# Patient Record
Sex: FEMALE | Race: BLACK OR AFRICAN AMERICAN | Employment: UNEMPLOYED | URBAN - METROPOLITAN AREA
[De-identification: names, ages, dates, MRNs, and addresses within clinical notes are randomized per-mention and may not be internally consistent; named-entity substitution may affect disease eponyms.]

---

## 2018-04-10 ENCOUNTER — HOSPITAL ENCOUNTER (OUTPATIENT)
Age: 58
Discharge: HOME | End: 2018-04-10
Payer: COMMERCIAL

## 2018-04-10 LAB
ALBUMIN SERPL-MCNC: 4.6 G/DL (ref 3.5–5.2)
ALP BLD-CCNC: 75 U/L (ref 35–104)
ALT SERPL-CCNC: 41 U/L (ref 0–32)
ANION GAP SERPL CALCULATED.3IONS-SCNC: 16 MMOL/L (ref 7–16)
AST SERPL-CCNC: 51 U/L (ref 0–31)
BILIRUB SERPL-MCNC: 0.4 MG/DL (ref 0–1.2)
BILIRUBIN URINE: NEGATIVE
BLOOD, URINE: NEGATIVE
BUN BLDV-MCNC: 8 MG/DL (ref 6–20)
CALCIUM SERPL-MCNC: 10.1 MG/DL (ref 8.6–10.2)
CHLORIDE BLD-SCNC: 98 MMOL/L (ref 98–107)
CHOLESTEROL, FASTING: 193 MG/DL (ref 0–199)
CLARITY: CLEAR
CO2: 27 MMOL/L (ref 22–29)
COLOR: YELLOW
CREAT SERPL-MCNC: 0.7 MG/DL (ref 0.5–1)
GFR AFRICAN AMERICAN: >60
GFR NON-AFRICAN AMERICAN: >60 ML/MIN/1.73
GLUCOSE FASTING: 81 MG/DL (ref 74–109)
GLUCOSE URINE: NEGATIVE MG/DL
HBA1C MFR BLD: 6.7 % (ref 4.8–5.9)
HCT VFR BLD CALC: 41.7 % (ref 34–48)
HDLC SERPL-MCNC: 41 MG/DL
HEMOGLOBIN: 13 G/DL (ref 11.5–15.5)
KETONES, URINE: NEGATIVE MG/DL
LDL CHOLESTEROL CALCULATED: 118 MG/DL (ref 0–99)
LEUKOCYTE ESTERASE, URINE: NEGATIVE
MCH RBC QN AUTO: 23.3 PG (ref 26–35)
MCHC RBC AUTO-ENTMCNC: 31.2 % (ref 32–34.5)
MCV RBC AUTO: 74.7 FL (ref 80–99.9)
NITRITE, URINE: NEGATIVE
PDW BLD-RTO: 14.9 FL (ref 11.5–15)
PH UA: 6 (ref 5–9)
PLATELET # BLD: 346 E9/L (ref 130–450)
PMV BLD AUTO: 9.7 FL (ref 7–12)
POTASSIUM SERPL-SCNC: 4.9 MMOL/L (ref 3.5–5)
PROTEIN UA: NEGATIVE MG/DL
RBC # BLD: 5.58 E12/L (ref 3.5–5.5)
SODIUM BLD-SCNC: 141 MMOL/L (ref 132–146)
SPECIFIC GRAVITY UA: 1.02 (ref 1–1.03)
TOTAL PROTEIN: 7.9 G/DL (ref 6.4–8.3)
TRIGLYCERIDE, FASTING: 171 MG/DL (ref 0–149)
UROBILINOGEN, URINE: 0.2 E.U./DL
VLDLC SERPL CALC-MCNC: 34 MG/DL
WBC # BLD: 6.1 E9/L (ref 4.5–11.5)

## 2018-04-10 PROCEDURE — 80053 COMPREHEN METABOLIC PANEL: CPT

## 2018-04-10 PROCEDURE — 85027 COMPLETE CBC AUTOMATED: CPT

## 2018-04-10 PROCEDURE — 80061 LIPID PANEL: CPT

## 2018-04-10 PROCEDURE — 83036 HEMOGLOBIN GLYCOSYLATED A1C: CPT

## 2018-04-10 PROCEDURE — 36415 COLL VENOUS BLD VENIPUNCTURE: CPT

## 2018-04-10 PROCEDURE — 81003 URINALYSIS AUTO W/O SCOPE: CPT

## 2018-10-25 ENCOUNTER — HOSPITAL ENCOUNTER (OUTPATIENT)
Dept: GENERAL RADIOLOGY | Age: 58
Discharge: HOME | End: 2018-10-27
Payer: COMMERCIAL

## 2018-10-25 DIAGNOSIS — Z12.31 VISIT FOR SCREENING MAMMOGRAM: ICD-10-CM

## 2018-10-25 PROCEDURE — 77067 SCR MAMMO BI INCL CAD: CPT

## 2018-11-02 ENCOUNTER — TELEPHONE (OUTPATIENT)
Dept: BARIATRICS/WEIGHT MGMT | Age: 58
End: 2018-11-02

## 2018-11-02 NOTE — TELEPHONE ENCOUNTER
Patient called in asking about a referral from Dr Sree Orellana for bariatric surgery, I checked her BMI and it is not at the level that is required for surgical intervention and she does not have any commodities that support it. Patient became upset and I did my best try to explain the insurance requirements.

## 2018-12-13 ENCOUNTER — HOSPITAL ENCOUNTER (EMERGENCY)
Age: 58
Discharge: HOME | End: 2018-12-14
Attending: EMERGENCY MEDICINE
Payer: COMMERCIAL

## 2018-12-13 ENCOUNTER — APPOINTMENT (OUTPATIENT)
Dept: CT IMAGING | Age: 58
End: 2018-12-13
Payer: COMMERCIAL

## 2018-12-13 VITALS
BODY MASS INDEX: 37.76 KG/M2 | SYSTOLIC BLOOD PRESSURE: 124 MMHG | DIASTOLIC BLOOD PRESSURE: 90 MMHG | WEIGHT: 200 LBS | RESPIRATION RATE: 14 BRPM | TEMPERATURE: 97.6 F | HEART RATE: 99 BPM | HEIGHT: 61 IN | OXYGEN SATURATION: 98 %

## 2018-12-13 DIAGNOSIS — R10.30 LOWER ABDOMINAL PAIN: Primary | ICD-10-CM

## 2018-12-13 LAB
ANION GAP SERPL CALCULATED.3IONS-SCNC: 11 MMOL/L (ref 7–16)
BACTERIA: ABNORMAL /HPF
BASOPHILS ABSOLUTE: 0.01 E9/L (ref 0–0.2)
BASOPHILS RELATIVE PERCENT: 0.1 % (ref 0–2)
BILIRUBIN URINE: NEGATIVE
BLOOD, URINE: NEGATIVE
BUN BLDV-MCNC: 11 MG/DL (ref 6–20)
CALCIUM SERPL-MCNC: 9.8 MG/DL (ref 8.6–10.2)
CASTS: ABNORMAL /LPF
CHLORIDE BLD-SCNC: 102 MMOL/L (ref 98–107)
CHP ED QC CHECK: YES
CLARITY: CLEAR
CO2: 25 MMOL/L (ref 22–29)
COLOR: YELLOW
CREAT SERPL-MCNC: 0.9 MG/DL (ref 0.5–1)
EOSINOPHILS ABSOLUTE: 0.11 E9/L (ref 0.05–0.5)
EOSINOPHILS RELATIVE PERCENT: 1.3 % (ref 0–6)
EPITHELIAL CELLS, UA: ABNORMAL /HPF
GFR AFRICAN AMERICAN: >60
GFR NON-AFRICAN AMERICAN: >60 ML/MIN/1.73
GLUCOSE BLD-MCNC: 119 MG/DL
GLUCOSE BLD-MCNC: 122 MG/DL (ref 74–99)
GLUCOSE URINE: NEGATIVE MG/DL
HCT VFR BLD CALC: 41.7 % (ref 34–48)
HEMOGLOBIN: 12.7 G/DL (ref 11.5–15.5)
IMMATURE GRANULOCYTES #: 0.02 E9/L
IMMATURE GRANULOCYTES %: 0.2 % (ref 0–5)
KETONES, URINE: ABNORMAL MG/DL
LEUKOCYTE ESTERASE, URINE: NEGATIVE
LIPASE: 31 U/L (ref 13–60)
LYMPHOCYTES ABSOLUTE: 0.95 E9/L (ref 1.5–4)
LYMPHOCYTES RELATIVE PERCENT: 11.4 % (ref 20–42)
MCH RBC QN AUTO: 23 PG (ref 26–35)
MCHC RBC AUTO-ENTMCNC: 30.5 % (ref 32–34.5)
MCV RBC AUTO: 75.5 FL (ref 80–99.9)
METER GLUCOSE: 119 MG/DL (ref 74–99)
MONOCYTES ABSOLUTE: 0.44 E9/L (ref 0.1–0.95)
MONOCYTES RELATIVE PERCENT: 5.3 % (ref 2–12)
NEUTROPHILS ABSOLUTE: 6.81 E9/L (ref 1.8–7.3)
NEUTROPHILS RELATIVE PERCENT: 81.7 % (ref 43–80)
NITRITE, URINE: NEGATIVE
PDW BLD-RTO: 14.9 FL (ref 11.5–15)
PH UA: 5.5 (ref 5–9)
PLATELET # BLD: 364 E9/L (ref 130–450)
PMV BLD AUTO: 9.4 FL (ref 7–12)
POTASSIUM REFLEX MAGNESIUM: 4.4 MMOL/L (ref 3.5–5)
PROTEIN UA: ABNORMAL MG/DL
RBC # BLD: 5.52 E12/L (ref 3.5–5.5)
RBC UA: ABNORMAL /HPF (ref 0–2)
SODIUM BLD-SCNC: 138 MMOL/L (ref 132–146)
SPECIFIC GRAVITY UA: >=1.03 (ref 1–1.03)
UROBILINOGEN, URINE: 0.2 E.U./DL
WBC # BLD: 8.3 E9/L (ref 4.5–11.5)
WBC UA: ABNORMAL /HPF (ref 0–5)

## 2018-12-13 PROCEDURE — 36415 COLL VENOUS BLD VENIPUNCTURE: CPT

## 2018-12-13 PROCEDURE — 81001 URINALYSIS AUTO W/SCOPE: CPT

## 2018-12-13 PROCEDURE — 99284 EMERGENCY DEPT VISIT MOD MDM: CPT

## 2018-12-13 PROCEDURE — 6360000004 HC RX CONTRAST MEDICATION: Performed by: RADIOLOGY

## 2018-12-13 PROCEDURE — 82962 GLUCOSE BLOOD TEST: CPT

## 2018-12-13 PROCEDURE — 85025 COMPLETE CBC W/AUTO DIFF WBC: CPT

## 2018-12-13 PROCEDURE — 74177 CT ABD & PELVIS W/CONTRAST: CPT

## 2018-12-13 PROCEDURE — 80048 BASIC METABOLIC PNL TOTAL CA: CPT

## 2018-12-13 PROCEDURE — 83690 ASSAY OF LIPASE: CPT

## 2018-12-13 RX ADMIN — IOPAMIDOL 110 ML: 755 INJECTION, SOLUTION INTRAVENOUS at 23:40

## 2018-12-13 ASSESSMENT — PAIN DESCRIPTION - PAIN TYPE: TYPE: ACUTE PAIN

## 2018-12-13 ASSESSMENT — PAIN DESCRIPTION - ORIENTATION: ORIENTATION: RIGHT;LEFT

## 2018-12-13 ASSESSMENT — PAIN SCALES - GENERAL: PAINLEVEL_OUTOF10: 6

## 2018-12-13 ASSESSMENT — PAIN DESCRIPTION - LOCATION: LOCATION: ABDOMEN

## 2018-12-13 ASSESSMENT — PAIN DESCRIPTION - DESCRIPTORS: DESCRIPTORS: STABBING

## 2018-12-14 RX ORDER — IBUPROFEN 600 MG/1
600 TABLET ORAL EVERY 6 HOURS PRN
Qty: 120 TABLET | Refills: 0 | Status: SHIPPED | OUTPATIENT
Start: 2018-12-14 | End: 2019-09-19

## 2018-12-14 NOTE — ED NOTES
FIRST PROVIDER CONTACT ASSESSMENT NOTE      Department of Emergency Medicine   12/13/18  8:05 PM    Chief Complaint: Abdominal Pain (for 2-3 days, diarrhea alternating with constipation, dizziness earlier with syncope, states felt over heated, recently dx with diabetes)      History of Present Illness:    Opal Perez is a 66 y.o. female who presents to the ED by private car for weakness with abd cramping gas, loose stools, was put on metformin 500 bid 2 weeks ago with a taper up for new onset NIDDM. NO fever chills N/V/ or Hx of gastric conditions. Has felt a little lightheaded off and on as well      Focused Screening Exam:  Constitutional:  Alert, appears stated age and is in no distress. *ALLERGIES*     Patient has no known allergies.      Vitals:    12/13/18 2006   BP: (!) 124/90   Pulse: 99   Resp: 14   Temp: 97.6 °F (36.4 °C)   SpO2: 98%   Weight: 200 lb (90.7 kg)   Height: 5' 1\" (1.549 m)         Initial Plan of Care:  Initiate Treatment-Testing, Proceed toTreatment Area When Bed Available for ED Attending/MLP to Continue Care    -----------------END OF FIRST PROVIDER CONTACT ASSESSMENT NOTE--------------  Electronically signed by MAXIMUS Ventura CNP   DD: 12/13/18       MAXIMUS Ventura CNP  12/13/18 2010

## 2018-12-14 NOTE — ED PROVIDER NOTES
Department of Emergency Medicine   ED  Provider Note  Admit Date/RoomTime: 12/13/2018  9:30 PM  ED Room: [facility]      History of Present Illness:  12/13/18, Time: 10:12 PM         Bianca Terrell is a 66 y.o. female presenting to the ED for abdominal pain, beginning one week ago. The complaint has been constant, moderate in severity, and worsened by nothing. Pt states that she has lower abdominal pain. Pt reports that she was just diagnosed with DM and that she thinks it could be the Metformin bothering her stomach. Pt denies cough, congestion, fever, chills, N/V/D, HA, CP, SOB, back pain, neck pain, LOC, syncope, urinary symptoms, constipation, or any other symptoms. Review of Systems:   Pertinent positives and negatives are stated within HPI, all other systems reviewed and are negative.    --------------------------------------------- PAST HISTORY ---------------------------------------------  Past Medical History:  has a past medical history of Hyperlipidemia and Hypertension. Past Surgical History:  has a past surgical history that includes Upper gastrointestinal endoscopy (01/09/2018) and Colonoscopy (01/09/2018). Social History:  reports that she has never smoked. She has never used smokeless tobacco. She reports that she does not drink alcohol or use drugs. Family History: family history includes Heart Attack in her maternal grandmother and paternal grandmother; Prostate Cancer in her paternal grandfather; Stroke in her maternal grandmother and paternal grandmother. The patients home medications have been reviewed. Allergies: Patient has no known allergies. ---------------------------------------------------PHYSICAL EXAM--------------------------------------    Constitutional/General: Alert and oriented x3, well appearing, non toxic in NAD  Head: Normocephalic and atraumatic  Eyes: PERRL, EOMI. Mouth: Oropharynx clear, mucous membranes moist.   Neck: Supple. Full ROM. Respiratory: Lungs clear to auscultation bilaterally, no wheezes, rales, or rhonchi. Not in respiratory distress   Cardiovascular:  Regular rate. Regular rhythm. No murmurs, gallops, or rubs. 2+ distal pulses  GI:  Abdomen Soft, Diffuse lower abdominal pain, slight guarding, Non distended. No rebound or rigidity. Musculoskeletal: Moves all extremities x 4. Warm and well perfused. Integument: skin warm and dry. No rashes. Neurologic: GCS 15, 5/5 strength.   -------------------------------------------------- RESULTS -------------------------------------------------  I have personally reviewed all laboratory and imaging results for this patient. Results are listed below.      LABS:  Results for orders placed or performed during the hospital encounter of 12/13/18   CBC Auto Differential   Result Value Ref Range    WBC 8.3 4.5 - 11.5 E9/L    RBC 5.52 (H) 3.50 - 5.50 E12/L    Hemoglobin 12.7 11.5 - 15.5 g/dL    Hematocrit 41.7 34.0 - 48.0 %    MCV 75.5 (L) 80.0 - 99.9 fL    MCH 23.0 (L) 26.0 - 35.0 pg    MCHC 30.5 (L) 32.0 - 34.5 %    RDW 14.9 11.5 - 15.0 fL    Platelets 136 589 - 729 E9/L    MPV 9.4 7.0 - 12.0 fL    Neutrophils % 81.7 (H) 43.0 - 80.0 %    Immature Granulocytes % 0.2 0.0 - 5.0 %    Lymphocytes % 11.4 (L) 20.0 - 42.0 %    Monocytes % 5.3 2.0 - 12.0 %    Eosinophils % 1.3 0.0 - 6.0 %    Basophils % 0.1 0.0 - 2.0 %    Neutrophils # 6.81 1.80 - 7.30 E9/L    Immature Granulocytes # 0.02 E9/L    Lymphocytes # 0.95 (L) 1.50 - 4.00 E9/L    Monocytes # 0.44 0.10 - 0.95 E9/L    Eosinophils # 0.11 0.05 - 0.50 E9/L    Basophils # 0.01 0.00 - 0.20 Y8/C   Basic Metabolic Panel w/ Reflex to MG   Result Value Ref Range    Sodium 138 132 - 146 mmol/L    Potassium reflex Magnesium 4.4 3.5 - 5.0 mmol/L    Chloride 102 98 - 107 mmol/L    CO2 25 22 - 29 mmol/L    Anion Gap 11 7 - 16 mmol/L    Glucose 122 (H) 74 - 99 mg/dL    BUN 11 6 - 20 mg/dL    CREATININE 0.9 0.5 - 1.0 mg/dL    GFR Non-African American >60 >=60

## 2019-08-05 ENCOUNTER — TELEPHONE (OUTPATIENT)
Dept: ADMINISTRATIVE | Age: 59
End: 2019-08-05

## 2019-09-19 ENCOUNTER — APPOINTMENT (OUTPATIENT)
Dept: CT IMAGING | Age: 59
End: 2019-09-19
Payer: COMMERCIAL

## 2019-09-19 ENCOUNTER — HOSPITAL ENCOUNTER (OUTPATIENT)
Age: 59
Setting detail: OBSERVATION
Discharge: LEFT BEFORE DISCHARGE | End: 2019-09-20
Attending: EMERGENCY MEDICINE | Admitting: HOSPITALIST
Payer: COMMERCIAL

## 2019-09-19 ENCOUNTER — APPOINTMENT (OUTPATIENT)
Dept: GENERAL RADIOLOGY | Age: 59
End: 2019-09-19
Payer: COMMERCIAL

## 2019-09-19 ENCOUNTER — OFFICE VISIT (OUTPATIENT)
Dept: CARDIOLOGY CLINIC | Age: 59
End: 2019-09-19
Payer: COMMERCIAL

## 2019-09-19 VITALS
OXYGEN SATURATION: 96 % | RESPIRATION RATE: 18 BRPM | WEIGHT: 224.4 LBS | HEIGHT: 61 IN | BODY MASS INDEX: 42.37 KG/M2 | SYSTOLIC BLOOD PRESSURE: 90 MMHG | DIASTOLIC BLOOD PRESSURE: 50 MMHG | HEART RATE: 95 BPM

## 2019-09-19 DIAGNOSIS — R53.83 FATIGUE, UNSPECIFIED TYPE: ICD-10-CM

## 2019-09-19 DIAGNOSIS — E78.5 HYPERLIPIDEMIA, UNSPECIFIED HYPERLIPIDEMIA TYPE: ICD-10-CM

## 2019-09-19 DIAGNOSIS — E66.01 CLASS 3 SEVERE OBESITY DUE TO EXCESS CALORIES WITH BODY MASS INDEX (BMI) OF 40.0 TO 44.9 IN ADULT, UNSPECIFIED WHETHER SERIOUS COMORBIDITY PRESENT (HCC): ICD-10-CM

## 2019-09-19 DIAGNOSIS — R07.9 CHEST PAIN, UNSPECIFIED TYPE: Primary | ICD-10-CM

## 2019-09-19 DIAGNOSIS — E11.9 TYPE 2 DIABETES MELLITUS WITHOUT COMPLICATION, WITHOUT LONG-TERM CURRENT USE OF INSULIN (HCC): ICD-10-CM

## 2019-09-19 DIAGNOSIS — I10 ESSENTIAL HYPERTENSION: ICD-10-CM

## 2019-09-19 LAB
ALBUMIN SERPL-MCNC: 4.5 G/DL (ref 3.5–5.2)
ALP BLD-CCNC: 78 U/L (ref 35–104)
ALT SERPL-CCNC: 37 U/L (ref 0–32)
ANION GAP SERPL CALCULATED.3IONS-SCNC: 15 MMOL/L (ref 7–16)
AST SERPL-CCNC: 44 U/L (ref 0–31)
BACTERIA: ABNORMAL /HPF
BILIRUB SERPL-MCNC: 0.3 MG/DL (ref 0–1.2)
BILIRUBIN URINE: ABNORMAL
BLOOD, URINE: NEGATIVE
BUN BLDV-MCNC: 18 MG/DL (ref 6–20)
C-REACTIVE PROTEIN: 0.5 MG/DL (ref 0–0.4)
CALCIUM SERPL-MCNC: 10.1 MG/DL (ref 8.6–10.2)
CHLORIDE BLD-SCNC: 100 MMOL/L (ref 98–107)
CLARITY: CLEAR
CO2: 26 MMOL/L (ref 22–29)
COLOR: YELLOW
CREAT SERPL-MCNC: 0.9 MG/DL (ref 0.5–1)
EPITHELIAL CELLS, UA: ABNORMAL /HPF
GFR AFRICAN AMERICAN: >60
GFR NON-AFRICAN AMERICAN: >60 ML/MIN/1.73
GLUCOSE BLD-MCNC: 98 MG/DL (ref 74–99)
GLUCOSE URINE: NEGATIVE MG/DL
HBA1C MFR BLD: 6.2 % (ref 4–5.6)
HCT VFR BLD CALC: 43.2 % (ref 34–48)
HEMOGLOBIN: 13.7 G/DL (ref 11.5–15.5)
KETONES, URINE: 15 MG/DL
LEUKOCYTE ESTERASE, URINE: ABNORMAL
MCH RBC QN AUTO: 24 PG (ref 26–35)
MCHC RBC AUTO-ENTMCNC: 31.7 % (ref 32–34.5)
MCV RBC AUTO: 75.5 FL (ref 80–99.9)
METER GLUCOSE: 96 MG/DL (ref 74–99)
MUCUS: PRESENT
NITRITE, URINE: NEGATIVE
PDW BLD-RTO: 14.8 FL (ref 11.5–15)
PH UA: 5.5 (ref 5–9)
PLATELET # BLD: 424 E9/L (ref 130–450)
PMV BLD AUTO: 9.5 FL (ref 7–12)
POTASSIUM SERPL-SCNC: 4.3 MMOL/L (ref 3.5–5)
PRO-BNP: 12 PG/ML (ref 0–125)
PROTEIN UA: ABNORMAL MG/DL
RBC # BLD: 5.72 E12/L (ref 3.5–5.5)
RBC UA: ABNORMAL /HPF (ref 0–2)
SEDIMENTATION RATE, ERYTHROCYTE: 34 MM/HR (ref 0–20)
SODIUM BLD-SCNC: 141 MMOL/L (ref 132–146)
SPECIFIC GRAVITY UA: >=1.03 (ref 1–1.03)
TOTAL PROTEIN: 8.8 G/DL (ref 6.4–8.3)
TROPONIN: <0.01 NG/ML (ref 0–0.03)
TROPONIN: <0.01 NG/ML (ref 0–0.03)
TSH SERPL DL<=0.05 MIU/L-ACNC: 1.46 UIU/ML (ref 0.27–4.2)
UROBILINOGEN, URINE: 0.2 E.U./DL
WBC # BLD: 6.8 E9/L (ref 4.5–11.5)
WBC UA: ABNORMAL /HPF (ref 0–5)

## 2019-09-19 PROCEDURE — G0378 HOSPITAL OBSERVATION PER HR: HCPCS

## 2019-09-19 PROCEDURE — 86140 C-REACTIVE PROTEIN: CPT

## 2019-09-19 PROCEDURE — 6370000000 HC RX 637 (ALT 250 FOR IP): Performed by: EMERGENCY MEDICINE

## 2019-09-19 PROCEDURE — 85651 RBC SED RATE NONAUTOMATED: CPT

## 2019-09-19 PROCEDURE — 84484 ASSAY OF TROPONIN QUANT: CPT

## 2019-09-19 PROCEDURE — 80053 COMPREHEN METABOLIC PANEL: CPT

## 2019-09-19 PROCEDURE — 99285 EMERGENCY DEPT VISIT HI MDM: CPT

## 2019-09-19 PROCEDURE — 86703 HIV-1/HIV-2 1 RESULT ANTBDY: CPT

## 2019-09-19 PROCEDURE — 83036 HEMOGLOBIN GLYCOSYLATED A1C: CPT

## 2019-09-19 PROCEDURE — 99204 OFFICE O/P NEW MOD 45 MIN: CPT | Performed by: INTERNAL MEDICINE

## 2019-09-19 PROCEDURE — 87633 RESP VIRUS 12-25 TARGETS: CPT

## 2019-09-19 PROCEDURE — 71250 CT THORAX DX C-: CPT

## 2019-09-19 PROCEDURE — 94761 N-INVAS EAR/PLS OXIMETRY MLT: CPT

## 2019-09-19 PROCEDURE — 36415 COLL VENOUS BLD VENIPUNCTURE: CPT

## 2019-09-19 PROCEDURE — 84443 ASSAY THYROID STIM HORMONE: CPT

## 2019-09-19 PROCEDURE — 82962 GLUCOSE BLOOD TEST: CPT

## 2019-09-19 PROCEDURE — 87486 CHLMYD PNEUM DNA AMP PROBE: CPT

## 2019-09-19 PROCEDURE — 93000 ELECTROCARDIOGRAM COMPLETE: CPT | Performed by: INTERNAL MEDICINE

## 2019-09-19 PROCEDURE — 85027 COMPLETE CBC AUTOMATED: CPT

## 2019-09-19 PROCEDURE — 87581 M.PNEUMON DNA AMP PROBE: CPT

## 2019-09-19 PROCEDURE — 93005 ELECTROCARDIOGRAM TRACING: CPT | Performed by: NURSE PRACTITIONER

## 2019-09-19 PROCEDURE — 83880 ASSAY OF NATRIURETIC PEPTIDE: CPT

## 2019-09-19 PROCEDURE — 2580000003 HC RX 258: Performed by: EMERGENCY MEDICINE

## 2019-09-19 PROCEDURE — 81001 URINALYSIS AUTO W/SCOPE: CPT

## 2019-09-19 PROCEDURE — 80074 ACUTE HEPATITIS PANEL: CPT

## 2019-09-19 PROCEDURE — 87798 DETECT AGENT NOS DNA AMP: CPT

## 2019-09-19 PROCEDURE — 2580000003 HC RX 258: Performed by: HOSPITALIST

## 2019-09-19 PROCEDURE — 71045 X-RAY EXAM CHEST 1 VIEW: CPT

## 2019-09-19 RX ORDER — 0.9 % SODIUM CHLORIDE 0.9 %
1000 INTRAVENOUS SOLUTION INTRAVENOUS ONCE
Status: COMPLETED | OUTPATIENT
Start: 2019-09-19 | End: 2019-09-19

## 2019-09-19 RX ORDER — SODIUM CHLORIDE 0.9 % (FLUSH) 0.9 %
10 SYRINGE (ML) INJECTION EVERY 12 HOURS SCHEDULED
Status: DISCONTINUED | OUTPATIENT
Start: 2019-09-19 | End: 2019-09-20 | Stop reason: HOSPADM

## 2019-09-19 RX ORDER — GLIPIZIDE 5 MG/1
5 TABLET ORAL DAILY
COMMUNITY
End: 2019-09-19

## 2019-09-19 RX ORDER — SODIUM CHLORIDE 0.9 % (FLUSH) 0.9 %
10 SYRINGE (ML) INJECTION PRN
Status: DISCONTINUED | OUTPATIENT
Start: 2019-09-19 | End: 2019-09-20 | Stop reason: HOSPADM

## 2019-09-19 RX ORDER — ASPIRIN 81 MG/1
324 TABLET, CHEWABLE ORAL ONCE
Status: COMPLETED | OUTPATIENT
Start: 2019-09-19 | End: 2019-09-19

## 2019-09-19 RX ORDER — ONDANSETRON 2 MG/ML
4 INJECTION INTRAMUSCULAR; INTRAVENOUS EVERY 6 HOURS PRN
Status: DISCONTINUED | OUTPATIENT
Start: 2019-09-19 | End: 2019-09-20 | Stop reason: HOSPADM

## 2019-09-19 RX ORDER — PRAMIPEXOLE DIHYDROCHLORIDE 0.12 MG/1
0.12 TABLET ORAL NIGHTLY PRN
COMMUNITY
End: 2020-08-23 | Stop reason: ALTCHOICE

## 2019-09-19 RX ORDER — PANTOPRAZOLE SODIUM 40 MG/1
40 TABLET, DELAYED RELEASE ORAL
Status: DISCONTINUED | OUTPATIENT
Start: 2019-09-19 | End: 2019-09-20 | Stop reason: HOSPADM

## 2019-09-19 RX ADMIN — SODIUM CHLORIDE 1000 ML: 9 INJECTION, SOLUTION INTRAVENOUS at 16:40

## 2019-09-19 RX ADMIN — Medication 10 ML: at 21:08

## 2019-09-19 RX ADMIN — ASPIRIN 81 MG 324 MG: 81 TABLET ORAL at 15:30

## 2019-09-19 ASSESSMENT — ENCOUNTER SYMPTOMS
DIARRHEA: 0
BLOOD IN STOOL: 0
WHEEZING: 0
COUGH: 0
CONSTIPATION: 1
BACK PAIN: 0
ABDOMINAL PAIN: 1
NAUSEA: 0
VOMITING: 0
SHORTNESS OF BREATH: 0

## 2019-09-19 ASSESSMENT — PAIN SCALES - GENERAL
PAINLEVEL_OUTOF10: 0
PAINLEVEL_OUTOF10: 3

## 2019-09-19 ASSESSMENT — PAIN DESCRIPTION - DIRECTION: RADIATING_TOWARDS: LEFT SHOULDER

## 2019-09-19 ASSESSMENT — PAIN DESCRIPTION - DESCRIPTORS: DESCRIPTORS: HEAVINESS

## 2019-09-19 ASSESSMENT — PAIN DESCRIPTION - PAIN TYPE: TYPE: ACUTE PAIN

## 2019-09-19 ASSESSMENT — PAIN DESCRIPTION - LOCATION: LOCATION: CHEST

## 2019-09-19 ASSESSMENT — PAIN DESCRIPTION - ORIENTATION: ORIENTATION: MID

## 2019-09-19 NOTE — PROGRESS NOTES
Emotionally abused: Not on file     Physically abused: Not on file     Forced sexual activity: Not on file   Other Topics Concern    Not on file   Social History Narrative    Cut out pop since bariatric sx on 9/10/19; no coffee/tea       Allergies:  No Known Allergies    Current Medications:  Current Outpatient Medications   Medication Sig Dispense Refill    glipiZIDE (GLUCOTROL) 5 MG tablet Take 5 mg by mouth daily      ibuprofen (IBU) 600 MG tablet Take 1 tablet by mouth every 6 hours as needed for Pain (Patient not taking: Reported on 9/19/2019) 120 tablet 0    omeprazole (PRILOSEC) 20 MG delayed release capsule Take 1 capsule by mouth 2 times daily (Patient not taking: Reported on 9/19/2019) 60 capsule 3    ALPRAZolam (XANAX) 0.25 MG tablet TK 1 T PO PRN  1    acetaminophen (TYLENOL) 325 MG tablet Take 650 mg by mouth every 6 hours as needed for Pain      pravastatin (PRAVACHOL) 10 MG tablet Take 20 mg by mouth nightly       amLODIPine (NORVASC) 5 MG tablet Take 5 mg by mouth daily       No current facility-administered medications for this visit. Physical Exam:  BP (!) 90/50   Pulse 95   Resp 18   Ht 5' 1\" (1.549 m)   Wt 224 lb 6.4 oz (101.8 kg)   LMP 01/10/2013   SpO2 96%   BMI 42.40 kg/m²   Wt Readings from Last 3 Encounters:   09/19/19 224 lb 6.4 oz (101.8 kg)   12/13/18 200 lb (90.7 kg)   12/14/17 200 lb (90.7 kg)     Physical Exam:  BP (!) 90/50   Pulse 95   Resp 18   Ht 5' 1\" (1.549 m)   Wt 224 lb 6.4 oz (101.8 kg)   LMP 01/10/2013   SpO2 96%   BMI 42.40 kg/m²   Wt Readings from Last 3 Encounters:   09/19/19 224 lb 6.4 oz (101.8 kg)   12/13/18 200 lb (90.7 kg)   12/14/17 200 lb (90.7 kg)     Physical Exam   Constitutional: She is oriented to person, place, and time. She appears well-developed. No distress. HENT:   Head: Normocephalic and atraumatic. Neck: Neck supple. No JVD present.    Cardiovascular: Normal rate, regular rhythm, normal heart sounds and intact distal LABVLDL 24 04/27/2017       Cardiac Tests:    ECG: Sinus rhythm at 95 bpm, left atrial enlargement and low voltage in the frontal leads. Edith Night done on 9/30/2017:  Normal examination without evidence of treadmill stress induced left   ventricular myocardial ischemia. EF 55%.           ASSESSMENT / PLANt:    Chest pain: of unclear etiology. It could be due to underlying CAD since she has multiple risk factors. She had a nuclear tress test at [facility] on 3/7/2019 that was reported as normal.  However, her pain could be due to GERD or musculoskeletal in origin. .     Type 2 diabetes mellitus without complication, without long-term current use of insulin ([facility] [address]    Essential hypertension: Patient has hypotension at the office on no meds. She is probably dehydrated since not eating well after her gastric bypass surgery. Hyperlipidemia, unspecified hyperlipidemia type    Fatigue after her gastric bypass surgery: The fatigue could be due to hypotension. Class 3 severe obesity due to excess calories with body mass index (BMI) of 40.0 to 44.9 in adult, unspecified whether serious comorbidity present [facility])    Will refer to the emergency department for hydration due to low blood pressure and for blood tests to rule out dehydration, bleeding, or PE or myocardial infarction. Thank you for allowing me to participate in your patient's care. Please feel free to contact me if you have any questions or concerns.     Mihai Gandara MD, [facility], [address] [place] [address] Cardiology

## 2019-09-20 ENCOUNTER — APPOINTMENT (OUTPATIENT)
Dept: NON INVASIVE DIAGNOSTICS | Age: 59
End: 2019-09-20
Payer: COMMERCIAL

## 2019-09-20 VITALS
SYSTOLIC BLOOD PRESSURE: 125 MMHG | OXYGEN SATURATION: 95 % | BODY MASS INDEX: 42.48 KG/M2 | TEMPERATURE: 98.1 F | DIASTOLIC BLOOD PRESSURE: 73 MMHG | RESPIRATION RATE: 16 BRPM | HEIGHT: 61 IN | WEIGHT: 225 LBS | HEART RATE: 86 BPM

## 2019-09-20 LAB
ANION GAP SERPL CALCULATED.3IONS-SCNC: 18 MMOL/L (ref 7–16)
BUN BLDV-MCNC: 14 MG/DL (ref 6–20)
CALCIUM SERPL-MCNC: 8.8 MG/DL (ref 8.6–10.2)
CHLORIDE BLD-SCNC: 104 MMOL/L (ref 98–107)
CO2: 19 MMOL/L (ref 22–29)
CREAT SERPL-MCNC: 0.7 MG/DL (ref 0.5–1)
EKG ATRIAL RATE: 76 BPM
EKG P AXIS: 68 DEGREES
EKG P-R INTERVAL: 144 MS
EKG Q-T INTERVAL: 386 MS
EKG QRS DURATION: 64 MS
EKG QTC CALCULATION (BAZETT): 434 MS
EKG R AXIS: 35 DEGREES
EKG T AXIS: 36 DEGREES
EKG VENTRICULAR RATE: 76 BPM
FILM ARRAY ADENOVIRUS: NORMAL
FILM ARRAY BORDETELLA PERTUSSIS: NORMAL
FILM ARRAY CHLAMYDOPHILIA PNEUMONIAE: NORMAL
FILM ARRAY CORONAVIRUS 229E: NORMAL
FILM ARRAY CORONAVIRUS HKU1: NORMAL
FILM ARRAY CORONAVIRUS NL63: NORMAL
FILM ARRAY CORONAVIRUS OC43: NORMAL
FILM ARRAY INFLUENZA A VIRUS 09H1: NORMAL
FILM ARRAY INFLUENZA A VIRUS H1: NORMAL
FILM ARRAY INFLUENZA A VIRUS H3: NORMAL
FILM ARRAY INFLUENZA A VIRUS: NORMAL
FILM ARRAY INFLUENZA B: NORMAL
FILM ARRAY METAPNEUMOVIRUS: NORMAL
FILM ARRAY MYCOPLASMA PNEUMONIAE: NORMAL
FILM ARRAY PARAINFLUENZA VIRUS 1: NORMAL
FILM ARRAY PARAINFLUENZA VIRUS 2: NORMAL
FILM ARRAY PARAINFLUENZA VIRUS 3: NORMAL
FILM ARRAY PARAINFLUENZA VIRUS 4: NORMAL
FILM ARRAY RESPIRATORY SYNCITIAL VIRUS: NORMAL
FILM ARRAY RHINOVIRUS/ENTEROVIRUS: NORMAL
GFR AFRICAN AMERICAN: >60
GFR NON-AFRICAN AMERICAN: >60 ML/MIN/1.73
GLUCOSE BLD-MCNC: 86 MG/DL (ref 74–99)
HAV IGM SER IA-ACNC: NORMAL
HCT VFR BLD CALC: 38.2 % (ref 34–48)
HEMOGLOBIN: 12 G/DL (ref 11.5–15.5)
HEPATITIS B CORE IGM ANTIBODY: NORMAL
HEPATITIS B SURFACE ANTIGEN INTERPRETATION: NORMAL
HEPATITIS C ANTIBODY INTERPRETATION: NORMAL
HIV-1 AND HIV-2 ANTIBODIES: NORMAL
MAGNESIUM: 2 MG/DL (ref 1.6–2.6)
MCH RBC QN AUTO: 23.7 PG (ref 26–35)
MCHC RBC AUTO-ENTMCNC: 31.4 % (ref 32–34.5)
MCV RBC AUTO: 75.3 FL (ref 80–99.9)
METER GLUCOSE: 88 MG/DL (ref 74–99)
PDW BLD-RTO: 14.6 FL (ref 11.5–15)
PHOSPHORUS: 3.2 MG/DL (ref 2.5–4.5)
PLATELET # BLD: 367 E9/L (ref 130–450)
PMV BLD AUTO: 9.7 FL (ref 7–12)
POTASSIUM SERPL-SCNC: 3.8 MMOL/L (ref 3.5–5)
RBC # BLD: 5.07 E12/L (ref 3.5–5.5)
SODIUM BLD-SCNC: 141 MMOL/L (ref 132–146)
TROPONIN: <0.01 NG/ML (ref 0–0.03)
WBC # BLD: 5.3 E9/L (ref 4.5–11.5)

## 2019-09-20 PROCEDURE — 84484 ASSAY OF TROPONIN QUANT: CPT

## 2019-09-20 PROCEDURE — 99215 OFFICE O/P EST HI 40 MIN: CPT | Performed by: INTERNAL MEDICINE

## 2019-09-20 PROCEDURE — 93010 ELECTROCARDIOGRAM REPORT: CPT | Performed by: INTERNAL MEDICINE

## 2019-09-20 PROCEDURE — 80048 BASIC METABOLIC PNL TOTAL CA: CPT

## 2019-09-20 PROCEDURE — 36415 COLL VENOUS BLD VENIPUNCTURE: CPT

## 2019-09-20 PROCEDURE — 84100 ASSAY OF PHOSPHORUS: CPT

## 2019-09-20 PROCEDURE — G0378 HOSPITAL OBSERVATION PER HR: HCPCS

## 2019-09-20 PROCEDURE — 6370000000 HC RX 637 (ALT 250 FOR IP): Performed by: HOSPITALIST

## 2019-09-20 PROCEDURE — 82962 GLUCOSE BLOOD TEST: CPT

## 2019-09-20 PROCEDURE — 83735 ASSAY OF MAGNESIUM: CPT

## 2019-09-20 PROCEDURE — 85027 COMPLETE CBC AUTOMATED: CPT

## 2019-09-20 RX ADMIN — PANTOPRAZOLE SODIUM 40 MG: 40 TABLET, DELAYED RELEASE ORAL at 06:06

## 2019-09-20 ASSESSMENT — PAIN SCALES - GENERAL: PAINLEVEL_OUTOF10: 0

## 2019-09-20 NOTE — PROGRESS NOTES
Blood pressure has improved.  Continue to monitor blood pressure correctly at home and goal is less than 120/80.  Will increase amlodipine to 5 mg if indicated.  Discussed watching dietary sodium as well.   Page sent out to Dr. Galilea Liriano regarding new cardiology consult. Awaiting call back. Dr. Galilea Liriano made phone call to unit. See new orders.   [address]

## 2019-09-20 NOTE — PROGRESS NOTES
Called Echo regarding patient needing test completed for discharge at 1500. RN was informed that time will be adjusted if possible.     Kenya Godinez RN

## 2019-09-20 NOTE — CONSULTS
Patient is known to [address] [place] [address] cardiology, Will deferConsult to their service

## 2019-11-01 ENCOUNTER — OFFICE VISIT (OUTPATIENT)
Dept: CARDIOLOGY CLINIC | Age: 59
End: 2019-11-01
Payer: COMMERCIAL

## 2019-11-01 VITALS
WEIGHT: 210 LBS | RESPIRATION RATE: 16 BRPM | DIASTOLIC BLOOD PRESSURE: 82 MMHG | BODY MASS INDEX: 39.65 KG/M2 | SYSTOLIC BLOOD PRESSURE: 128 MMHG | HEIGHT: 61 IN | HEART RATE: 73 BPM

## 2019-11-01 DIAGNOSIS — R07.9 CHEST PAIN, UNSPECIFIED TYPE: Primary | ICD-10-CM

## 2019-11-01 PROCEDURE — 99213 OFFICE O/P EST LOW 20 MIN: CPT | Performed by: INTERNAL MEDICINE

## 2019-11-01 PROCEDURE — 93000 ELECTROCARDIOGRAM COMPLETE: CPT | Performed by: INTERNAL MEDICINE

## 2019-11-01 ASSESSMENT — ENCOUNTER SYMPTOMS
DIARRHEA: 0
WHEEZING: 0
SHORTNESS OF BREATH: 0
ABDOMINAL PAIN: 0
NAUSEA: 0
BLOOD IN STOOL: 0
COUGH: 0
CONSTIPATION: 1
BACK PAIN: 0
VOMITING: 0

## 2019-12-19 ENCOUNTER — HOSPITAL ENCOUNTER (OUTPATIENT)
Dept: GENERAL RADIOLOGY | Age: 59
Discharge: HOME | End: 2019-12-21
Payer: COMMERCIAL

## 2019-12-19 DIAGNOSIS — Z12.31 VISIT FOR SCREENING MAMMOGRAM: ICD-10-CM

## 2019-12-19 PROCEDURE — 77063 BREAST TOMOSYNTHESIS BI: CPT

## 2020-03-13 ENCOUNTER — HOSPITAL ENCOUNTER (OUTPATIENT)
Age: 60
Discharge: HOME | End: 2020-03-13
Payer: COMMERCIAL

## 2020-03-13 LAB
ALBUMIN SERPL-MCNC: 4.3 G/DL (ref 3.5–5.2)
ALP BLD-CCNC: 92 U/L (ref 35–104)
ALT SERPL-CCNC: 18 U/L (ref 0–32)
ANION GAP SERPL CALCULATED.3IONS-SCNC: 11 MMOL/L (ref 7–16)
AST SERPL-CCNC: 26 U/L (ref 0–31)
BASOPHILS ABSOLUTE: 0.03 E9/L (ref 0–0.2)
BASOPHILS RELATIVE PERCENT: 0.7 % (ref 0–2)
BILIRUB SERPL-MCNC: 0.3 MG/DL (ref 0–1.2)
BUN BLDV-MCNC: 11 MG/DL (ref 6–20)
CALCIUM SERPL-MCNC: 9.5 MG/DL (ref 8.6–10.2)
CHLORIDE BLD-SCNC: 103 MMOL/L (ref 98–107)
CO2: 26 MMOL/L (ref 22–29)
CREAT SERPL-MCNC: 0.8 MG/DL (ref 0.5–1)
EOSINOPHILS ABSOLUTE: 0.12 E9/L (ref 0.05–0.5)
EOSINOPHILS RELATIVE PERCENT: 2.9 % (ref 0–6)
GFR AFRICAN AMERICAN: >60
GFR NON-AFRICAN AMERICAN: >60 ML/MIN/1.73
GLUCOSE BLD-MCNC: 98 MG/DL (ref 74–99)
HCT VFR BLD CALC: 41.4 % (ref 34–48)
HEMOGLOBIN: 12.9 G/DL (ref 11.5–15.5)
IMMATURE GRANULOCYTES #: 0 E9/L
IMMATURE GRANULOCYTES %: 0 % (ref 0–5)
LYMPHOCYTES ABSOLUTE: 2.2 E9/L (ref 1.5–4)
LYMPHOCYTES RELATIVE PERCENT: 52.5 % (ref 20–42)
MCH RBC QN AUTO: 23.8 PG (ref 26–35)
MCHC RBC AUTO-ENTMCNC: 31.2 % (ref 32–34.5)
MCV RBC AUTO: 76.2 FL (ref 80–99.9)
MONOCYTES ABSOLUTE: 0.31 E9/L (ref 0.1–0.95)
MONOCYTES RELATIVE PERCENT: 7.4 % (ref 2–12)
NEUTROPHILS ABSOLUTE: 1.53 E9/L (ref 1.8–7.3)
NEUTROPHILS RELATIVE PERCENT: 36.5 % (ref 43–80)
PDW BLD-RTO: 14.9 FL (ref 11.5–15)
PLATELET # BLD: 321 E9/L (ref 130–450)
PMV BLD AUTO: 9.3 FL (ref 7–12)
POTASSIUM SERPL-SCNC: 4.1 MMOL/L (ref 3.5–5)
RBC # BLD: 5.43 E12/L (ref 3.5–5.5)
SODIUM BLD-SCNC: 140 MMOL/L (ref 132–146)
TOTAL PROTEIN: 7.6 G/DL (ref 6.4–8.3)
WBC # BLD: 4.2 E9/L (ref 4.5–11.5)

## 2020-03-13 PROCEDURE — 36415 COLL VENOUS BLD VENIPUNCTURE: CPT

## 2020-03-13 PROCEDURE — 85025 COMPLETE CBC W/AUTO DIFF WBC: CPT

## 2020-03-13 PROCEDURE — 80053 COMPREHEN METABOLIC PANEL: CPT

## 2020-05-10 ENCOUNTER — APPOINTMENT (OUTPATIENT)
Dept: GENERAL RADIOLOGY | Age: 60
End: 2020-05-10
Payer: COMMERCIAL

## 2020-05-10 ENCOUNTER — HOSPITAL ENCOUNTER (EMERGENCY)
Age: 60
Discharge: HOME | End: 2020-05-10
Attending: EMERGENCY MEDICINE
Payer: COMMERCIAL

## 2020-05-10 ENCOUNTER — APPOINTMENT (OUTPATIENT)
Dept: CT IMAGING | Age: 60
End: 2020-05-10
Payer: COMMERCIAL

## 2020-05-10 VITALS
HEIGHT: 62 IN | BODY MASS INDEX: 32.2 KG/M2 | TEMPERATURE: 98.8 F | HEART RATE: 85 BPM | WEIGHT: 175 LBS | RESPIRATION RATE: 16 BRPM | OXYGEN SATURATION: 99 % | DIASTOLIC BLOOD PRESSURE: 70 MMHG | SYSTOLIC BLOOD PRESSURE: 130 MMHG

## 2020-05-10 LAB
ANION GAP SERPL CALCULATED.3IONS-SCNC: 10 MMOL/L (ref 7–16)
BASOPHILS ABSOLUTE: 0.02 E9/L (ref 0–0.2)
BASOPHILS RELATIVE PERCENT: 0.5 % (ref 0–2)
BUN BLDV-MCNC: 8 MG/DL (ref 6–20)
CALCIUM SERPL-MCNC: 9.1 MG/DL (ref 8.6–10.2)
CHLORIDE BLD-SCNC: 102 MMOL/L (ref 98–107)
CO2: 26 MMOL/L (ref 22–29)
CREAT SERPL-MCNC: 0.9 MG/DL (ref 0.5–1)
D DIMER: 438 NG/ML DDU
EOSINOPHILS ABSOLUTE: 0.01 E9/L (ref 0.05–0.5)
EOSINOPHILS RELATIVE PERCENT: 0.2 % (ref 0–6)
GFR AFRICAN AMERICAN: >60
GFR NON-AFRICAN AMERICAN: >60 ML/MIN/1.73
GLUCOSE BLD-MCNC: 73 MG/DL (ref 74–99)
HCT VFR BLD CALC: 38.7 % (ref 34–48)
HEMOGLOBIN: 12 G/DL (ref 11.5–15.5)
IMMATURE GRANULOCYTES #: 0.01 E9/L
IMMATURE GRANULOCYTES %: 0.2 % (ref 0–5)
LYMPHOCYTES ABSOLUTE: 0.9 E9/L (ref 1.5–4)
LYMPHOCYTES RELATIVE PERCENT: 21 % (ref 20–42)
MCH RBC QN AUTO: 23.6 PG (ref 26–35)
MCHC RBC AUTO-ENTMCNC: 31 % (ref 32–34.5)
MCV RBC AUTO: 76.2 FL (ref 80–99.9)
MONOCYTES ABSOLUTE: 0.63 E9/L (ref 0.1–0.95)
MONOCYTES RELATIVE PERCENT: 14.7 % (ref 2–12)
NEUTROPHILS ABSOLUTE: 2.71 E9/L (ref 1.8–7.3)
NEUTROPHILS RELATIVE PERCENT: 63.4 % (ref 43–80)
PDW BLD-RTO: 14.6 FL (ref 11.5–15)
PLATELET # BLD: 243 E9/L (ref 130–450)
PMV BLD AUTO: 9.8 FL (ref 7–12)
POTASSIUM REFLEX MAGNESIUM: 4 MMOL/L (ref 3.5–5)
PRO-BNP: 92 PG/ML (ref 0–125)
RBC # BLD: 5.08 E12/L (ref 3.5–5.5)
SODIUM BLD-SCNC: 138 MMOL/L (ref 132–146)
TROPONIN: <0.01 NG/ML (ref 0–0.03)
WBC # BLD: 4.3 E9/L (ref 4.5–11.5)

## 2020-05-10 PROCEDURE — 2580000003 HC RX 258: Performed by: EMERGENCY MEDICINE

## 2020-05-10 PROCEDURE — 71275 CT ANGIOGRAPHY CHEST: CPT

## 2020-05-10 PROCEDURE — 71045 X-RAY EXAM CHEST 1 VIEW: CPT

## 2020-05-10 PROCEDURE — 99284 EMERGENCY DEPT VISIT MOD MDM: CPT

## 2020-05-10 PROCEDURE — 84484 ASSAY OF TROPONIN QUANT: CPT

## 2020-05-10 PROCEDURE — 85378 FIBRIN DEGRADE SEMIQUANT: CPT

## 2020-05-10 PROCEDURE — 80048 BASIC METABOLIC PNL TOTAL CA: CPT

## 2020-05-10 PROCEDURE — 36415 COLL VENOUS BLD VENIPUNCTURE: CPT

## 2020-05-10 PROCEDURE — 83880 ASSAY OF NATRIURETIC PEPTIDE: CPT

## 2020-05-10 PROCEDURE — 85025 COMPLETE CBC W/AUTO DIFF WBC: CPT

## 2020-05-10 PROCEDURE — 93005 ELECTROCARDIOGRAM TRACING: CPT | Performed by: EMERGENCY MEDICINE

## 2020-05-10 PROCEDURE — 6360000004 HC RX CONTRAST MEDICATION: Performed by: RADIOLOGY

## 2020-05-10 RX ORDER — ALBUTEROL SULFATE 90 UG/1
2 AEROSOL, METERED RESPIRATORY (INHALATION) EVERY 6 HOURS PRN
Status: DISCONTINUED | OUTPATIENT
Start: 2020-05-10 | End: 2020-05-10 | Stop reason: CLARIF

## 2020-05-10 RX ORDER — ALBUTEROL SULFATE 2.5 MG/3ML
2.5 SOLUTION RESPIRATORY (INHALATION) ONCE
Status: DISCONTINUED | OUTPATIENT
Start: 2020-05-10 | End: 2020-05-10

## 2020-05-10 RX ORDER — 0.9 % SODIUM CHLORIDE 0.9 %
500 INTRAVENOUS SOLUTION INTRAVENOUS ONCE
Status: COMPLETED | OUTPATIENT
Start: 2020-05-10 | End: 2020-05-10

## 2020-05-10 RX ORDER — ALBUTEROL SULFATE 90 UG/1
2 AEROSOL, METERED RESPIRATORY (INHALATION) EVERY 4 HOURS PRN
Qty: 1 INHALER | Refills: 1 | Status: SHIPPED | OUTPATIENT
Start: 2020-05-10 | End: 2020-08-23 | Stop reason: ALTCHOICE

## 2020-05-10 RX ORDER — ALBUTEROL SULFATE 90 UG/1
2 AEROSOL, METERED RESPIRATORY (INHALATION) EVERY 6 HOURS PRN
Status: DISCONTINUED | OUTPATIENT
Start: 2020-05-10 | End: 2020-05-10 | Stop reason: HOSPADM

## 2020-05-10 RX ORDER — ALBUTEROL SULFATE 2.5 MG/3ML
2.5 SOLUTION RESPIRATORY (INHALATION) EVERY 6 HOURS PRN
Status: DISCONTINUED | OUTPATIENT
Start: 2020-05-10 | End: 2020-05-10 | Stop reason: CLARIF

## 2020-05-10 RX ADMIN — IOPAMIDOL 80 ML: 755 INJECTION, SOLUTION INTRAVENOUS at 18:44

## 2020-05-10 RX ADMIN — SODIUM CHLORIDE 500 ML: 9 INJECTION, SOLUTION INTRAVENOUS at 17:50

## 2020-05-10 ASSESSMENT — ENCOUNTER SYMPTOMS
SORE THROAT: 0
NAUSEA: 0
CHEST TIGHTNESS: 0
SINUS PAIN: 0
BACK PAIN: 0
ABDOMINAL PAIN: 0
DIARRHEA: 0
SHORTNESS OF BREATH: 1
COUGH: 1
VOMITING: 0

## 2020-05-10 NOTE — ED PROVIDER NOTES
This is a 42-year-old female has a she has a past medical history of hypertension and diabetes but after she had weight loss surgery she no longer has these problems. States that for the past 3 days she has had a dry cough, fatigue and a temperature up to 101 degrees. She states that her  is hospitalized [place] for coronavirus. Unsure where that might of got it from because she has been staying at home and has not been around anybody else sick. Patient denies any recent travel, immobilizations or surgery. She does not smoke. The history is provided by the patient. No  was used. Review of Systems   Constitutional: Positive for fatigue and fever. Negative for activity change and chills. HENT: Negative for congestion, sinus pain and sore throat. Eyes: Negative for visual disturbance. Respiratory: Positive for cough and shortness of breath. Negative for chest tightness. Cardiovascular: Negative for chest pain, palpitations and leg swelling. Gastrointestinal: Negative for abdominal pain, diarrhea, nausea and vomiting. Genitourinary: Negative for dysuria and urgency. Musculoskeletal: Negative for back pain, neck pain and neck stiffness. Skin: Negative for rash. Neurological: Negative for dizziness, syncope and headaches. Psychiatric/Behavioral: Negative for confusion. Physical Exam  Vitals signs and nursing note reviewed. Constitutional:       General: She is not in acute distress. Appearance: She is well-developed. She is not diaphoretic. HENT:      Head: Normocephalic and atraumatic. Nose: Nose normal.      Mouth/Throat:      Mouth: Mucous membranes are moist.      Pharynx: No oropharyngeal exudate. Eyes:      Conjunctiva/sclera: Conjunctivae normal.      Pupils: Pupils are equal, round, and reactive to light. Neck:      Musculoskeletal: Normal range of motion and neck supple.    Cardiovascular:      Rate and Rhythm: Regular

## 2020-05-11 ENCOUNTER — CARE COORDINATION (OUTPATIENT)
Dept: CARE COORDINATION | Age: 60
End: 2020-05-11

## 2020-05-11 LAB
EKG ATRIAL RATE: 94 BPM
EKG P AXIS: 39 DEGREES
EKG P-R INTERVAL: 146 MS
EKG Q-T INTERVAL: 358 MS
EKG QRS DURATION: 60 MS
EKG QTC CALCULATION (BAZETT): 447 MS
EKG R AXIS: 13 DEGREES
EKG T AXIS: 29 DEGREES
EKG VENTRICULAR RATE: 94 BPM

## 2020-05-11 PROCEDURE — 93010 ELECTROCARDIOGRAM REPORT: CPT | Performed by: INTERNAL MEDICINE

## 2020-05-11 NOTE — CARE COORDINATION
Patient contacted regarding recent visit for viral symptoms. This Shania Solano contacted the patient by telephone to perform post discharge call. Verified name and  with patient as identifiers. Provided introduction to self, and reason for call due to viral symptoms of infection and/or exposure to COVID-19. Patient presented to emergency department/flu clinic with complaints of viral symptoms/exposure to COVID. Patient reports symptoms are worsening. Due to worsening symptoms the RN CTN/ACM was not notified for escalation. Discussed exposure protocols and quarantine with CDC Guidelines What To Do If You Are Sick    Patient was given an opportunity for questions and concerns. Stay home except to get medical care    Separate yourself from other people and animals in your home    Call ahead before visiting your doctor    Wear a facemask    Cover your coughs and sneezes    Clean your hands often    Avoid sharing personal household items    Clean all high-touch surfaces everyday    Monitor your symptoms  Seek prompt medical attention if your illness is worsening (e.g., difficulty breathing). Before seeking care, call your healthcare provider and tell them that you have, or are being evaluated for, COVID-19. Put on a facemask before you enter the facility. These steps will help the healthcare provider's office to keep other people in the office or waiting room from getting infected or exposed. Ask your healthcare provider to call the local or [facility] health department. Persons who are placed under If you have a medical emergency and need to call 911, notify the dispatch personnel that you have, or are being evaluated for COVID-19. If possible, put on a facemask before emergency medical services arrive. The patient agrees to contact the [facility] exposure line [phone], local health department [place] [facility]: ([phone]) and PCP office for questions related to their healthcare.  Author provided contact information for future reference. Patient/family/caregiver given information for [employer] and agrees to enroll yes  Patient's preferred e-mail: [email or web address]   Patient's preferred phone number: [phone]  Based on Loop alert triggers, patient will be contacted by nurse care manager for worsening symptoms. Fiona Geronimo reports feeling worse today with increased body pain and coughing. She declined having an RN reach out to her. She states she is agreeable to Loop. Her  is currently in the hospital for COVID-19. FEVER/nausea

## 2020-08-21 ENCOUNTER — HOSPITAL ENCOUNTER (OUTPATIENT)
Age: 60
Discharge: HOME | End: 2020-08-23

## 2020-08-21 PROCEDURE — 87081 CULTURE SCREEN ONLY: CPT

## 2020-08-22 LAB — CLOTEST: NORMAL

## 2020-08-23 ENCOUNTER — APPOINTMENT (OUTPATIENT)
Dept: CT IMAGING | Age: 60
End: 2020-08-23
Payer: COMMERCIAL

## 2020-08-23 ENCOUNTER — HOSPITAL ENCOUNTER (EMERGENCY)
Age: 60
Discharge: HOME | End: 2020-08-23
Attending: EMERGENCY MEDICINE
Payer: COMMERCIAL

## 2020-08-23 VITALS
RESPIRATION RATE: 16 BRPM | HEIGHT: 63 IN | DIASTOLIC BLOOD PRESSURE: 83 MMHG | OXYGEN SATURATION: 96 % | SYSTOLIC BLOOD PRESSURE: 144 MMHG | WEIGHT: 180 LBS | HEART RATE: 80 BPM | TEMPERATURE: 98 F | BODY MASS INDEX: 31.89 KG/M2

## 2020-08-23 LAB
ALBUMIN SERPL-MCNC: 4.1 G/DL (ref 3.5–5.2)
ALP BLD-CCNC: 85 U/L (ref 35–104)
ALT SERPL-CCNC: 14 U/L (ref 0–32)
ANION GAP SERPL CALCULATED.3IONS-SCNC: 10 MMOL/L (ref 7–16)
AST SERPL-CCNC: 27 U/L (ref 0–31)
BASOPHILS ABSOLUTE: 0.03 E9/L (ref 0–0.2)
BASOPHILS RELATIVE PERCENT: 0.7 % (ref 0–2)
BILIRUB SERPL-MCNC: 0.4 MG/DL (ref 0–1.2)
BILIRUBIN URINE: NEGATIVE
BLOOD, URINE: NEGATIVE
BUN BLDV-MCNC: 7 MG/DL (ref 6–20)
CALCIUM SERPL-MCNC: 9.3 MG/DL (ref 8.6–10.2)
CHLORIDE BLD-SCNC: 107 MMOL/L (ref 98–107)
CHP ED QC CHECK: NORMAL
CLARITY: CLEAR
CO2: 23 MMOL/L (ref 22–29)
COLOR: YELLOW
CREAT SERPL-MCNC: 0.7 MG/DL (ref 0.5–1)
EKG ATRIAL RATE: 52 BPM
EKG P AXIS: 36 DEGREES
EKG P-R INTERVAL: 160 MS
EKG Q-T INTERVAL: 462 MS
EKG QRS DURATION: 64 MS
EKG QTC CALCULATION (BAZETT): 429 MS
EKG R AXIS: -3 DEGREES
EKG T AXIS: 0 DEGREES
EKG VENTRICULAR RATE: 52 BPM
EOSINOPHILS ABSOLUTE: 0.08 E9/L (ref 0.05–0.5)
EOSINOPHILS RELATIVE PERCENT: 1.8 % (ref 0–6)
GFR AFRICAN AMERICAN: >60
GFR NON-AFRICAN AMERICAN: >60 ML/MIN/1.73
GLUCOSE BLD-MCNC: 63 MG/DL
GLUCOSE BLD-MCNC: 81 MG/DL (ref 74–99)
GLUCOSE URINE: NEGATIVE MG/DL
HCT VFR BLD CALC: 38.7 % (ref 34–48)
HEMOGLOBIN: 12.2 G/DL (ref 11.5–15.5)
IMMATURE GRANULOCYTES #: 0.01 E9/L
IMMATURE GRANULOCYTES %: 0.2 % (ref 0–5)
KETONES, URINE: NEGATIVE MG/DL
LEUKOCYTE ESTERASE, URINE: NEGATIVE
LYMPHOCYTES ABSOLUTE: 2.12 E9/L (ref 1.5–4)
LYMPHOCYTES RELATIVE PERCENT: 48 % (ref 20–42)
MCH RBC QN AUTO: 23.8 PG (ref 26–35)
MCHC RBC AUTO-ENTMCNC: 31.5 % (ref 32–34.5)
MCV RBC AUTO: 75.6 FL (ref 80–99.9)
METER GLUCOSE: 63 MG/DL (ref 74–99)
MONOCYTES ABSOLUTE: 0.31 E9/L (ref 0.1–0.95)
MONOCYTES RELATIVE PERCENT: 7 % (ref 2–12)
NEUTROPHILS ABSOLUTE: 1.87 E9/L (ref 1.8–7.3)
NEUTROPHILS RELATIVE PERCENT: 42.3 % (ref 43–80)
NITRITE, URINE: NEGATIVE
PDW BLD-RTO: 14.5 FL (ref 11.5–15)
PH UA: 6 (ref 5–9)
PLATELET # BLD: 293 E9/L (ref 130–450)
PMV BLD AUTO: 9 FL (ref 7–12)
POTASSIUM REFLEX MAGNESIUM: 4.1 MMOL/L (ref 3.5–5)
PRO-BNP: 72 PG/ML (ref 0–125)
PROTEIN UA: NEGATIVE MG/DL
RBC # BLD: 5.12 E12/L (ref 3.5–5.5)
SODIUM BLD-SCNC: 140 MMOL/L (ref 132–146)
SPECIFIC GRAVITY UA: >=1.03 (ref 1–1.03)
TOTAL PROTEIN: 7.1 G/DL (ref 6.4–8.3)
TROPONIN: <0.01 NG/ML (ref 0–0.03)
UROBILINOGEN, URINE: 0.2 E.U./DL
WBC # BLD: 4.4 E9/L (ref 4.5–11.5)

## 2020-08-23 PROCEDURE — 81003 URINALYSIS AUTO W/O SCOPE: CPT

## 2020-08-23 PROCEDURE — 96360 HYDRATION IV INFUSION INIT: CPT

## 2020-08-23 PROCEDURE — 84484 ASSAY OF TROPONIN QUANT: CPT

## 2020-08-23 PROCEDURE — 96361 HYDRATE IV INFUSION ADD-ON: CPT

## 2020-08-23 PROCEDURE — 2580000003 HC RX 258: Performed by: EMERGENCY MEDICINE

## 2020-08-23 PROCEDURE — 99284 EMERGENCY DEPT VISIT MOD MDM: CPT

## 2020-08-23 PROCEDURE — 82962 GLUCOSE BLOOD TEST: CPT

## 2020-08-23 PROCEDURE — 93010 ELECTROCARDIOGRAM REPORT: CPT | Performed by: INTERNAL MEDICINE

## 2020-08-23 PROCEDURE — 80053 COMPREHEN METABOLIC PANEL: CPT

## 2020-08-23 PROCEDURE — 83880 ASSAY OF NATRIURETIC PEPTIDE: CPT

## 2020-08-23 PROCEDURE — 93005 ELECTROCARDIOGRAM TRACING: CPT | Performed by: EMERGENCY MEDICINE

## 2020-08-23 PROCEDURE — 6370000000 HC RX 637 (ALT 250 FOR IP): Performed by: EMERGENCY MEDICINE

## 2020-08-23 PROCEDURE — 85025 COMPLETE CBC W/AUTO DIFF WBC: CPT

## 2020-08-23 PROCEDURE — 70450 CT HEAD/BRAIN W/O DYE: CPT

## 2020-08-23 PROCEDURE — 99283 EMERGENCY DEPT VISIT LOW MDM: CPT

## 2020-08-23 RX ORDER — MECLIZINE HCL 12.5 MG/1
12.5 TABLET ORAL 3 TIMES DAILY PRN
Qty: 15 TABLET | Refills: 0 | Status: SHIPPED | OUTPATIENT
Start: 2020-08-23 | End: 2020-08-28

## 2020-08-23 RX ORDER — MECLIZINE HCL 12.5 MG/1
12.5 TABLET ORAL ONCE
Status: COMPLETED | OUTPATIENT
Start: 2020-08-23 | End: 2020-08-23

## 2020-08-23 RX ORDER — 0.9 % SODIUM CHLORIDE 0.9 %
1000 INTRAVENOUS SOLUTION INTRAVENOUS ONCE
Status: COMPLETED | OUTPATIENT
Start: 2020-08-23 | End: 2020-08-23

## 2020-08-23 RX ADMIN — MECLIZINE 12.5 MG: 12.5 TABLET ORAL at 19:34

## 2020-08-23 RX ADMIN — SODIUM CHLORIDE 1000 ML: 9 INJECTION, SOLUTION INTRAVENOUS at 18:12

## 2020-08-24 NOTE — ED NOTES
Pt ambulated in the hallway and states that she is no dizzy while walking.      Merlene Mast RN  08/23/20 2005

## 2020-08-24 NOTE — ED NOTES
Discharge instructions given, medications and follow up instructions reviewed. Patient verbalized understanding, no other noted or stated problems at this time. Patient will follow up with physicians as directed.       Cruz Beckwith RN  08/23/20 2009

## 2020-08-24 NOTE — ED PROVIDER NOTES
HPI  Olimpia Poole is a 64 y.o. female with a PMHx significant for hypertension, hyperlipidemia and dysphasia (patient is status post gastric bypass in 2019) who presents with new onset dizziness. The patient reports that she was recently placed on an antibiotic for sinus infection. She states that she took the antibiotic for a week and that her sinus infection symptoms are better. However, she states that she feels as though she is now getting intermittently dizzy more frequently. She describes a sensation of the room spinning around her. She states that it can happen when she is standing, sitting or laying. She states that quick movements seem to exacerbate the dizziness. She states that nothing she can think of makes the dizziness better. She states that she has not taken any medications for symptoms. She denies any nausea and vomiting associated with the dizziness. The patient denies recent trauma, fever, chills, fatigue, HA, lightheadedness, vision changes, eye pain/discharge, congestion, rhinorrhea, ear pain, neck pain, chest pain, palpitations, hx of MI, hx of blood clots, LE edema, SOB, cough, wheezing, abdominal pain, N/V/D/C, hematochezia, melena, dysuria, hematuria, generalized weakness and paresthesias. The patient is currently taking no blood thinners. Tobacco Hx:   reports that she quit smoking about 30 years ago. Her smoking use included cigarettes. She started smoking about 42 years ago. She has a 5.00 pack-year smoking history. She has never used smokeless tobacco.    Alcohol Hx:   reports no history of alcohol use. Illicit Drug Hx:  Reports no history of illicit drug use. The history is provided by the patient. Last Tetanus (if applicable): N/A    Review of Systems   Constitutional: Negative for chills, diaphoresis, fatigue and fever. HENT: Negative for congestion, ear pain, postnasal drip, rhinorrhea, sinus pressure and sore throat.     Eyes: Negative for pain, discharge, redness and visual disturbance. Respiratory: Negative for cough, shortness of breath and wheezing. Cardiovascular: Negative for chest pain, palpitations and leg swelling. Gastrointestinal: Negative for abdominal distention, abdominal pain, blood in stool, constipation, diarrhea, nausea and vomiting. Genitourinary: Negative for difficulty urinating, dysuria, flank pain, frequency and hematuria. Musculoskeletal: Negative for arthralgias, back pain, myalgias and neck pain. Skin: Negative for rash and wound. Neurological: Positive for dizziness. Negative for syncope, facial asymmetry, speech difficulty, weakness, light-headedness, numbness and headaches. Hematological: Negative for adenopathy. All other systems reviewed and are negative. Physical Exam  Vitals signs and nursing note reviewed. Constitutional:       General: She is awake. She is not in acute distress. Appearance: She is not diaphoretic. HENT:      Head: Normocephalic and atraumatic. Right Ear: External ear normal.      Left Ear: External ear normal.      Nose: Nose normal. No congestion or rhinorrhea. Mouth/Throat:      Mouth: Mucous membranes are moist.      Pharynx: Oropharynx is clear. No oropharyngeal exudate or posterior oropharyngeal erythema. Eyes:      General: No visual field deficit or scleral icterus. Right eye: No discharge. Left eye: No discharge. Extraocular Movements: Extraocular movements intact. Conjunctiva/sclera: Conjunctivae normal.      Pupils: Pupils are equal, round, and reactive to light. Neck:      Musculoskeletal: Normal range of motion and neck supple. No neck rigidity or muscular tenderness. Vascular: No carotid bruit. Cardiovascular:      Rate and Rhythm: Normal rate and regular rhythm. Heart sounds: Normal heart sounds. No murmur. No friction rub. No gallop.        Comments: Upper extremity and lower extremity distal pulses intact Comprehensive Metabolic Panel w/ Reflex to MG   Result Value Ref Range    Sodium 140 132 - 146 mmol/L    Potassium reflex Magnesium 4.1 3.5 - 5.0 mmol/L    Chloride 107 98 - 107 mmol/L    CO2 23 22 - 29 mmol/L    Anion Gap 10 7 - 16 mmol/L    Glucose 81 74 - 99 mg/dL    BUN 7 6 - 20 mg/dL    CREATININE 0.7 0.5 - 1.0 mg/dL    GFR Non-African American >60 >=60 mL/min/1.73    GFR African American >60     Calcium 9.3 8.6 - 10.2 mg/dL    Total Protein 7.1 6.4 - 8.3 g/dL    Alb 4.1 3.5 - 5.2 g/dL    Total Bilirubin 0.4 0.0 - 1.2 mg/dL    Alkaline Phosphatase 85 35 - 104 U/L    ALT 14 0 - 32 U/L    AST 27 0 - 31 U/L   Troponin   Result Value Ref Range    Troponin <0.01 0.00 - 0.03 ng/mL   Brain Natriuretic Peptide   Result Value Ref Range    Pro-BNP 72 0 - 125 pg/mL   Urinalysis, reflex to microscopic   Result Value Ref Range    Color, UA Yellow Straw/Yellow    Clarity, UA Clear Clear    Glucose, Ur Negative Negative mg/dL    Bilirubin Urine Negative Negative    Ketones, Urine Negative Negative mg/dL    Specific Gravity, UA >=1.030 1.005 - 1.030    Blood, Urine Negative Negative    pH, UA 6.0 5.0 - 9.0    Protein, UA Negative Negative mg/dL    Urobilinogen, Urine 0.2 <2.0 E.U./dL    Nitrite, Urine Negative Negative    Leukocyte Esterase, Urine Negative Negative   POCT Glucose   Result Value Ref Range    Glucose 63 mg/dL    QC OK? ok    POCT Glucose   Result Value Ref Range    Meter Glucose 63 (L) 74 - 99 mg/dL   EKG 12 Lead   Result Value Ref Range    Ventricular Rate 52 BPM    Atrial Rate 52 BPM    P-R Interval 160 ms    QRS Duration 64 ms    Q-T Interval 462 ms    QTc Calculation (Bazett) 429 ms    P Axis 36 degrees    R Axis -3 degrees    T Axis 0 degrees       RADIOLOGY: Interpreted by Radiologist unless otherwise noted. CT Head WO Contrast   Final Result   There is no evidence of intracranial hemorrhage or mass effect, and no   calvarial traumatic findings are noted.        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 There is petrous temporal apical sclerosis bilaterally. EKG: As interpreted by this ER physician. Rate: 52 bpm  Rhythm: Sinus  Axis: normal  ST Segments: no acute change  T-Waves: no acute change  Interpretation: Sinus bradycardia, otherwise normal EKG  Comparison: changes compared to previous EKG on 5/10/2020    Oxygen Saturation Interpretation: Normal    Meds Given:  Medications   0.9 % sodium chloride bolus (0 mLs Intravenous Stopped 8/23/20 2004)   meclizine (ANTIVERT) tablet 12.5 mg (12.5 mg Oral Given 8/23/20 1934)       Procedures:  No procedures performed. --------------------------------- PROGRESS NOTES / ADDITIONAL PROVIDER NOTES ---------------------------------  Consultations:  As outlined below. ED Course:    ED Course as of Aug 23 2017   Sun Aug 23, 2020   5595 Point-of-care blood glucose was 63. Nursing was informed to give the patient something to eat. [ML]   2007 Nursing reports that the patient's fluids are finished running and that she was able to ambulate without recurrence of symptoms. Given this, we will discharge her with recommended follow-up in 2 to 3 days with her primary care physician. I will give her a prescription for Antivert. At this time she is stable and safe for discharge. [ML]      ED Course User Index  [ML] Hamlet Lizama DO       2013: All results were discussed with the patient and I have provided specific details regarding the plan of care, diagnosis and associated prognosis. The patient tolerated the visit well and, at the time of discharge, she was without objective evidence of hemodynamic instability or an acute process (biological or psychological) requiring hospitalization and inpatient management. She was seen by myself and the assigned attending physician, Dr. Carlos Ibrahim, who agreed with my assessment and plan as laid out herein.  The importance of follow-up was discussed at the

## 2020-08-25 ASSESSMENT — ENCOUNTER SYMPTOMS
ABDOMINAL PAIN: 0
DIARRHEA: 0
SINUS PRESSURE: 0
CONSTIPATION: 0
ABDOMINAL DISTENTION: 0
BLOOD IN STOOL: 0
BACK PAIN: 0
SHORTNESS OF BREATH: 0
RHINORRHEA: 0
WHEEZING: 0
EYE REDNESS: 0
VOMITING: 0
COUGH: 0
EYE PAIN: 0
EYE DISCHARGE: 0
NAUSEA: 0
SORE THROAT: 0

## 2020-12-21 ENCOUNTER — HOSPITAL ENCOUNTER (OUTPATIENT)
Dept: GENERAL RADIOLOGY | Age: 60
Discharge: HOME | End: 2020-12-23
Payer: COMMERCIAL

## 2020-12-21 PROCEDURE — 77063 BREAST TOMOSYNTHESIS BI: CPT

## 2021-07-29 ENCOUNTER — APPOINTMENT (OUTPATIENT)
Dept: GENERAL RADIOLOGY | Age: 61
End: 2021-07-29
Payer: COMMERCIAL

## 2021-07-29 ENCOUNTER — HOSPITAL ENCOUNTER (EMERGENCY)
Age: 61
Discharge: LEFT BEFORE DISCHARGE | End: 2021-07-29
Attending: EMERGENCY MEDICINE
Payer: COMMERCIAL

## 2021-07-29 VITALS
TEMPERATURE: 97 F | DIASTOLIC BLOOD PRESSURE: 82 MMHG | SYSTOLIC BLOOD PRESSURE: 161 MMHG | BODY MASS INDEX: 40.22 KG/M2 | OXYGEN SATURATION: 98 % | HEIGHT: 61 IN | RESPIRATION RATE: 14 BRPM | HEART RATE: 65 BPM | WEIGHT: 213 LBS

## 2021-07-29 DIAGNOSIS — Z53.21 PATIENT LEFT WITHOUT BEING SEEN: Primary | ICD-10-CM

## 2021-07-29 LAB
EKG ATRIAL RATE: 62 BPM
EKG P AXIS: 44 DEGREES
EKG P-R INTERVAL: 158 MS
EKG Q-T INTERVAL: 416 MS
EKG QRS DURATION: 66 MS
EKG QTC CALCULATION (BAZETT): 422 MS
EKG R AXIS: 6 DEGREES
EKG T AXIS: 12 DEGREES
EKG VENTRICULAR RATE: 62 BPM

## 2021-07-29 PROCEDURE — 4500000002 HC ER NO CHARGE

## 2021-07-29 PROCEDURE — 93005 ELECTROCARDIOGRAM TRACING: CPT | Performed by: PHYSICIAN ASSISTANT

## 2021-07-29 PROCEDURE — 99281 EMR DPT VST MAYX REQ PHY/QHP: CPT

## 2021-07-29 NOTE — ED NOTES
FIRST PROVIDER CONTACT ASSESSMENT NOTE       Department of Emergency Medicine   7/29/21  12:20 PM EDT    Chief Complaint: Chest Pain (started tuesday, went to dr. valencia and was sent to ed for further eval)    History of Present Illness:   Angelia Sandhoff is a 63 y.o. female who presents to the ED for chest pain that began on Tuesday     Focused Physical Exam:  VS:  BP (!) 161/82   Pulse 65   Temp 97 °F (36.1 °C)   Resp 14   Ht 5' 1\" (1.549 m)   Wt 213 lb (96.6 kg)   LMP 01/10/2013   SpO2 98%   BMI 40.25 kg/m²      General: Alert and in no apparent distress     Medical History:  has a past medical history of Hyperlipidemia, Hypertension, and Prediabetes. Surgical History:  has a past surgical history that includes Upper gastrointestinal endoscopy (01/09/2018); Colonoscopy (01/09/2018); and Gastric bypass surgery (09/2019). Social History:  reports that she quit smoking about 31 years ago. Her smoking use included cigarettes. She started smoking about 43 years ago. She has a 5.00 pack-year smoking history. She has never used smokeless tobacco. She reports that she does not drink alcohol and does not use drugs. Family History: family history includes Heart Attack in her maternal grandmother and paternal grandmother; Prostate Cancer in her paternal grandfather; Stroke in her maternal grandmother and paternal grandmother. *ALLERGIES*     Patient has no known allergies.      Initial Plan of Care:  Initiate Treatment-Testing, Proceed toTreatment Area When Bed Available for ED Attending/MLP to Continue Care    -----------------END OF FIRST PROVIDER CONTACT ASSESSMENT NOTE--------------  Electronically signed by Jenny Pizano PA-C   DD: 7/29/21         Jenny Pizano PA-C  07/29/21 [id number]

## 2021-10-14 NOTE — ED PROVIDER NOTES
Department of Emergency Medicine   ED  Provider Note  Admit Date/RoomTime: 9/19/2019  3:02 PM  ED Room: 07/07 9/19/19  3:27 PM    History of Present Illness:     Keyla Machado is a 64 y.o. female presenting to the ED for intermittent retrosternal CP, beginning 3 days ago. The complaint has been intermittent, moderate in severity, and worsened by nothing. Chest pain radiates to left shoulder and left neck. Last less than a minute non-positional.  He recently had a gastric bypass a month ago at that time per surgeon's recommendation her statin and antihypertensive meds were stopped for unknown reasons. She is still on liquid diet and feels very weak and dehydrated. She denies URI symptoms abdominal pain, nausea vomiting diarrhea or constipation, urinary symptoms, no history of thyroid issues. He was supposed to have an appointment today with her surgeon but because of chest pain went to see OP cardiology for the first time in [facility], she was referred to ED because of chest pain and hypotension. Review of Systems:   Pertinent positives and negatives are stated within HPI, all other systems reviewed and are negative.    --------------------------------------------- PAST HISTORY ---------------------------------------------  Past Medical History:  has a past medical history of Hyperlipidemia and Hypertension. Past Surgical History:  has a past surgical history that includes Upper gastrointestinal endoscopy (01/09/2018) and Colonoscopy (01/09/2018). Social History:  reports that she quit smoking about 29 years ago. Her smoking use included cigarettes. She started smoking about 41 years ago. She has a 5.00 pack-year smoking history. She has never used smokeless tobacco. She reports that she does not drink alcohol or use drugs.     Family History: family history includes Heart Attack in her maternal grandmother and paternal grandmother; Prostate Cancer in her paternal grandfather; Stroke in her October 14, 2021      [address]     [place] [address]      Your healthcare team cares about your health. To provide you with the best care,   we have reviewed your chart and based on our findings, we see that you are due to:     - DIABETES FOLLOW UP: Schedule a DIABETIC EYE EXAM.  This exam is done with an optometrist. You can schedule this appointment with your eye doctor.  If you need a referral, please let us know.    If you have already completed these items, please contact the clinic via phone or   [employer] so your care team can review and update your records. Thank you for   choosing [facility] for your healthcare needs. For any questions,   concerns, or to schedule an appointment please contact the clinic.       Healthy Regards,      Your [facility] Care Team          Enclosure: N/A

## 2021-12-18 ENCOUNTER — HOSPITAL ENCOUNTER (EMERGENCY)
Age: 61
Discharge: HOME | End: 2021-12-18
Attending: STUDENT IN AN ORGANIZED HEALTH CARE EDUCATION/TRAINING PROGRAM
Payer: COMMERCIAL

## 2021-12-18 ENCOUNTER — APPOINTMENT (OUTPATIENT)
Dept: GENERAL RADIOLOGY | Age: 61
End: 2021-12-18
Payer: COMMERCIAL

## 2021-12-18 VITALS
BODY MASS INDEX: 33.13 KG/M2 | DIASTOLIC BLOOD PRESSURE: 92 MMHG | HEART RATE: 72 BPM | WEIGHT: 180 LBS | RESPIRATION RATE: 20 BRPM | OXYGEN SATURATION: 98 % | HEIGHT: 62 IN | TEMPERATURE: 98.5 F | SYSTOLIC BLOOD PRESSURE: 164 MMHG

## 2021-12-18 DIAGNOSIS — R07.89 CHEST WALL PAIN: Primary | ICD-10-CM

## 2021-12-18 DIAGNOSIS — M94.0 COSTOCHONDRITIS, ACUTE: ICD-10-CM

## 2021-12-18 LAB
ALBUMIN SERPL-MCNC: 4.2 G/DL (ref 3.5–5.2)
ALP BLD-CCNC: 136 U/L (ref 35–104)
ALT SERPL-CCNC: 17 U/L (ref 0–32)
ANION GAP SERPL CALCULATED.3IONS-SCNC: 12 MMOL/L (ref 7–16)
AST SERPL-CCNC: 26 U/L (ref 0–31)
BASOPHILS ABSOLUTE: 0.04 E9/L (ref 0–0.2)
BASOPHILS RELATIVE PERCENT: 0.8 % (ref 0–2)
BILIRUB SERPL-MCNC: 0.4 MG/DL (ref 0–1.2)
BUN BLDV-MCNC: 8 MG/DL (ref 6–20)
CALCIUM SERPL-MCNC: 9.5 MG/DL (ref 8.6–10.2)
CHLORIDE BLD-SCNC: 103 MMOL/L (ref 98–107)
CO2: 25 MMOL/L (ref 22–29)
CREAT SERPL-MCNC: 0.7 MG/DL (ref 0.5–1)
EOSINOPHILS ABSOLUTE: 0.08 E9/L (ref 0.05–0.5)
EOSINOPHILS RELATIVE PERCENT: 1.6 % (ref 0–6)
GFR AFRICAN AMERICAN: >60
GFR NON-AFRICAN AMERICAN: >60 ML/MIN/1.73
GLUCOSE BLD-MCNC: 90 MG/DL (ref 74–99)
HCT VFR BLD CALC: 42 % (ref 34–48)
HEMOGLOBIN: 13 G/DL (ref 11.5–15.5)
IMMATURE GRANULOCYTES #: 0.01 E9/L
IMMATURE GRANULOCYTES %: 0.2 % (ref 0–5)
LYMPHOCYTES ABSOLUTE: 2.05 E9/L (ref 1.5–4)
LYMPHOCYTES RELATIVE PERCENT: 41.4 % (ref 20–42)
MCH RBC QN AUTO: 23.2 PG (ref 26–35)
MCHC RBC AUTO-ENTMCNC: 31 % (ref 32–34.5)
MCV RBC AUTO: 75 FL (ref 80–99.9)
MONOCYTES ABSOLUTE: 0.37 E9/L (ref 0.1–0.95)
MONOCYTES RELATIVE PERCENT: 7.5 % (ref 2–12)
NEUTROPHILS ABSOLUTE: 2.4 E9/L (ref 1.8–7.3)
NEUTROPHILS RELATIVE PERCENT: 48.5 % (ref 43–80)
PDW BLD-RTO: 14.7 FL (ref 11.5–15)
PLATELET # BLD: 289 E9/L (ref 130–450)
PMV BLD AUTO: 9.8 FL (ref 7–12)
POTASSIUM SERPL-SCNC: 4 MMOL/L (ref 3.5–5)
RBC # BLD: 5.6 E12/L (ref 3.5–5.5)
SARS-COV-2, NAAT: NOT DETECTED
SODIUM BLD-SCNC: 140 MMOL/L (ref 132–146)
TOTAL PROTEIN: 7.6 G/DL (ref 6.4–8.3)
TROPONIN, HIGH SENSITIVITY: 7 NG/L (ref 0–9)
WBC # BLD: 5 E9/L (ref 4.5–11.5)

## 2021-12-18 PROCEDURE — 93005 ELECTROCARDIOGRAM TRACING: CPT | Performed by: NURSE PRACTITIONER

## 2021-12-18 PROCEDURE — 85025 COMPLETE CBC W/AUTO DIFF WBC: CPT

## 2021-12-18 PROCEDURE — 6360000002 HC RX W HCPCS: Performed by: STUDENT IN AN ORGANIZED HEALTH CARE EDUCATION/TRAINING PROGRAM

## 2021-12-18 PROCEDURE — 71046 X-RAY EXAM CHEST 2 VIEWS: CPT

## 2021-12-18 PROCEDURE — 99283 EMERGENCY DEPT VISIT LOW MDM: CPT

## 2021-12-18 PROCEDURE — 80053 COMPREHEN METABOLIC PANEL: CPT

## 2021-12-18 PROCEDURE — 96374 THER/PROPH/DIAG INJ IV PUSH: CPT

## 2021-12-18 PROCEDURE — 87635 SARS-COV-2 COVID-19 AMP PRB: CPT

## 2021-12-18 PROCEDURE — 84484 ASSAY OF TROPONIN QUANT: CPT

## 2021-12-18 RX ORDER — KETOROLAC TROMETHAMINE 30 MG/ML
30 INJECTION, SOLUTION INTRAMUSCULAR; INTRAVENOUS ONCE
Status: COMPLETED | OUTPATIENT
Start: 2021-12-18 | End: 2021-12-18

## 2021-12-18 RX ADMIN — KETOROLAC TROMETHAMINE 30 MG: 30 INJECTION, SOLUTION INTRAMUSCULAR at 18:58

## 2021-12-18 ASSESSMENT — PAIN SCALES - GENERAL: PAINLEVEL_OUTOF10: 5

## 2021-12-18 NOTE — ED PROVIDER NOTES
ED  Provider Note  Admit Date/RoomTime: 12/18/2021  6:03 PM  ED Room: [facility]      History of Present Illness:  12/18/21, Time: 6:20 PM EST  Chief Complaint   Patient presents with    Chest Pain     Midsternal since 1230 today denies SOB          Edna Trevizo is a 63 y.o. female presenting to the ED for CP   Onset: sudden, while driving   Timing: persistent    Duration: hours  Associated symptoms: no N/V, diaphoresis, no radiation, no arm/jaw/back pain, no exertional pain, no SOB, no abd pain, no prior episodes of same. Had just received \"bad news\" before onset. 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 No fever/chills, no c/c/r. Severity: mild to moderate   Exacerbated by: none   Relieved by: none         Review of Systems:   A complete review of systems was performed and pertinent positives and negatives are stated within HPI, all other systems reviewed and are negative.        --------------------------------------------- PATIENT HISTORY--------------------------------------------  Past Medical History:  has a past medical history of Hyperlipidemia, Hypertension, and Prediabetes. Past Surgical History:  has a past surgical history that includes Upper gastrointestinal endoscopy (01/09/2018); Colonoscopy (01/09/2018); and Gastric bypass surgery (09/2019). Family History: family history includes Heart Attack in her maternal grandmother and paternal grandmother; Prostate Cancer in her paternal grandfather; Stroke in her maternal grandmother and paternal grandmother. . Unless otherwise noted, family history is non contributory    Social History:  reports that she quit smoking about 31 years ago. Her smoking use included cigarettes. She started smoking about 43 years ago. She has a 5.00 pack-year smoking history. She has never used smokeless tobacco. She reports that she does not drink alcohol and does not use drugs.     The patients home medications have been reviewed. Allergies: Patient has no known allergies. I have reviewed the past medical history, past surgical history, social history, and family history    ---------------------------------------------------PHYSICAL EXAM--------------------------------------    Constitutional/General: Alert and oriented x3  Head: Normocephalic and atraumatic  Eyes: PERRL, EOMI, sclera non icteric  ENT: Oropharynx clear, handling secretions, no trismus  Neck: Supple, full ROM, no stridor, no meningismus  Respiratory: lctab  Cardiovascular: RRR, no R/G/M, 2+ peripheral pulses  Chest: + chest wall tenderness, equal chest rise  Gastrointestinal:  S/nt/nd  Musculoskeletal: Extremities warm and well perfused, moving all extremities  Skin: skin warm and dry. No rashes. Neurologic: No focal deficits, strength and sensation grossly intact   Psychiatric: Normal Affect, behavior normal           -------------------------------------------------- RESULTS -------------------------------------------------  I have personally reviewed all laboratory and imaging results for this patient. Results are listed below.      LABS: (Lab results interpreted by me)  Results for orders placed or performed during the hospital encounter of 12/18/21   COVID-19, Rapid    Specimen: Nasopharyngeal Swab   Result Value Ref Range    SARS-CoV-2, NAAT Not Detected Not Detected   CBC auto differential   Result Value Ref Range    WBC 5.0 4.5 - 11.5 E9/L    RBC 5.60 (H) 3.50 - 5.50 E12/L    Hemoglobin 13.0 11.5 - 15.5 g/dL    Hematocrit 42.0 34.0 - 48.0 %    MCV 75.0 (L) 80.0 - 99.9 fL    MCH 23.2 (L) 26.0 - 35.0 pg    MCHC 31.0 (L) 32.0 - 34.5 %    RDW 14.7 11.5 - 15.0 fL    Platelets 361 116 - 302 E9/L    MPV 9.8 7.0 - 12.0 fL    Neutrophils % 48.5 43.0 - 80.0 %    Immature Granulocytes % 0.2 0.0 - 5.0 %    Lymphocytes % 41.4 20.0 - 42.0 %    Monocytes % 7.5 2.0 - 12.0 %    Eosinophils % 1.6 0.0 - 6.0 %    Basophils % 0.8 0.0 - 2.0 % Neutrophils Absolute 2.40 1.80 - 7.30 E9/L    Immature Granulocytes # 0.01 E9/L    Lymphocytes Absolute 2.05 1.50 - 4.00 E9/L    Monocytes Absolute 0.37 0.10 - 0.95 E9/L    Eosinophils Absolute 0.08 0.05 - 0.50 E9/L    Basophils Absolute 0.04 0.00 - 0.20 E9/L   Comprehensive Metabolic Panel   Result Value Ref Range    Sodium 140 132 - 146 mmol/L    Potassium 4.0 3.5 - 5.0 mmol/L    Chloride 103 98 - 107 mmol/L    CO2 25 22 - 29 mmol/L    Anion Gap 12 7 - 16 mmol/L    Glucose 90 74 - 99 mg/dL    BUN 8 6 - 20 mg/dL    CREATININE 0.7 0.5 - 1.0 mg/dL    GFR Non-African American >60 >=60 mL/min/1.73    GFR African American >60     Calcium 9.5 8.6 - 10.2 mg/dL    Total Protein 7.6 6.4 - 8.3 g/dL    Albumin 4.2 3.5 - 5.2 g/dL    Total Bilirubin 0.4 0.0 - 1.2 mg/dL    Alkaline Phosphatase 136 (H) 35 - 104 U/L    ALT 17 0 - 32 U/L    AST 26 0 - 31 U/L   Troponin   Result Value Ref Range    Troponin, High Sensitivity 7 0 - 9 ng/L   EKG 12 Lead   Result Value Ref Range    Ventricular Rate 63 BPM    Atrial Rate 63 BPM    P-R Interval 138 ms    QRS Duration 62 ms    Q-T Interval 404 ms    QTc Calculation (Bazett) 413 ms    P Axis 69 degrees    R Axis 36 degrees    T Axis 46 degrees   ,       RADIOLOGY:  Imaging interpreted by Radiologist unless otherwise specified  XR CHEST (2 VW)   Final Result   No acute process. ------------------------- NURSING NOTES AND VITALS REVIEWED ---------------------------  The nursing notes within the ED encounter and vital signs as below have been reviewed by myself  BP (!) 164/92   Pulse 72   Temp 98.5 °F (36.9 °C) (Oral)   Resp 20   Ht 5' 2\" (1.575 m)   Wt 180 lb (81.6 kg)   LMP 01/10/2013   SpO2 98%   BMI 32.92 kg/m²      The patients available past medical records and past encounters were reviewed.         ------------------------------ ED COURSE/MEDICAL DECISION MAKING----------------------  Medications   ketorolac (TORADOL) injection 30 mg (30 mg IntraVENous Given 12/18/21 1858)       I, Dr. Trenton Bell, am the primary provider of record    Medical Decision Making:   Teresa Vital is a 63 y.o. female presenting to the ED for chest pain. Suspect costochondritis due to history and exam. Doubt ACS/MI. Differential includes but not limited to ACS/MI, Aortic dissection, GERD/gastritis/PUD, PE, PTX, PNA, pleural effusion, MSK pain. Workup: CBC, BMP, Troponin, EKG, CXR   Disposition: pending workup     Patient will speak to PCP regarding possible cards f/u if symptoms persist, and strict return precautions given     ED Counseling: This emergency provider has spoken with the patient and any family present to discuss clinical status, results, plan of care, diagnosis and prognosis as able to be determined at this time. Any questions were answered and patient and/or family/POA are agreeable with the plan.       --------------------------------- IMPRESSION AND DISPOSITION ---------------------------------    IMPRESSION  1. Chest wall pain    2. Costochondritis, acute        DISPOSITION  Disposition: Discharge to home  Patient condition is good      This report was transcribed using voice recognition software. Every effort was made to ensure accuracy; however, transcription errors may be present.          Thai Short MD  12/19/21 [id number]

## 2021-12-18 NOTE — ED TRIAGE NOTES
Department of Emergency Medicine  FIRST PROVIDER TRIAGE NOTE             Independent MLP           12/18/21  5:14 PM EST    Date of Encounter: 12/18/21   MRN: [id number]      HPI: Jan Jackson is a 63 y.o. female who presents to the ED for Chest Pain (Midsternal since 1230 today denies SOB )      ROS: Negative for abd pain or fever. PE: Gen Appearance/Constitutional: alert  Pulm: respirations non-labored     Initial Plan of Care: All treatment areas with department are currently occupied. Plan to order/Initiate the following while awaiting opening in ED: labs, EKG and imaging studies.     Initial Plan of Care: Initiate Treatment-Testing, Proceed toTreatment Area When Bed Available for ED Attending/MLP to Continue Care    Electronically signed by MAXIMUS Cooper CNP   DD: 12/18/21

## 2021-12-20 LAB
EKG ATRIAL RATE: 63 BPM
EKG P AXIS: 69 DEGREES
EKG P-R INTERVAL: 138 MS
EKG Q-T INTERVAL: 404 MS
EKG QRS DURATION: 62 MS
EKG QTC CALCULATION (BAZETT): 413 MS
EKG R AXIS: 36 DEGREES
EKG T AXIS: 46 DEGREES
EKG VENTRICULAR RATE: 63 BPM

## 2022-10-18 ENCOUNTER — APPOINTMENT (OUTPATIENT)
Dept: CT IMAGING | Age: 62
End: 2022-10-18
Payer: COMMERCIAL

## 2022-10-18 ENCOUNTER — HOSPITAL ENCOUNTER (EMERGENCY)
Age: 62
Discharge: HOME | End: 2022-10-18
Attending: EMERGENCY MEDICINE
Payer: COMMERCIAL

## 2022-10-18 VITALS
TEMPERATURE: 99.4 F | HEART RATE: 112 BPM | SYSTOLIC BLOOD PRESSURE: 117 MMHG | OXYGEN SATURATION: 98 % | DIASTOLIC BLOOD PRESSURE: 70 MMHG | RESPIRATION RATE: 18 BRPM

## 2022-10-18 DIAGNOSIS — R10.84 GENERALIZED ABDOMINAL PAIN: Primary | ICD-10-CM

## 2022-10-18 DIAGNOSIS — R91.8 LUNG MASS: ICD-10-CM

## 2022-10-18 DIAGNOSIS — C41.9: ICD-10-CM

## 2022-10-18 DIAGNOSIS — C79.51 MALIGNANT NEOPLASM METASTATIC TO BONE (HCC): ICD-10-CM

## 2022-10-18 LAB
ALBUMIN SERPL-MCNC: 3.5 G/DL (ref 3.5–5.2)
ALP BLD-CCNC: 317 U/L (ref 35–104)
ALT SERPL-CCNC: 31 U/L (ref 0–32)
ANION GAP SERPL CALCULATED.3IONS-SCNC: 13 MMOL/L (ref 7–16)
AST SERPL-CCNC: 66 U/L (ref 0–31)
BACTERIA: ABNORMAL /HPF
BASOPHILS ABSOLUTE: 0.03 E9/L (ref 0–0.2)
BASOPHILS RELATIVE PERCENT: 0.5 % (ref 0–2)
BILIRUB SERPL-MCNC: 0.4 MG/DL (ref 0–1.2)
BILIRUBIN URINE: ABNORMAL
BLOOD, URINE: ABNORMAL
BUN BLDV-MCNC: 8 MG/DL (ref 6–23)
CALCIUM SERPL-MCNC: 9.1 MG/DL (ref 8.6–10.2)
CHLORIDE BLD-SCNC: 103 MMOL/L (ref 98–107)
CLARITY: CLEAR
CO2: 20 MMOL/L (ref 22–29)
COARSE CASTS, UA: ABNORMAL /LPF (ref 0–2)
COLOR: ABNORMAL
CREAT SERPL-MCNC: 0.7 MG/DL (ref 0.5–1)
EKG ATRIAL RATE: 90 BPM
EKG P AXIS: 56 DEGREES
EKG P-R INTERVAL: 152 MS
EKG Q-T INTERVAL: 364 MS
EKG QRS DURATION: 66 MS
EKG QTC CALCULATION (BAZETT): 445 MS
EKG R AXIS: 31 DEGREES
EKG T AXIS: 25 DEGREES
EKG VENTRICULAR RATE: 90 BPM
EOSINOPHILS ABSOLUTE: 0.03 E9/L (ref 0.05–0.5)
EOSINOPHILS RELATIVE PERCENT: 0.5 % (ref 0–6)
GFR SERPL CREATININE-BSD FRML MDRD: >60 ML/MIN/1.73
GLUCOSE BLD-MCNC: 92 MG/DL (ref 74–99)
GLUCOSE URINE: NEGATIVE MG/DL
HCT VFR BLD CALC: 36 % (ref 34–48)
HEMOGLOBIN: 11.6 G/DL (ref 11.5–15.5)
HYALINE CASTS: ABNORMAL /LPF (ref 0–2)
IMMATURE GRANULOCYTES #: 0.01 E9/L
IMMATURE GRANULOCYTES %: 0.2 % (ref 0–5)
KETONES, URINE: NEGATIVE MG/DL
LACTIC ACID: 1.3 MMOL/L (ref 0.5–2.2)
LEUKOCYTE ESTERASE, URINE: NEGATIVE
LIPASE: 46 U/L (ref 13–60)
LYMPHOCYTES ABSOLUTE: 1.39 E9/L (ref 1.5–4)
LYMPHOCYTES RELATIVE PERCENT: 22 % (ref 20–42)
MCH RBC QN AUTO: 23.3 PG (ref 26–35)
MCHC RBC AUTO-ENTMCNC: 32.2 % (ref 32–34.5)
MCV RBC AUTO: 72.3 FL (ref 80–99.9)
MONOCYTES ABSOLUTE: 0.59 E9/L (ref 0.1–0.95)
MONOCYTES RELATIVE PERCENT: 9.4 % (ref 2–12)
MUCUS: PRESENT /LPF
NEUTROPHILS ABSOLUTE: 4.26 E9/L (ref 1.8–7.3)
NEUTROPHILS RELATIVE PERCENT: 67.4 % (ref 43–80)
NITRITE, URINE: NEGATIVE
PDW BLD-RTO: 15 FL (ref 11.5–15)
PH UA: 6 (ref 5–9)
PLATELET # BLD: 459 E9/L (ref 130–450)
PMV BLD AUTO: 8.9 FL (ref 7–12)
POTASSIUM SERPL-SCNC: 3.6 MMOL/L (ref 3.5–5)
PROTEIN UA: 30 MG/DL
RBC # BLD: 4.98 E12/L (ref 3.5–5.5)
RBC UA: ABNORMAL /HPF (ref 0–2)
SODIUM BLD-SCNC: 136 MMOL/L (ref 132–146)
SPECIFIC GRAVITY UA: >=1.03 (ref 1–1.03)
TOTAL PROTEIN: 7.1 G/DL (ref 6.4–8.3)
TROPONIN, HIGH SENSITIVITY: 9 NG/L (ref 0–9)
UROBILINOGEN, URINE: 0.2 E.U./DL
WBC # BLD: 6.3 E9/L (ref 4.5–11.5)
WBC UA: ABNORMAL /HPF (ref 0–5)

## 2022-10-18 PROCEDURE — 84484 ASSAY OF TROPONIN QUANT: CPT

## 2022-10-18 PROCEDURE — 81001 URINALYSIS AUTO W/SCOPE: CPT

## 2022-10-18 PROCEDURE — 93005 ELECTROCARDIOGRAM TRACING: CPT | Performed by: EMERGENCY MEDICINE

## 2022-10-18 PROCEDURE — 74177 CT ABD & PELVIS W/CONTRAST: CPT

## 2022-10-18 PROCEDURE — 83605 ASSAY OF LACTIC ACID: CPT

## 2022-10-18 PROCEDURE — 83690 ASSAY OF LIPASE: CPT

## 2022-10-18 PROCEDURE — 71260 CT THORAX DX C+: CPT

## 2022-10-18 PROCEDURE — 80053 COMPREHEN METABOLIC PANEL: CPT

## 2022-10-18 PROCEDURE — 6360000004 HC RX CONTRAST MEDICATION: Performed by: RADIOLOGY

## 2022-10-18 PROCEDURE — 96360 HYDRATION IV INFUSION INIT: CPT

## 2022-10-18 PROCEDURE — 99285 EMERGENCY DEPT VISIT HI MDM: CPT

## 2022-10-18 PROCEDURE — 2580000003 HC RX 258

## 2022-10-18 PROCEDURE — 96361 HYDRATE IV INFUSION ADD-ON: CPT

## 2022-10-18 PROCEDURE — 85025 COMPLETE CBC W/AUTO DIFF WBC: CPT

## 2022-10-18 PROCEDURE — 87088 URINE BACTERIA CULTURE: CPT

## 2022-10-18 RX ORDER — FAMOTIDINE 20 MG/1
20 TABLET, FILM COATED ORAL 2 TIMES DAILY
Qty: 60 TABLET | Refills: 0 | Status: SHIPPED | OUTPATIENT
Start: 2022-10-18 | End: 2022-11-17

## 2022-10-18 RX ORDER — PANTOPRAZOLE SODIUM 40 MG/1
40 TABLET, DELAYED RELEASE ORAL
Qty: 30 TABLET | Refills: 3 | Status: SHIPPED | OUTPATIENT
Start: 2022-10-18 | End: 2022-11-17

## 2022-10-18 RX ORDER — 0.9 % SODIUM CHLORIDE 0.9 %
1000 INTRAVENOUS SOLUTION INTRAVENOUS ONCE
Status: COMPLETED | OUTPATIENT
Start: 2022-10-18 | End: 2022-10-18

## 2022-10-18 RX ORDER — SUCRALFATE 1 G/1
1 TABLET ORAL 4 TIMES DAILY
Qty: 120 TABLET | Refills: 0 | Status: SHIPPED | OUTPATIENT
Start: 2022-10-18 | End: 2022-11-17

## 2022-10-18 RX ADMIN — SODIUM CHLORIDE 1000 ML: 9 INJECTION, SOLUTION INTRAVENOUS at 16:13

## 2022-10-18 RX ADMIN — SODIUM CHLORIDE 1000 ML: 9 INJECTION, SOLUTION INTRAVENOUS at 19:32

## 2022-10-18 RX ADMIN — IOPAMIDOL 65 ML: 755 INJECTION, SOLUTION INTRAVENOUS at 18:29

## 2022-10-18 RX ADMIN — IOPAMIDOL 75 ML: 755 INJECTION, SOLUTION INTRAVENOUS at 17:26

## 2022-10-18 ASSESSMENT — ENCOUNTER SYMPTOMS
ABDOMINAL DISTENTION: 0
BACK PAIN: 0
EYE DISCHARGE: 0
NAUSEA: 0
APNEA: 0
EYE ITCHING: 0
CONSTIPATION: 0
BLOOD IN STOOL: 0
DIARRHEA: 0
CHEST TIGHTNESS: 0
COLOR CHANGE: 0
ABDOMINAL PAIN: 1

## 2022-10-18 ASSESSMENT — PAIN DESCRIPTION - LOCATION: LOCATION: ABDOMEN

## 2022-10-18 ASSESSMENT — PAIN - FUNCTIONAL ASSESSMENT: PAIN_FUNCTIONAL_ASSESSMENT: 0-10

## 2022-10-18 ASSESSMENT — PAIN SCALES - GENERAL: PAINLEVEL_OUTOF10: 5

## 2022-10-18 NOTE — ED PROVIDER NOTES
Tamara Christine 63 y.o. female PHMx of  presents to the ED c/o abdominal pain for the past 2 weeks. Reports she had a gastric bypass surgery 2 years ago, does not take any medications besides a at home laxative, was concerned she might be have worsening GERD. Abdominal pain gets worse anytime she eats. . Onset: 2 Weeks ago. Location/Radiation: Lower abdomen. Duration: Intermittent, comes in waves. Characterization: Burning sensation. Aggravating Factors: Eating. Relieving Factors: sitting up, not lying flat, not eating. Severity: Moderate. Assx Sxs: History of acid reflux, obesity, history of gastric bypass surgery. She Denies: Fever/chills, nausea/vomiting, numbness/tingling, dysuria/hematuria. Review of Systems   Constitutional:  Negative for activity change, appetite change and fever. HENT:  Negative for congestion and ear pain. Eyes:  Negative for discharge and itching. Respiratory:  Negative for apnea and chest tightness. Cardiovascular:  Negative for chest pain, palpitations and leg swelling. Gastrointestinal:  Positive for abdominal pain. Negative for abdominal distention, blood in stool, constipation, diarrhea and nausea. Endocrine: Negative for cold intolerance and heat intolerance. Genitourinary:  Negative for difficulty urinating and dysuria. Musculoskeletal:  Negative for arthralgias and back pain. Skin:  Negative for color change and pallor. Allergic/Immunologic: Negative for environmental allergies and food allergies. Neurological:  Negative for dizziness and facial asymmetry. Hematological:  Negative for adenopathy. Does not bruise/bleed easily. Psychiatric/Behavioral:  Negative for agitation and behavioral problems. Physical Exam  Constitutional:       General: She is not in acute distress. Appearance: She is well-developed. She is obese. She is not ill-appearing, toxic-appearing or diaphoretic.    HENT:      Head: Normocephalic and atraumatic. Mouth/Throat:      Pharynx: Oropharynx is clear. Eyes:      Extraocular Movements: Extraocular movements intact. Pupils: Pupils are equal, round, and reactive to light. Cardiovascular:      Rate and Rhythm: Normal rate and regular rhythm. Heart sounds: Normal heart sounds. No murmur heard. No gallop. Pulmonary:      Effort: Pulmonary effort is normal.      Breath sounds: Normal breath sounds. Abdominal:      General: Bowel sounds are normal.      Palpations: Abdomen is soft. There is no shifting dullness, fluid wave, hepatomegaly, splenomegaly, mass or pulsatile mass. Tenderness: There is no abdominal tenderness. Hernia: No hernia is present. Skin:     General: Skin is warm and dry. Neurological:      General: No focal deficit present. Mental Status: She is alert and oriented to person, place, and time. Cranial Nerves: No cranial nerve deficit. Motor: No weakness. Psychiatric:         Mood and Affect: Mood normal.         Behavior: Behavior normal.        Procedures     MDM  Number of Diagnoses or Management Options  Generalized abdominal pain  Lung mass  Malignant neoplasm metastatic to bone (HCC)  Osteoblastic sarcoma (HCC)  Diagnosis management comments: This is a very pleasant 61-year-old female that presents to the ER complaining of abdominal pain for the past 2 weeks. Upon evaluation she was AOx4, afebrile, nontender to palpation abdomen and soft, normotensive, neurovascularly intact, nonhypoxic on room air. Patient's laboratory evaluation including CBC and CMP were within normal limits and unremarkable. Normal troponin and lactic acid. CT abdomen pelvis with IV contrast and CT chest with contrast showed great concern of osteoblastic metastatic disease and the nodule located in the left lower lobe measuring 14 mm. Also concern of diffuse osseous metastasis. Patient reports she has no family history of cancer.   Patient was counseled on these imaging findings and the need for oncology follow-up. She was given oncologist at the [facility] for follow-up and her primary care physician. At time of counseling with the patient she reported she was ready to go did not want to finish her IV fluids and that she would follow-up with the oncologist for further imaging and evaluation. Patient was again counseled on the importance of this follow-up due to the CT scanning findings. Time of discharge patient was seen medically stable, normotensive. Return precautions to the ER given. She verbally consented agreed to the plan. Patient stable at time of discharge.                   --------------------------------------------- PAST HISTORY ---------------------------------------------  Past Medical History:  has a past medical history of Hyperlipidemia, Hypertension, and Prediabetes. Past Surgical History:  has a past surgical history that includes Upper gastrointestinal endoscopy (01/09/2018); Colonoscopy (01/09/2018); and Gastric bypass surgery (09/2019). Social History:  reports that she quit smoking about 32 years ago. Her smoking use included cigarettes. She started smoking about 44 years ago. She has a 5.00 pack-year smoking history. She has never used smokeless tobacco. She reports that she does not drink alcohol and does not use drugs. Family History: family history includes Heart Attack in her maternal grandmother and paternal grandmother; Prostate Cancer in her paternal grandfather; Stroke in her maternal grandmother and paternal grandmother. The patients home medications have been reviewed. Allergies: Patient has no known allergies.     -------------------------------------------------- RESULTS -------------------------------------------------  Labs:  Results for orders placed or performed during the hospital encounter of 10/18/22   CBC with Auto Differential   Result Value Ref Range    WBC 6.3 4.5 - 11.5 E9/L    RBC 4.98 3.50 - 5.50 E12/L    Hemoglobin 11.6 11.5 - 15.5 g/dL    Hematocrit 36.0 34.0 - 48.0 %    MCV 72.3 (L) 80.0 - 99.9 fL    MCH 23.3 (L) 26.0 - 35.0 pg    MCHC 32.2 32.0 - 34.5 %    RDW 15.0 11.5 - 15.0 fL    Platelets 159 (H) 189 - 450 E9/L    MPV 8.9 7.0 - 12.0 fL    Neutrophils % 67.4 43.0 - 80.0 %    Immature Granulocytes % 0.2 0.0 - 5.0 %    Lymphocytes % 22.0 20.0 - 42.0 %    Monocytes % 9.4 2.0 - 12.0 %    Eosinophils % 0.5 0.0 - 6.0 %    Basophils % 0.5 0.0 - 2.0 %    Neutrophils Absolute 4.26 1.80 - 7.30 E9/L    Immature Granulocytes # 0.01 E9/L    Lymphocytes Absolute 1.39 (L) 1.50 - 4.00 E9/L    Monocytes Absolute 0.59 0.10 - 0.95 E9/L    Eosinophils Absolute 0.03 (L) 0.05 - 0.50 E9/L    Basophils Absolute 0.03 0.00 - 0.20 E9/L   Comprehensive Metabolic Panel   Result Value Ref Range    Sodium 136 132 - 146 mmol/L    Potassium 3.6 3.5 - 5.0 mmol/L    Chloride 103 98 - 107 mmol/L    CO2 20 (L) 22 - 29 mmol/L    Anion Gap 13 7 - 16 mmol/L    Glucose 92 74 - 99 mg/dL    BUN 8 6 - 23 mg/dL    Creatinine 0.7 0.5 - 1.0 mg/dL    Est, Glom Filt Rate >60 >=60 mL/min/1.73    Calcium 9.1 8.6 - 10.2 mg/dL    Total Protein 7.1 6.4 - 8.3 g/dL    Albumin 3.5 3.5 - 5.2 g/dL    Total Bilirubin 0.4 0.0 - 1.2 mg/dL    Alkaline Phosphatase 317 (H) 35 - 104 U/L    ALT 31 0 - 32 U/L    AST 66 (H) 0 - 31 U/L   Lactic Acid   Result Value Ref Range    Lactic Acid 1.3 0.5 - 2.2 mmol/L   Lipase   Result Value Ref Range    Lipase 46 13 - 60 U/L   Urinalysis with Microscopic   Result Value Ref Range    Color, UA DARK YELLOW (A) Straw/Yellow    Clarity, UA Clear Clear    Glucose, Ur Negative Negative mg/dL    Bilirubin Urine SMALL (A) Negative    Ketones, Urine Negative Negative mg/dL    Specific Gravity, UA >=1.030 1.005 - 1.030    Blood, Urine TRACE-INTACT Negative    pH, UA 6.0 5.0 - 9.0    Protein, UA 30 (A) Negative mg/dL    Urobilinogen, Urine 0.2 <2.0 E.U./dL    Nitrite, Urine Negative Negative    Leukocyte Esterase, Urine Negative Negative    Hyaline Casts, UA 0-2 0 - 2 /LPF    Coarse Casts, UA 0-2 0 - 2 /LPF    Mucus, UA Present (A) None Seen /LPF    WBC, UA 0-1 0 - 5 /HPF    RBC, UA 2-5 0 - 2 /HPF    Bacteria, UA RARE (A) None Seen /HPF   Troponin   Result Value Ref Range    Troponin, High Sensitivity 9 0 - 9 ng/L   EKG 12 Lead   Result Value Ref Range    Ventricular Rate 90 BPM    Atrial Rate 90 BPM    P-R Interval 152 ms    QRS Duration 66 ms    Q-T Interval 364 ms    QTc Calculation (Bazett) 445 ms    P Axis 56 degrees    R Axis 31 degrees    T Axis 25 degrees       Radiology:  CT CHEST W CONTRAST   Final Result   1. New diffuse osseous metastases. 2. Stable lobular nodule located in left lower lobe measures up to 14 mm. 3. No new lung nodule or mass. 4. No pneumonia or pleural effusion. 5. Stable generalized enlarged appearance of the thyroid gland. CT ABDOMEN PELVIS W IV CONTRAST Additional Contrast? None   Final Result   1. No acute abnormality is seen in the abdomen or the pelvis. 2. Extensive OSTEOBLASTIC METASTATIC DISEASE. No fracture. 3. Subtle hypodense lesions in the liver are nonspecific. Given the   appearance of the bones, METASTATIC DISEASE is favored. Further evaluation   with pre and post contrast enhanced MRI recommended. 4. No lymphadenopathy. 5. Bilateral nephrolithiasis. No hydronephrosis.             ------------------------- NURSING NOTES AND VITALS REVIEWED ---------------------------  Date / Time Roomed:  10/18/2022  2:49 PM  ED Bed Assignment:  12/12    The nursing notes within the ED encounter and vital signs as below have been reviewed.    /70   Pulse (!) 112   Temp 99.4 °F (37.4 °C)   Resp 18   LMP 01/10/2013   SpO2 98%   Oxygen Saturation Interpretation: Normal      ------------------------------------------ PROGRESS NOTES ------------------------------------------  9:11 PM EDT  I have spoken with the patient and discussed todays results, in addition to providing specific details for the plan of care and counseling regarding the diagnosis and prognosis. Their questions are answered at this time and they are agreeable with the plan. I discussed at length with them reasons for immediate return here for re evaluation. They will followup with their  oncologist  and primary care physician by calling their office tomorrow. --------------------------------- ADDITIONAL PROVIDER NOTES ---------------------------------  At this time the patient is without objective evidence of an acute process requiring hospitalization or inpatient management. They have remained hemodynamically stable throughout their entire ED visit and are stable for discharge with outpatient follow-up. The plan has been discussed in detail and they are aware of the specific conditions for emergent return, as well as the importance of follow-up. Discharge Medication List as of 10/18/2022  9:09 PM        START taking these medications    Details   pantoprazole (PROTONIX) 40 MG tablet Take 1 tablet by mouth daily (with breakfast), Disp-30 tablet, R-3Print      famotidine (PEPCID) 20 MG tablet Take 1 tablet by mouth 2 times daily, Disp-60 tablet, R-0Print      sucralfate (CARAFATE) 1 GM tablet Take 1 tablet by mouth 4 times daily, Disp-120 tablet, R-0Print             Diagnosis:  1. Generalized abdominal pain    2. Osteoblastic sarcoma ([address]    3. Malignant neoplasm metastatic to bone (HCC)    4. Lung mass        Disposition:  Patient's disposition: Discharge to home  Patient's condition is stable. Edith Downs DO  Resident  10/19/22 [id number]       ATTENDING PROVIDER ATTESTATION:     I,  Dr. Chandrika Rodriguez am the primary physician of record for this patient. Karina Winn presented to the emergency department for evaluation of Abdominal Pain (RUQ, LUQ, for the past three weeks, right flank pain. Sent in by PCP for CT scan)   and was initially evaluated by the Medical Resident.  See Original ED Note for H&P and ED course above. I have reviewed and discussed the case, including pertinent history (medical, surgical, family and social) and exam findings with the Medical Resident assigned to [employer]. I have personally performed and/or participated in the history, exam, medical decision making, and procedures and agree with all pertinent clinical information. EKG: This EKG is signed and interpreted by me. Normal sinus rhythm rate of 90, no ST segment elevation or depression, WA interval 152 MS, QRS 66 MS,  MS     I have reviewed my findings and recommendations with the assigned Medical Resident, [employer] and members of family present at the time of disposition. My findings/plan: The primary encounter diagnosis was Generalized abdominal pain. Diagnoses of Osteoblastic sarcoma ([facility] [address] Malignant neoplasm metastatic to bone [facility]), and Lung mass were also pertinent to this visit.   Discharge Medication List as of 10/18/2022  9:09 PM        START taking these medications    Details   pantoprazole (PROTONIX) 40 MG tablet Take 1 tablet by mouth daily (with breakfast), Disp-30 tablet, R-3Print      famotidine (PEPCID) 20 MG tablet Take 1 tablet by mouth 2 times daily, Disp-60 tablet, R-0Print      sucralfate (CARAFATE) 1 GM tablet Take 1 tablet by mouth 4 times daily, Disp-120 tablet, R-0Print           [facility], [address], DO  10/19/22 1921

## 2022-10-18 NOTE — ED NOTES
Department of Emergency Medicine  FIRST PROVIDER TRIAGE NOTE             Independent MLP           10/18/22  1:57 PM EDT    Date of Encounter: 10/18/22   MRN: [id number]      HPI: Junior Orta is a 63 y.o. female who presents to the ED for Abdominal Pain (RUQ, LUQ, for the past three weeks, right flank pain. Sent in by PCP for CT scan)     Patient is a 82-year-old presenting with epigastric rating across to her entire abdomen into her right posterior flank. Patient was seen by the family doctor and sent in for evaluation. Patient is on Linzess. Patient denies any history of kidney stones or bowel obstructions. Patient denies any falls or trauma. Patient does not have a gallbladder. Patient had gastric bypass 2 years ago    ROS: Negative for cp, sob, fever, cough, or vomiting. PE: Gen Appearance/Constitutional: alert  HEENT: NC/NT. PERRLA,  Airway patent. Neck: supple     Initial Plan of Care: All treatment areas with department are currently occupied. Plan to order/Initiate the following while awaiting opening in ED: labs, EKG, and imaging studies.   Initiate Treatment-Testing, Proceed toTreatment Area When Bed Available for ED Attending/MLP to Continue Care    Electronically signed by Hanna Calvin PA-C   DD: 10/18/22       Hanna Calvin PA-C  10/18/22 [id number]

## 2022-10-21 LAB — URINE CULTURE, ROUTINE: NORMAL

## 2022-11-04 ENCOUNTER — HOSPITAL ENCOUNTER (OUTPATIENT)
Age: 62
Discharge: HOME | End: 2022-11-06

## 2022-11-04 PROCEDURE — 87081 CULTURE SCREEN ONLY: CPT

## 2022-11-04 PROCEDURE — 88305 TISSUE EXAM BY PATHOLOGIST: CPT

## 2022-11-05 LAB — CLOTEST: NORMAL

## 2022-11-09 ENCOUNTER — HOSPITAL ENCOUNTER (OUTPATIENT)
Dept: PET IMAGING | Age: 62
Discharge: HOME | End: 2022-11-11
Payer: COMMERCIAL

## 2022-11-09 DIAGNOSIS — K63.5 POLYP OF COLON, UNSPECIFIED PART OF COLON, UNSPECIFIED TYPE: ICD-10-CM

## 2022-11-09 DIAGNOSIS — N60.29 FIBROADENOSIS OF BREAST, UNSPECIFIED LATERALITY: ICD-10-CM

## 2022-11-09 DIAGNOSIS — C79.51 SECONDARY MALIGNANT NEOPLASM OF BONE (HCC): ICD-10-CM

## 2022-11-09 DIAGNOSIS — D64.9 ANEMIA, UNSPECIFIED TYPE: ICD-10-CM

## 2022-11-09 LAB — METER GLUCOSE: 88 MG/DL (ref 74–99)

## 2022-11-09 PROCEDURE — 3430000000 HC RX DIAGNOSTIC RADIOPHARMACEUTICAL: Performed by: RADIOLOGY

## 2022-11-09 PROCEDURE — 78815 PET IMAGE W/CT SKULL-THIGH: CPT

## 2022-11-09 PROCEDURE — A9552 F18 FDG: HCPCS | Performed by: RADIOLOGY

## 2022-11-09 PROCEDURE — 82962 GLUCOSE BLOOD TEST: CPT

## 2022-11-09 RX ORDER — FLUDEOXYGLUCOSE F 18 200 MCI/ML
15 INJECTION, SOLUTION INTRAVENOUS
Status: COMPLETED | OUTPATIENT
Start: 2022-11-09 | End: 2022-11-09

## 2022-11-09 RX ADMIN — FLUDEOXYGLUCOSE F 18 15 MILLICURIE: 200 INJECTION, SOLUTION INTRAVENOUS at 12:30

## 2022-11-10 ENCOUNTER — HOSPITAL ENCOUNTER (OUTPATIENT)
Age: 62
Discharge: HOME | End: 2022-11-10
Payer: COMMERCIAL

## 2022-11-10 ENCOUNTER — HOSPITAL ENCOUNTER (OUTPATIENT)
Dept: CT IMAGING | Age: 62
Discharge: HOME | End: 2022-11-12
Payer: COMMERCIAL

## 2022-11-10 VITALS
OXYGEN SATURATION: 97 % | RESPIRATION RATE: 16 BRPM | SYSTOLIC BLOOD PRESSURE: 102 MMHG | DIASTOLIC BLOOD PRESSURE: 57 MMHG | HEART RATE: 83 BPM

## 2022-11-10 DIAGNOSIS — M89.9 BONE LESION: ICD-10-CM

## 2022-11-10 LAB
BASOPHILS ABSOLUTE: 0.03 E9/L (ref 0–0.2)
BASOPHILS RELATIVE PERCENT: 0.7 % (ref 0–2)
EOSINOPHILS ABSOLUTE: 0.11 E9/L (ref 0.05–0.5)
EOSINOPHILS RELATIVE PERCENT: 2.5 % (ref 0–6)
HCT VFR BLD CALC: 36.6 % (ref 34–48)
HEMOGLOBIN: 11.7 G/DL (ref 11.5–15.5)
IMMATURE GRANULOCYTES #: 0.01 E9/L
IMMATURE GRANULOCYTES %: 0.2 % (ref 0–5)
INR BLD: 1.4
LYMPHOCYTES ABSOLUTE: 1.16 E9/L (ref 1.5–4)
LYMPHOCYTES RELATIVE PERCENT: 26.1 % (ref 20–42)
MCH RBC QN AUTO: 22.8 PG (ref 26–35)
MCHC RBC AUTO-ENTMCNC: 32 % (ref 32–34.5)
MCV RBC AUTO: 71.2 FL (ref 80–99.9)
MONOCYTES ABSOLUTE: 0.44 E9/L (ref 0.1–0.95)
MONOCYTES RELATIVE PERCENT: 9.9 % (ref 2–12)
NEUTROPHILS ABSOLUTE: 2.7 E9/L (ref 1.8–7.3)
NEUTROPHILS RELATIVE PERCENT: 60.6 % (ref 43–80)
PDW BLD-RTO: 14.8 FL (ref 11.5–15)
PLATELET # BLD: 568 E9/L (ref 130–450)
PMV BLD AUTO: 9.1 FL (ref 7–12)
PROTHROMBIN TIME: 16.1 SEC (ref 9.3–12.4)
RBC # BLD: 5.14 E12/L (ref 3.5–5.5)
WBC # BLD: 4.5 E9/L (ref 4.5–11.5)

## 2022-11-10 PROCEDURE — 85610 PROTHROMBIN TIME: CPT

## 2022-11-10 PROCEDURE — 88342 IMHCHEM/IMCYTCHM 1ST ANTB: CPT

## 2022-11-10 PROCEDURE — 6360000002 HC RX W HCPCS: Performed by: RADIOLOGY

## 2022-11-10 PROCEDURE — 2500000003 HC RX 250 WO HCPCS: Performed by: RADIOLOGY

## 2022-11-10 PROCEDURE — 88311 DECALCIFY TISSUE: CPT

## 2022-11-10 PROCEDURE — 85025 COMPLETE CBC W/AUTO DIFF WBC: CPT

## 2022-11-10 PROCEDURE — 36415 COLL VENOUS BLD VENIPUNCTURE: CPT

## 2022-11-10 PROCEDURE — 88341 IMHCHEM/IMCYTCHM EA ADD ANTB: CPT

## 2022-11-10 PROCEDURE — 88307 TISSUE EXAM BY PATHOLOGIST: CPT

## 2022-11-10 PROCEDURE — C1713 ANCHOR/SCREW BN/BN,TIS/BN: HCPCS

## 2022-11-10 PROCEDURE — 77002 NEEDLE LOCALIZATION BY XRAY: CPT

## 2022-11-10 RX ORDER — FENTANYL CITRATE 50 UG/ML
INJECTION, SOLUTION INTRAMUSCULAR; INTRAVENOUS
Status: COMPLETED | OUTPATIENT
Start: 2022-11-10 | End: 2022-11-10

## 2022-11-10 RX ORDER — MIDAZOLAM HYDROCHLORIDE 2 MG/2ML
INJECTION, SOLUTION INTRAMUSCULAR; INTRAVENOUS
Status: COMPLETED | OUTPATIENT
Start: 2022-11-10 | End: 2022-11-10

## 2022-11-10 RX ORDER — LIDOCAINE HYDROCHLORIDE 10 MG/ML
INJECTION, SOLUTION INFILTRATION; PERINEURAL
Status: COMPLETED | OUTPATIENT
Start: 2022-11-10 | End: 2022-11-10

## 2022-11-10 RX ADMIN — LIDOCAINE HYDROCHLORIDE 10 ML: 10 INJECTION, SOLUTION INFILTRATION; PERINEURAL at 08:48

## 2022-11-10 RX ADMIN — MIDAZOLAM HYDROCHLORIDE 1 MG: 1 INJECTION, SOLUTION INTRAMUSCULAR; INTRAVENOUS at 08:31

## 2022-11-10 RX ADMIN — FENTANYL CITRATE 25 MCG: 50 INJECTION, SOLUTION INTRAMUSCULAR; INTRAVENOUS at 08:31

## 2022-11-10 RX ADMIN — FENTANYL CITRATE 25 MCG: 50 INJECTION, SOLUTION INTRAMUSCULAR; INTRAVENOUS at 08:52

## 2022-11-10 ASSESSMENT — PAIN SCALES - GENERAL
PAINLEVEL_OUTOF10: 0

## 2022-11-10 ASSESSMENT — PAIN - FUNCTIONAL ASSESSMENT
PAIN_FUNCTIONAL_ASSESSMENT: ACTIVITIES ARE NOT PREVENTED
PAIN_FUNCTIONAL_ASSESSMENT: 0-10
PAIN_FUNCTIONAL_ASSESSMENT: ACTIVITIES ARE NOT PREVENTED
PAIN_FUNCTIONAL_ASSESSMENT: ACTIVITIES ARE NOT PREVENTED

## 2022-11-10 ASSESSMENT — PAIN DESCRIPTION - DESCRIPTORS: DESCRIPTORS: ACHING

## 2022-11-10 NOTE — OR NURSING
Prior to procedure,  Dr. Geoffery Sacks in to speak with pt and her , Diego Noonan, to explain procedure, including risks. Patient positioned prone, feet first on Cat Scan table with O2,safety and monitoring devices attached. Patient scanned and images reviewed by Dr. Geoffery Sacks.  Patient prepped and draped. With the guidance of Cat Scan, needle inserted and core biopsy taken by Dr. Geoffery Sacks. Patient re-scanned and images reviewed by Dr. Geoffery Sacks.    Puncture site cleansed and dry dressing applied. Procedure completed. Report called to Rosalva Barrow RN, PACU. Patient transported to recovery. Biopsy sample taken to laboratory.

## 2022-11-10 NOTE — PRE SEDATION
Sedation Pre-Procedure Note    Patient Name: Judith Rios   YOB: 1960  Room/Bed: Room/bed info not found  Medical Record Number: [id number]  Date: 11/10/2022   Time: 11:52 AM       Indication:  Bone mets    Consent: I have discussed with the patient and/or the patient representative the indication, alternatives, and the possible risks and/or complications of the planned procedure and the anesthesia methods. The patient and/or patient representative appear to understand and agree to proceed. Vital Signs:   Vitals:    11/10/22 1100   BP: (!) 102/57   Pulse: 83   Resp: 16   SpO2: 97%       Past Medical History:   has a past medical history of Hyperlipidemia, Hypertension, and Prediabetes. Past Surgical History:   has a past surgical history that includes Upper gastrointestinal endoscopy (2018); Colonoscopy (2018); and Gastric bypass surgery (2019). Medications:   Scheduled Meds:   Continuous Infusions:   PRN Meds:   Home Meds:   Prior to Admission medications    Medication Sig Start Date End Date Taking?  Authorizing Provider   pantoprazole (PROTONIX) 40 MG tablet Take 1 tablet by mouth daily (with breakfast) 10/18/22 11/17/22  Elzbieta Best, DO   famotidine (PEPCID) 20 MG tablet Take 1 tablet by mouth 2 times daily 10/18/22 11/17/22  Elzbieta Best, DO   sucralfate (CARAFATE) 1 GM tablet Take 1 tablet by mouth 4 times daily 10/18/22 11/17/22  Elzbieta Best, DO   clotrimazole-betamethasone (LOTRISONE) 1-0.05 % cream clotrimazole-betamethasone 1 %-0.05 % topical cream    Historical Provider, MD   ammonium lactate (LAC-HYDRIN) 12 % lotion ammonium lactate 12 % lotion    Historical Provider, MD   benzonatate (TESSALON) 100 MG capsule benzonatate 100 mg capsule    Historical Provider, MD   albuterol sulfate  (90 Base) MCG/ACT inhaler albuterol sulfate HFA 90 mcg/actuation aerosol inhaler    Historical Provider, MD   Lancets (ONETOUCH DELICA PLUS [id number]) MISC use twice a day 4/13/21   Historical Provider, MD   amLODIPine (NORVASC) 5 MG tablet amlodipine 5 mg tablet  Patient not taking: Reported on 11/10/2022    Historical Provider, MD   blood glucose test strips (EXACTECH TEST) strip FreeStyle Lite Strips    Historical Provider, MD     Coumadin Use Last 7 Days:  no  Antiplatelet drug therapy use last 7 days: no  Other anticoagulant use last 7 days: no  Additional Medication Information:  n/a      Pre-Sedation Documentation and Exam:   I have reviewed the patient's history and review of systems.     Mallampati Airway Assessment:  Mallampati Class III - (soft palate & base of uvula are visible)    Prior History of Anesthesia Complications:   none    ASA Classification:  Class 2 - A normal healthy patient with mild systemic disease    Sedation/ Anesthesia Plan:   intravenous sedation    Medications Planned:   midazolam (Versed) intravenously and fentanyl intravenously    Patient is an appropriate candidate for plan of sedation: yes    Electronically signed by Calista Vo MD on 11/10/2022 at 11:52 AM

## 2022-11-15 ENCOUNTER — HOSPITAL ENCOUNTER (EMERGENCY)
Age: 62
Discharge: HOME | End: 2022-11-15
Attending: EMERGENCY MEDICINE
Payer: COMMERCIAL

## 2022-11-15 VITALS
WEIGHT: 167 LBS | HEART RATE: 102 BPM | SYSTOLIC BLOOD PRESSURE: 100 MMHG | TEMPERATURE: 99.1 F | DIASTOLIC BLOOD PRESSURE: 50 MMHG | RESPIRATION RATE: 20 BRPM | HEIGHT: 60 IN | OXYGEN SATURATION: 100 % | BODY MASS INDEX: 32.79 KG/M2

## 2022-11-15 DIAGNOSIS — B37.0 THRUSH, ORAL: Primary | ICD-10-CM

## 2022-11-15 LAB
INFLUENZA A: NOT DETECTED
INFLUENZA B: NOT DETECTED
METER GLUCOSE: 79 MG/DL (ref 74–99)
SARS-COV-2 RNA, RT PCR: NOT DETECTED
STREP GRP A PCR: NEGATIVE

## 2022-11-15 PROCEDURE — 82962 GLUCOSE BLOOD TEST: CPT

## 2022-11-15 PROCEDURE — 87636 SARSCOV2 & INF A&B AMP PRB: CPT

## 2022-11-15 PROCEDURE — 87880 STREP A ASSAY W/OPTIC: CPT

## 2022-11-15 PROCEDURE — 99283 EMERGENCY DEPT VISIT LOW MDM: CPT

## 2022-11-15 NOTE — ED PROVIDER NOTES
HPI:  11/15/22,   Time: 2:46 PM SHELLY Winn is a 63 y.o. female presenting to the ED for tongue and throat pain, beginning 12 hours ago. The complaint has been persistent, mild in severity, and worsened by nothing. Patient says that she woke up in the middle of the night with an irritated feeling in her mouth. She says that she scraped white material off of her tongue. Now its even more irritated and painful. She states that she is scraped her tongue a couple of times today. Patient says that she is currently being worked up for cancer. 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 Patient says that she is following up with oncology. She says that she has not started any chemo or radiation at this time. She is not on any steroids. No breathing treatments. Patient denies a history of diabetes. She denies any fever, chills, chest pain, shortness of breath, cough. ROS:   Pertinent positives and negatives are stated within HPI, all other systems reviewed and are negative.  --------------------------------------------- PAST HISTORY ---------------------------------------------  Past Medical History:  has a past medical history of Cancer ([facility] [address] Hyperlipidemia, Hypertension, and Prediabetes. Past Surgical History:  has a past surgical history that includes Upper gastrointestinal endoscopy (01/09/2018); Colonoscopy (01/09/2018); Gastric bypass surgery (09/2019); and CT BIOPSY SUPERFICIAL BONE PERCUTANEOUS (11/10/2022). Social History:  reports that she quit smoking about 32 years ago. Her smoking use included cigarettes. She started smoking about 44 years ago. She has never used smokeless tobacco. She reports that she does not drink alcohol and does not use drugs.     Family History: family history includes Heart Attack in her maternal grandmother and paternal grandmother; Prostate Cancer in her paternal grandfather; Stroke in her maternal grandmother and paternal grandmother. The patients home medications have been reviewed. Allergies: Patient has no known allergies. -------------------------------------------------- RESULTS -------------------------------------------------  All laboratory and radiology results have been personally reviewed by myself   LABS:  Results for orders placed or performed during the hospital encounter of 11/15/22   COVID-19 & Influenza Combo    Specimen: Nasopharyngeal Swab   Result Value Ref Range    SARS-CoV-2 RNA, RT PCR NOT DETECTED NOT DETECTED    INFLUENZA A NOT DETECTED NOT DETECTED    INFLUENZA B NOT DETECTED NOT DETECTED   Strep Screen Group A Throat    Specimen: Throat   Result Value Ref Range    Strep Grp A PCR Negative Negative   POCT Glucose   Result Value Ref Range    Meter Glucose 79 74 - 99 mg/dL       RADIOLOGY:  Interpreted by Radiologist.  No orders to display       ------------------------- NURSING NOTES AND VITALS REVIEWED ---------------------------   The nursing notes within the ED encounter and vital signs as below have been reviewed. BP (!) 100/50   Pulse (!) 102   Temp 99.1 °F (37.3 °C) (Oral)   Resp 20   Ht 5' (1.524 m)   Wt 167 lb (75.8 kg)   LMP 01/10/2013   SpO2 100%   BMI 32.61 kg/m²   Oxygen Saturation Interpretation: Normal      ---------------------------------------------------PHYSICAL EXAM--------------------------------------      Constitutional/General: Alert and oriented x3, well appearing, non toxic in NAD  Head: NC/AT  Eyes: PERRL, EOMI  Mouth: Oropharynx clear, handling secretions, no trismus. Tongue is erythematous. No white plaques appreciated. No ulcerations seen. No uvula deviation. No posterior pharynx buldging. No tonsillar exudate. Neck: Supple, full ROM, no meningeal signs  Pulmonary: Lungs clear to auscultation bilaterally, no wheezes, rales, or rhonchi.  Not in respiratory distress  Cardiovascular:  Regular rate and rhythm, no murmurs, gallops, or rubs. 2+ distal pulses  Abdomen: Soft, non tender, non distended,   Extremities: Moves all extremities x 4. Warm and well perfused  Skin: warm and dry without rash  Neurologic: GCS 15,  Psych: Normal Affect      ------------------------------ ED COURSE/MEDICAL DECISION MAKING----------------------  Medications - No data to display      Medical Decision Making:    COVID, influenza, rapid strep are all negative. As patient says that she was scraping white plaques of her tongue I will start her on nystatin for potential thrush. I told her to follow-up with her primary care physician for reevaluation this week, to use the medication prescribed as directed, and to return for worsening symptoms. She is agreeable with the plan of care. Counseling: The emergency provider has spoken with the patient and discussed todays results, in addition to providing specific details for the plan of care and counseling regarding the diagnosis and prognosis. Questions are answered at this time and they are agreeable with the plan.      --------------------------------- IMPRESSION AND DISPOSITION ---------------------------------    IMPRESSION  1.  Thrush, oral        DISPOSITION  Disposition: Discharge to home  Patient condition is stable                  Margarita Mendoza MD  11/15/22 [id number]

## 2022-12-03 ENCOUNTER — APPOINTMENT (OUTPATIENT)
Dept: CT IMAGING | Age: 62
End: 2022-12-03
Payer: COMMERCIAL

## 2022-12-03 ENCOUNTER — HOSPITAL ENCOUNTER (EMERGENCY)
Age: 62
Discharge: HOME | End: 2022-12-03
Attending: EMERGENCY MEDICINE
Payer: COMMERCIAL

## 2022-12-03 VITALS
OXYGEN SATURATION: 96 % | DIASTOLIC BLOOD PRESSURE: 70 MMHG | TEMPERATURE: 98.3 F | SYSTOLIC BLOOD PRESSURE: 112 MMHG | HEART RATE: 96 BPM | WEIGHT: 167 LBS | RESPIRATION RATE: 15 BRPM | HEIGHT: 60 IN | BODY MASS INDEX: 32.79 KG/M2

## 2022-12-03 DIAGNOSIS — C79.9 METASTATIC MALIGNANT NEOPLASM, UNSPECIFIED SITE (HCC): ICD-10-CM

## 2022-12-03 DIAGNOSIS — R10.84 GENERALIZED ABDOMINAL PAIN: Primary | ICD-10-CM

## 2022-12-03 DIAGNOSIS — M54.50 ACUTE LEFT-SIDED LOW BACK PAIN WITHOUT SCIATICA: ICD-10-CM

## 2022-12-03 DIAGNOSIS — R93.5 ABNORMAL CT OF THE ABDOMEN: ICD-10-CM

## 2022-12-03 LAB
ALBUMIN SERPL-MCNC: 2.7 G/DL (ref 3.5–5.2)
ALP BLD-CCNC: 346 U/L (ref 35–104)
ALT SERPL-CCNC: 33 U/L (ref 0–32)
ANION GAP SERPL CALCULATED.3IONS-SCNC: 9 MMOL/L (ref 7–16)
AST SERPL-CCNC: 76 U/L (ref 0–31)
BASOPHILS ABSOLUTE: 0.04 E9/L (ref 0–0.2)
BASOPHILS RELATIVE PERCENT: 0.6 % (ref 0–2)
BILIRUB SERPL-MCNC: 0.3 MG/DL (ref 0–1.2)
BILIRUBIN DIRECT: <0.2 MG/DL (ref 0–0.3)
BILIRUBIN, INDIRECT: ABNORMAL MG/DL (ref 0–1)
BUN BLDV-MCNC: 8 MG/DL (ref 6–23)
CALCIUM SERPL-MCNC: 7.3 MG/DL (ref 8.6–10.2)
CHLORIDE BLD-SCNC: 105 MMOL/L (ref 98–107)
CO2: 23 MMOL/L (ref 22–29)
CREAT SERPL-MCNC: 0.5 MG/DL (ref 0.5–1)
EOSINOPHILS ABSOLUTE: 0.03 E9/L (ref 0.05–0.5)
EOSINOPHILS RELATIVE PERCENT: 0.4 % (ref 0–6)
GFR SERPL CREATININE-BSD FRML MDRD: >60 ML/MIN/1.73
GLUCOSE BLD-MCNC: 85 MG/DL (ref 74–99)
HCT VFR BLD CALC: 32.6 % (ref 34–48)
HEMOGLOBIN: 10.7 G/DL (ref 11.5–15.5)
IMMATURE GRANULOCYTES #: 0.02 E9/L
IMMATURE GRANULOCYTES %: 0.3 % (ref 0–5)
INR BLD: 1.4
LACTIC ACID: 1.1 MMOL/L (ref 0.5–2.2)
LIPASE: 30 U/L (ref 13–60)
LYMPHOCYTES ABSOLUTE: 1.29 E9/L (ref 1.5–4)
LYMPHOCYTES RELATIVE PERCENT: 18.8 % (ref 20–42)
MAGNESIUM: 1.7 MG/DL (ref 1.6–2.6)
MCH RBC QN AUTO: 23.7 PG (ref 26–35)
MCHC RBC AUTO-ENTMCNC: 32.8 % (ref 32–34.5)
MCV RBC AUTO: 72.3 FL (ref 80–99.9)
MONOCYTES ABSOLUTE: 0.7 E9/L (ref 0.1–0.95)
MONOCYTES RELATIVE PERCENT: 10.2 % (ref 2–12)
NEUTROPHILS ABSOLUTE: 4.79 E9/L (ref 1.8–7.3)
NEUTROPHILS RELATIVE PERCENT: 69.7 % (ref 43–80)
PDW BLD-RTO: 17.2 FL (ref 11.5–15)
PLATELET # BLD: 426 E9/L (ref 130–450)
PMV BLD AUTO: 9.5 FL (ref 7–12)
POTASSIUM SERPL-SCNC: 3.9 MMOL/L (ref 3.5–5)
PROTHROMBIN TIME: 15.4 SEC (ref 9.3–12.4)
RBC # BLD: 4.51 E12/L (ref 3.5–5.5)
SARS-COV-2, NAAT: NOT DETECTED
SODIUM BLD-SCNC: 137 MMOL/L (ref 132–146)
TOTAL PROTEIN: 6.3 G/DL (ref 6.4–8.3)
TROPONIN, HIGH SENSITIVITY: 8 NG/L (ref 0–9)
TSH SERPL DL<=0.05 MIU/L-ACNC: 1.21 UIU/ML (ref 0.27–4.2)
WBC # BLD: 6.9 E9/L (ref 4.5–11.5)

## 2022-12-03 PROCEDURE — 74177 CT ABD & PELVIS W/CONTRAST: CPT

## 2022-12-03 PROCEDURE — 87635 SARS-COV-2 COVID-19 AMP PRB: CPT

## 2022-12-03 PROCEDURE — 84443 ASSAY THYROID STIM HORMONE: CPT

## 2022-12-03 PROCEDURE — 93005 ELECTROCARDIOGRAM TRACING: CPT | Performed by: EMERGENCY MEDICINE

## 2022-12-03 PROCEDURE — 83690 ASSAY OF LIPASE: CPT

## 2022-12-03 PROCEDURE — 2580000003 HC RX 258: Performed by: EMERGENCY MEDICINE

## 2022-12-03 PROCEDURE — 71275 CT ANGIOGRAPHY CHEST: CPT

## 2022-12-03 PROCEDURE — 99285 EMERGENCY DEPT VISIT HI MDM: CPT

## 2022-12-03 PROCEDURE — 83605 ASSAY OF LACTIC ACID: CPT

## 2022-12-03 PROCEDURE — 84484 ASSAY OF TROPONIN QUANT: CPT

## 2022-12-03 PROCEDURE — 85025 COMPLETE CBC W/AUTO DIFF WBC: CPT

## 2022-12-03 PROCEDURE — 6360000002 HC RX W HCPCS: Performed by: EMERGENCY MEDICINE

## 2022-12-03 PROCEDURE — 80076 HEPATIC FUNCTION PANEL: CPT

## 2022-12-03 PROCEDURE — 80048 BASIC METABOLIC PNL TOTAL CA: CPT

## 2022-12-03 PROCEDURE — 85610 PROTHROMBIN TIME: CPT

## 2022-12-03 PROCEDURE — 6360000004 HC RX CONTRAST MEDICATION: Performed by: RADIOLOGY

## 2022-12-03 PROCEDURE — 96374 THER/PROPH/DIAG INJ IV PUSH: CPT

## 2022-12-03 PROCEDURE — 83735 ASSAY OF MAGNESIUM: CPT

## 2022-12-03 RX ORDER — LIDOCAINE 50 MG/G
1 PATCH TOPICAL DAILY
Qty: 10 PATCH | Refills: 0 | Status: SHIPPED | OUTPATIENT
Start: 2022-12-03 | End: 2022-12-13

## 2022-12-03 RX ORDER — PREDNISONE 20 MG/1
20 TABLET ORAL 2 TIMES DAILY
Qty: 10 TABLET | Refills: 0 | Status: SHIPPED | OUTPATIENT
Start: 2022-12-03 | End: 2022-12-08

## 2022-12-03 RX ORDER — 0.9 % SODIUM CHLORIDE 0.9 %
1000 INTRAVENOUS SOLUTION INTRAVENOUS ONCE
Status: COMPLETED | OUTPATIENT
Start: 2022-12-03 | End: 2022-12-03

## 2022-12-03 RX ORDER — MORPHINE SULFATE 4 MG/ML
4 INJECTION, SOLUTION INTRAMUSCULAR; INTRAVENOUS ONCE
Status: COMPLETED | OUTPATIENT
Start: 2022-12-03 | End: 2022-12-03

## 2022-12-03 RX ORDER — ONDANSETRON 4 MG/1
4 TABLET, ORALLY DISINTEGRATING ORAL 3 TIMES DAILY PRN
Qty: 21 TABLET | Refills: 0 | Status: SHIPPED | OUTPATIENT
Start: 2022-12-03

## 2022-12-03 RX ADMIN — IOPAMIDOL 75 ML: 755 INJECTION, SOLUTION INTRAVENOUS at 21:48

## 2022-12-03 RX ADMIN — MORPHINE SULFATE 4 MG: 4 INJECTION, SOLUTION INTRAMUSCULAR; INTRAVENOUS at 20:13

## 2022-12-03 RX ADMIN — SODIUM CHLORIDE 1000 ML: 9 INJECTION, SOLUTION INTRAVENOUS at 20:11

## 2022-12-03 ASSESSMENT — PAIN DESCRIPTION - LOCATION: LOCATION: ABDOMEN;BACK

## 2022-12-03 ASSESSMENT — PAIN - FUNCTIONAL ASSESSMENT: PAIN_FUNCTIONAL_ASSESSMENT: 0-10

## 2022-12-03 ASSESSMENT — ENCOUNTER SYMPTOMS
ABDOMINAL PAIN: 1
SORE THROAT: 0
DIARRHEA: 1
SHORTNESS OF BREATH: 0
COUGH: 0
BACK PAIN: 1
PHOTOPHOBIA: 0
SINUS PAIN: 0

## 2022-12-03 ASSESSMENT — PAIN SCALES - GENERAL: PAINLEVEL_OUTOF10: 9

## 2022-12-03 ASSESSMENT — PAIN DESCRIPTION - DESCRIPTORS: DESCRIPTORS: THROBBING;SORE

## 2022-12-04 LAB
EKG ATRIAL RATE: 97 BPM
EKG P AXIS: 48 DEGREES
EKG P-R INTERVAL: 136 MS
EKG Q-T INTERVAL: 370 MS
EKG QRS DURATION: 58 MS
EKG QTC CALCULATION (BAZETT): 469 MS
EKG R AXIS: 28 DEGREES
EKG T AXIS: 37 DEGREES
EKG VENTRICULAR RATE: 97 BPM

## 2022-12-04 PROCEDURE — 93010 ELECTROCARDIOGRAM REPORT: CPT | Performed by: INTERNAL MEDICINE

## 2022-12-04 NOTE — DISCHARGE INSTRUCTIONS
CALL ONCOLOGIST Monday TO BE SEEN AS SOON AS POSSIBLE    CALL PAIN MANAGEMENT TO MAKE AN APPOINTMENT TO BE SEEN THIS WEEK    CONTINUE PERCOCET FOR Pain    HAVE YOUR DOCTORS FOLLOWUP ALL RESULTS    CT ABDOMEN PELVIS W IV CONTRAST Additional Contrast? None   Final Result   Extensive sclerotic osseous changes consistent with metastatic disease. Innumerable hepatic masses consistent with metastatic disease. Cholecystectomy. Multiple punctate nonobstructing right renal calculi. CTA PULMONARY W CONTRAST   Final Result   No evidence of pulmonary embolism or acute pulmonary abnormality. Left lower lobe lobulated nodule, not significantly changed from prior   examination. Extensive osseous metastatic disease.

## 2022-12-04 NOTE — ED PROVIDER NOTES
Will Abdul is a 63 y.o. female presenting to the ED for abdominal pain radiating around to left to the back, beginning weeks ago. The complaint has been persistent, moderate in severity, and worsened by nothing. 64 yo f who is undergoing workup for metastatic malignancy has a liver lesion she has scheduled to be biopsied this Thursday recently had a bone biopsy in her spine on 11/10. Pt notes has had abdominal pain for several weeks has been taking tylenol w coideine and then percocet and nausea meds, sx not improving so patient came in pain is diffuse and 10/10. Pt is have watery stool, last bm last night and pt goes 2-3 times per day w boost. Pt denies any vomiting. Pt denies any cough or cold sx cp or sob. Pt denies any headache or neck pain. Pt notes she had a low grade fever several days ago but nothing in the past several days. Last pain pill 11am. Pt denies any urinary or bowl incontinence or retention, denies any cauda equina sx    Review of Systems:   Review of Systems   Constitutional:  Positive for unexpected weight change. Negative for fatigue. HENT:  Negative for congestion, sinus pain and sore throat. Eyes:  Negative for photophobia and visual disturbance. Respiratory:  Negative for cough and shortness of breath. Cardiovascular:  Negative for chest pain. Gastrointestinal:  Positive for abdominal pain and diarrhea. Endocrine: Negative for polyphagia and polyuria. Genitourinary:  Negative for flank pain and frequency. Musculoskeletal:  Positive for back pain. Negative for neck pain. Skin:  Negative for pallor and rash. Allergic/Immunologic: Negative for food allergies and immunocompromised state. Neurological:  Negative for dizziness, numbness and headaches. Hematological:  Negative for adenopathy. Does not bruise/bleed easily.    Psychiatric/Behavioral:  The patient is nervous/anxious.              --------------------------------------------- PAST HISTORY ---------------------------------------------  Past Medical History:  has a past medical history of Cancer ([facility] [address] Hyperlipidemia, Hypertension, and Prediabetes. Past Surgical History:  has a past surgical history that includes Upper gastrointestinal endoscopy (01/09/2018); Colonoscopy (01/09/2018); Gastric bypass surgery (09/2019); and CT BIOPSY SUPERFICIAL BONE PERCUTANEOUS (11/10/2022). Social History:  reports that she quit smoking about 32 years ago. Her smoking use included cigarettes. She started smoking about 44 years ago. She has never used smokeless tobacco. She reports that she does not drink alcohol and does not use drugs. Family History: family history includes Heart Attack in her maternal grandmother and paternal grandmother; Prostate Cancer in her paternal grandfather; Stroke in her maternal grandmother and paternal grandmother. The patients home medications have been reviewed. Allergies: Patient has no known allergies.     -------------------------------------------------- RESULTS -------------------------------------------------  All laboratory and radiology results have been personally reviewed by myself   LABS:  Results for orders placed or performed during the hospital encounter of 12/03/22   COVID-19, Rapid    Specimen: Nasopharyngeal Swab   Result Value Ref Range    SARS-CoV-2, NAAT Not Detected Not Detected   CBC with Auto Differential   Result Value Ref Range    WBC 6.9 4.5 - 11.5 E9/L    RBC 4.51 3.50 - 5.50 E12/L    Hemoglobin 10.7 (L) 11.5 - 15.5 g/dL    Hematocrit 32.6 (L) 34.0 - 48.0 %    MCV 72.3 (L) 80.0 - 99.9 fL    MCH 23.7 (L) 26.0 - 35.0 pg    MCHC 32.8 32.0 - 34.5 %    RDW 17.2 (H) 11.5 - 15.0 fL    Platelets 102 941 - 201 E9/L    MPV 9.5 7.0 - 12.0 fL    Neutrophils % 69.7 43.0 - 80.0 %    Immature Granulocytes % 0.3 0.0 - 5.0 %    Lymphocytes % 18.8 (L) 20.0 - 42.0 %    Monocytes % 10.2 2.0 - 12.0 %    Eosinophils % 0.4 0.0 - 6.0 %    Basophils % 0.6 0.0 - 2.0 % Neutrophils Absolute 4.79 1.80 - 7.30 E9/L    Immature Granulocytes # 0.02 E9/L    Lymphocytes Absolute 1.29 (L) 1.50 - 4.00 E9/L    Monocytes Absolute 0.70 0.10 - 0.95 E9/L    Eosinophils Absolute 0.03 (L) 0.05 - 0.50 E9/L    Basophils Absolute 0.04 0.00 - 0.20 E9/L   BMP   Result Value Ref Range    Sodium 137 132 - 146 mmol/L    Potassium 3.9 3.5 - 5.0 mmol/L    Chloride 105 98 - 107 mmol/L    CO2 23 22 - 29 mmol/L    Anion Gap 9 7 - 16 mmol/L    Glucose 85 74 - 99 mg/dL    BUN 8 6 - 23 mg/dL    Creatinine 0.5 0.5 - 1.0 mg/dL    Est, Glom Filt Rate >60 >=60 mL/min/1.73    Calcium 7.3 (L) 8.6 - 10.2 mg/dL   Hepatic Function Panel   Result Value Ref Range    Total Protein 6.3 (L) 6.4 - 8.3 g/dL    Albumin 2.7 (L) 3.5 - 5.2 g/dL    Alkaline Phosphatase 346 (H) 35 - 104 U/L    ALT 33 (H) 0 - 32 U/L    AST 76 (H) 0 - 31 U/L    Total Bilirubin 0.3 0.0 - 1.2 mg/dL    Bilirubin, Direct <0.2 0.0 - 0.3 mg/dL    Bilirubin, Indirect see below 0.0 - 1.0 mg/dL   Lactic Acid   Result Value Ref Range    Lactic Acid 1.1 0.5 - 2.2 mmol/L   Lipase   Result Value Ref Range    Lipase 30 13 - 60 U/L   Magnesium   Result Value Ref Range    Magnesium 1.7 1.6 - 2.6 mg/dL   Protime-INR   Result Value Ref Range    Protime 15.4 (H) 9.3 - 12.4 sec    INR 1.4    Troponin   Result Value Ref Range    Troponin, High Sensitivity 8 0 - 9 ng/L   TSH   Result Value Ref Range    TSH 1.210 0.270 - 4.200 uIU/mL   EKG 12 Lead   Result Value Ref Range    Ventricular Rate 97 BPM    Atrial Rate 97 BPM    P-R Interval 136 ms    QRS Duration 58 ms    Q-T Interval 370 ms    QTc Calculation (Bazett) 469 ms    P Axis 48 degrees    R Axis 28 degrees    T Axis 37 degrees       RADIOLOGY:  Interpreted by Radiologist.  CT ABDOMEN PELVIS W IV CONTRAST Additional Contrast? None   Final Result   Extensive sclerotic osseous changes consistent with metastatic disease. Innumerable hepatic masses consistent with metastatic disease. Cholecystectomy. Multiple punctate nonobstructing right renal calculi. CTA PULMONARY W CONTRAST   Final Result   No evidence of pulmonary embolism or acute pulmonary abnormality. Left lower lobe lobulated nodule, not significantly changed from prior   examination. Extensive osseous metastatic disease.             ------------------------- NURSING NOTES AND VITALS REVIEWED ---------------------------   The nursing notes within the ED encounter and vital signs as below have been reviewed. /70   Pulse 96   Temp 98.3 °F (36.8 °C) (Oral)   Resp 15   Ht 5' (1.524 m)   Wt 167 lb (75.8 kg)   LMP 01/10/2013   SpO2 96%   BMI 32.61 kg/m²   Oxygen Saturation Interpretation: Normal      ---------------------------------------------------PHYSICAL EXAM--------------------------------------    Physical Exam  Vitals reviewed. Constitutional:       General: She is not in acute distress. Appearance: Normal appearance. She is not toxic-appearing. HENT:      Head: Normocephalic and atraumatic. Right Ear: External ear normal.      Left Ear: External ear normal.      Nose: Nose normal. No congestion. Mouth/Throat:      Mouth: Mucous membranes are moist.      Pharynx: Oropharynx is clear. No posterior oropharyngeal erythema. Eyes:      Extraocular Movements: Extraocular movements intact. Pupils: Pupils are equal, round, and reactive to light. Cardiovascular:      Rate and Rhythm: Normal rate and regular rhythm. Pulses: Normal pulses. Heart sounds: No murmur heard. Pulmonary:      Effort: Pulmonary effort is normal.      Breath sounds: No wheezing or rhonchi. Chest:      Chest wall: No tenderness. Abdominal:      General: Bowel sounds are normal.      Palpations: Abdomen is soft. Tenderness: There is generalized abdominal tenderness. There is left CVA tenderness. There is no right CVA tenderness or guarding. Hernia: No hernia is present.    Musculoskeletal: General: No swelling or deformity. Cervical back: Normal range of motion and neck supple. No muscular tenderness. Skin:     General: Skin is warm and dry. Capillary Refill: Capillary refill takes less than 2 seconds. Coloration: Skin is not mottled. Neurological:      General: No focal deficit present. Mental Status: She is alert and oriented to person, place, and time. Cranial Nerves: No cranial nerve deficit. Motor: No weakness. Psychiatric:         Mood and Affect: Mood normal. Mood is not depressed. ------------------------------ ED COURSE/MEDICAL DECISION MAKING----------------------  Medications   0.9 % sodium chloride bolus (0 mLs IntraVENous Stopped 12/3/22 2228)   morphine sulfate (PF) injection 4 mg (4 mg IntraVENous Given 12/3/22 2013)   iopamidol (ISOVUE-370) 76 % injection 75 mL (75 mLs IntraVENous Given 12/3/22 2148)     EKG: This EKG is signed and interpreted by me. [id number]  Rate: 97  Rhythm: Sinus  Interpretation: no acute changes  Comparison: stable as compared to patient's most recent EKG          ED COURSE:       Medical Decision Making:    Patient has known metastatic cancer. She presented for uncontrolled pain secondary to her malignancy. She is on chronic narcotic therapy at home. She presented for breakthrough pain. Patient had CAT scans done which show worsening metastasis. Otherwise there is no acute findings today. She does have back pain. She has no evidence of any cauda equina syndrome or cord compression. She is ambulatory. She was given IV narcotics in the ER and felt markedly better. She has an appointment with her oncologist this week. She also has an appointment for further testing this week. She will keep her testing she is also to follow-up with her doctor as scheduled. She is to follow-up with her PCP this week. I discussed warning signs symptoms when return she verbalized understanding. Patient is to have her oncologist and family doctor obtain copies of all testing and review it with her. Counseling: The emergency provider has spoken with the patient and discussed todays results, in addition to providing specific details for the plan of care and counseling regarding the diagnosis and prognosis. Questions are answered at this time and they are agreeable with the plan.      --------------------------------- IMPRESSION AND DISPOSITION ---------------------------------    IMPRESSION  1. Generalized abdominal pain    2. Acute left-sided low back pain without sciatica    3. Metastatic malignant neoplasm, unspecified site ([facility] [address]    4. Abnormal CT of the abdomen        DISPOSITION  Disposition: Discharge to home  Patient condition is good      NOTE: This report was transcribed using voice recognition software.  Every effort was made to ensure accuracy; however, inadvertent computerized transcription errors may be present       Keenan Lai DO  12/04/22 2333

## 2022-12-08 ENCOUNTER — HOSPITAL ENCOUNTER (OUTPATIENT)
Dept: CT IMAGING | Age: 62
Discharge: HOME | End: 2022-12-10
Payer: COMMERCIAL

## 2022-12-08 VITALS
HEIGHT: 61 IN | RESPIRATION RATE: 15 BRPM | DIASTOLIC BLOOD PRESSURE: 58 MMHG | TEMPERATURE: 99 F | SYSTOLIC BLOOD PRESSURE: 111 MMHG | WEIGHT: 170 LBS | HEART RATE: 104 BPM | OXYGEN SATURATION: 99 % | BODY MASS INDEX: 32.1 KG/M2

## 2022-12-08 DIAGNOSIS — G89.3 ACUTE NEOPLASM-RELATED PAIN: ICD-10-CM

## 2022-12-08 DIAGNOSIS — K63.5 HYPERPLASTIC POLYP OF INTESTINE: ICD-10-CM

## 2022-12-08 DIAGNOSIS — C79.52 SECONDARY MALIGNANT NEOPLASM OF BONE AND BONE MARROW (HCC): ICD-10-CM

## 2022-12-08 DIAGNOSIS — C79.51 SECONDARY MALIGNANT NEOPLASM OF BONE AND BONE MARROW (HCC): ICD-10-CM

## 2022-12-08 DIAGNOSIS — D64.9 ANEMIA, UNSPECIFIED TYPE: ICD-10-CM

## 2022-12-08 DIAGNOSIS — N60.29 FIBROADENOSIS OF BREAST, UNSPECIFIED LATERALITY: ICD-10-CM

## 2022-12-08 PROCEDURE — 47000 NEEDLE BIOPSY OF LIVER PERQ: CPT

## 2022-12-08 PROCEDURE — 36415 COLL VENOUS BLD VENIPUNCTURE: CPT

## 2022-12-08 PROCEDURE — 7100000011 HC PHASE II RECOVERY - ADDTL 15 MIN

## 2022-12-08 PROCEDURE — 2709999900 CT GUIDED NEEDLE PLACEMENT

## 2022-12-08 PROCEDURE — 7100000010 HC PHASE II RECOVERY - FIRST 15 MIN

## 2022-12-08 PROCEDURE — 6360000004 HC RX CONTRAST MEDICATION: Performed by: RADIOLOGY

## 2022-12-08 PROCEDURE — 6360000002 HC RX W HCPCS: Performed by: RADIOLOGY

## 2022-12-08 RX ORDER — FENTANYL CITRATE 50 UG/ML
INJECTION, SOLUTION INTRAMUSCULAR; INTRAVENOUS
Status: COMPLETED | OUTPATIENT
Start: 2022-12-08 | End: 2022-12-08

## 2022-12-08 RX ORDER — MIDAZOLAM HYDROCHLORIDE 2 MG/2ML
INJECTION, SOLUTION INTRAMUSCULAR; INTRAVENOUS
Status: COMPLETED | OUTPATIENT
Start: 2022-12-08 | End: 2022-12-08

## 2022-12-08 RX ADMIN — FENTANYL CITRATE 50 MCG: 50 INJECTION, SOLUTION INTRAMUSCULAR; INTRAVENOUS at 11:39

## 2022-12-08 RX ADMIN — MIDAZOLAM HYDROCHLORIDE 1 MG: 1 INJECTION, SOLUTION INTRAMUSCULAR; INTRAVENOUS at 11:39

## 2022-12-08 RX ADMIN — IOPAMIDOL 1 ML: 755 INJECTION, SOLUTION INTRAVENOUS at 11:49

## 2022-12-08 ASSESSMENT — PAIN SCALES - GENERAL: PAINLEVEL_OUTOF10: 5

## 2022-12-08 ASSESSMENT — PAIN DESCRIPTION - LOCATION: LOCATION: ABDOMEN

## 2022-12-08 NOTE — POST SEDATION
POST SEDATION NOTE:  Time: 11:28 AM    Cardiopulmonary: Vitals Signs Stable: yes    Level of Consciousness: alert    Reversal Agent Used: no    Complications: none    Follow-up/Observations: none    Pain Score: 1    Evangelista Valerio MD

## 2022-12-08 NOTE — BRIEF OP NOTE
Brief Postoperative Note    Sayda Sher  YOB: 1960  [id number]    Pre-operative Diagnosis and Procedure: CT GUIDED NEEDLE PLACEMENT  Secondary malignant neoplasm of bone and bone marrow ([address] Anemia, unspecified type, Hyperplasti. ..plan liver biopsy    Post-operative Diagnosis: Same    Anesthesia: Local    Estimated Blood Loss: < 10 cc    Surgeon: [place]i CAMPOS     Complications: none    Specimen obtained: yes    Findings: none \    Juan Carlos Barlow II, MD   12/8/2022 11:27 AM

## 2022-12-08 NOTE — PRE SEDATION
Felicia Floyd II, MD  12/8/2022  11:29 AM        PRE-SEDATION PHYSICIAN ASSESSMENT:      1. HISTORY & PHYSICAL EXAMINATION:  Comments: none    Vitals:    12/08/22 1052   BP: 106/61   Pulse: 98   Resp: 14   Temp: 99 °F (37.2 °C)   SpO2: 100%       Allergies: Patient has no known allergies. 2. Heart and Lungs immediately prior to procedure demonstrate no contraindications to proceed      Chief Complaint: <principal problem not specified>    Drug: unknown  Tobacco: unknown    3. PAST ANESTHESIA EXPERIENCE:  unknown. 4. AIRWAY/TEETH/HEAD & NECK(Mallampati Classification):  II (soft palate, uvula, fauces visible)    5: NORMAL RANGE OF MOTION OF NECK: no    6. PATIENT WILL LIKELY TOLERATE PLAN OF MODERATE SEDATION    7. ASA 2.     Naina Liz MD

## 2022-12-08 NOTE — H&P
Interventional Radiology  Attending Pre-operative History and Physical    DIAGNOSIS:    Patient Active Problem List   Diagnosis    Dysphagia    Heartburn    Indigestion    Abdominal pain, RLQ    Change in bowel habits    Chest pain       CHIEF COMPLAINT: <principal problem not specified>          Current Outpatient Medications:     lidocaine (LIDODERM) 5 %, Place 1 patch onto the skin daily for 10 days 12 hours on, 12 hours off., Disp: 10 patch, Rfl: 0    ondansetron (ZOFRAN-ODT) 4 MG disintegrating tablet, Take 1 tablet by mouth 3 times daily as needed for Nausea or Vomiting, Disp: 21 tablet, Rfl: 0    predniSONE (DELTASONE) 20 MG tablet, Take 1 tablet by mouth 2 times daily for 5 days, Disp: 10 tablet, Rfl: 0    pantoprazole (PROTONIX) 40 MG tablet, Take 1 tablet by mouth daily (with breakfast), Disp: 30 tablet, Rfl: 3    famotidine (PEPCID) 20 MG tablet, Take 1 tablet by mouth 2 times daily, Disp: 60 tablet, Rfl: 0    sucralfate (CARAFATE) 1 GM tablet, Take 1 tablet by mouth 4 times daily, Disp: 120 tablet, Rfl: 0    clotrimazole-betamethasone (LOTRISONE) 1-0.05 % cream, clotrimazole-betamethasone 1 %-0.05 % topical cream, Disp: , Rfl:     ammonium lactate (LAC-HYDRIN) 12 % lotion, ammonium lactate 12 % lotion, Disp: , Rfl:     benzonatate (TESSALON) 100 MG capsule, benzonatate 100 mg capsule, Disp: , Rfl:     albuterol sulfate  (90 Base) MCG/ACT inhaler, albuterol sulfate HFA 90 mcg/actuation aerosol inhaler, Disp: , Rfl:     Lancets (ONETOUCH DELICA PLUS [id number]) MISC, use twice a day, Disp: , Rfl:     amLODIPine (NORVASC) 5 MG tablet, amlodipine 5 mg tablet (Patient not taking: No sig reported), Disp: , Rfl:     blood glucose test strips (EXACTECH TEST) strip, FreeStyle Lite Strips, Disp: , Rfl:     No Known Allergies    Past Medical History:   Diagnosis Date    Cancer ([facility] [address]     Hyperlipidemia     Hypertension     Prediabetes        Past Surgical History:   Procedure Laterality Date    COLONOSCOPY 2018    few diverticula--[place]    CT BIOPSY PERCUTANEOUS SUPERFICIAL BONE  11/10/2022    CT BIOPSY PERCUTANEOUS SUPERFICIAL BONE 11/10/2022 [facility] CT    GASTRIC BYPASS SURGERY  2019    UPPER GASTROINTESTINAL ENDOSCOPY  2018    mild gastritis; small hiatal hernia--[place]       Family History   Problem Relation Age of Onset    Heart Attack Maternal Grandmother     Stroke Maternal Grandmother     Heart Attack Paternal Grandmother     Stroke Paternal Grandmother     Prostate Cancer Paternal Grandfather        Social History     Socioeconomic History    Marital status:      Spouse name: Not on file    Number of children: Not on file    Years of education: Not on file    Highest education level: Not on file   Occupational History    Not on file   Tobacco Use    Smoking status: Former     Years: 10.     Types: Cigarettes     Start date:      Quit date:      Years since quittin.9    Smokeless tobacco: Never   Vaping Use    Vaping Use: Never used   Substance and Sexual Activity    Alcohol use: No    Drug use: No    Sexual activity: Never   Other Topics Concern    Not on file   Social History Narrative    Cut out pop since bariatric sx on 9/10/19; no coffee/tea     Social Determinants of Health     Financial Resource Strain: Not on file   Food Insecurity: Not on file   Transportation Needs: Not on file   Physical Activity: Not on file   Stress: Not on file   Social Connections: Not on file   Intimate Partner Violence: Not on file   Housing Stability: Not on file       ROS: Non-contributory other than as noted above    PHYSICAL EXAM:      Heart and Lungs:  demonstrate no contraindications to proceed    DATA:  CBC:   Lab Results   Component Value Date/Time    WBC 6.9 2022 07:51 PM    RBC 4.51 2022 07:51 PM    HGB 10.7 2022 07:51 PM    HCT 32.6 2022 07:51 PM    MCV 72.3 2022 07:51 PM    MCH 23.7 2022 07:51 PM    MCHC 32.8 2022 07:51 PM    RDW 17.2 12/03/2022 07:51 PM     12/03/2022 07:51 PM    MPV 9.5 12/03/2022 07:51 PM     CBC with Differential:    Lab Results   Component Value Date/Time    WBC 6.9 12/03/2022 07:51 PM    RBC 4.51 12/03/2022 07:51 PM    HGB 10.7 12/03/2022 07:51 PM    HCT 32.6 12/03/2022 07:51 PM     12/03/2022 07:51 PM    MCV 72.3 12/03/2022 07:51 PM    MCH 23.7 12/03/2022 07:51 PM    MCHC 32.8 12/03/2022 07:51 PM    RDW 17.2 12/03/2022 07:51 PM    SEGSPCT 55 01/17/2013 10:52 AM    BANDSPCT 1 07/23/2015 10:10 PM    LYMPHOPCT 18.8 12/03/2022 07:51 PM    MONOPCT 10.2 12/03/2022 07:51 PM    BASOPCT 0.6 12/03/2022 07:51 PM    MONOSABS 0.70 12/03/2022 07:51 PM    LYMPHSABS 1.29 12/03/2022 07:51 PM    EOSABS 0.03 12/03/2022 07:51 PM    BASOSABS 0.04 12/03/2022 07:51 PM     Platelets:    Lab Results   Component Value Date/Time     12/03/2022 07:51 PM     BUN/Creatinine:    Lab Results   Component Value Date/Time    BUN 8 12/03/2022 07:51 PM    CREATININE 0.5 12/03/2022 07:51 PM       ASSESSMENT AND PLAN:  1. CT GUIDED NEEDLE PLACEMENT  Secondary malignant neoplasm of bone and bone marrow ([facility] [address] Anemia, unspecified type, liver mass plan liver bx  2. Procedure options, risks and benefits reviewed with patient. Patient expresses understanding.     Electronically signed by Capo Trevizo II, MD on 12/8/2022 at 11:28 AM

## 2022-12-08 NOTE — PROCEDURES
1215 Dressing checked, clean, dry, and intact. Patient stable. No s/s of complications noted or reported. Patient eating and drinking well with no s/s of complications noted or reported. 1230 Dressing checked, clean, dry, and intact. Patient stable. No s/s of complications noted or reported. [address] checked, clean, dry, and intact. Patient stable. No s/s of complications noted or reported. 1300 Patient discharged, site was checked with every set of vitals. Site clean dry and intact. Discharge papers reviewed with patient, questions answered, discharge paper signed. Patient taken to door [phone]. Patient in stable condition, no s/s of complications noted or reported.

## 2023-01-04 ENCOUNTER — NURSE TRIAGE (OUTPATIENT)
Dept: OTHER | Facility: CLINIC | Age: 63
End: 2023-01-04

## 2023-01-04 NOTE — TELEPHONE ENCOUNTER
Location of patient: [address] call from [place] at [employer] with [employer] Subjective: Caller states \"I want to come in and get a COVID test, that's it. I am lightheaded and dizzy. \"     Current Symptoms: dizzy episodes, lasting a few seconds. No known trigger. States has been in an out of [facility] due to cancer, states unsure if exposed to [place]. Denies other exposure to COVID. Onset: 3 days ago; worsening    Associated Symptoms: chronic nausea, not new s/s. Pain Severity: 0/10; ;     Temperature: denies     What has been tried: rest, fluids    LMP: NA Pregnant: NA    Recommended disposition: See in Office Today    Care advice provided, patient verbalizes understanding; denies any other questions or concerns; instructed to call back for any new or worsening symptoms. Patient/Caller agrees with recommended disposition; writer provided warm transfer to [place] at [facility] [employer] for appointment scheduling    Attention Provider: Thank you for allowing me to participate in the care of your patient. The patient was connected to triage in response to information provided to the ECC/PSC. Please do not respond through this encounter as the response is not directed to a shared pool.         Reason for Disposition   MODERATE dizziness (e.g., interferes with normal activities) (Exception: dizziness caused by heat exposure, sudden standing, or poor fluid intake)    Protocols used: Dizziness-ADULT-[place]

## 2023-01-05 ENCOUNTER — OFFICE VISIT (OUTPATIENT)
Dept: PRIMARY CARE CLINIC | Age: 63
End: 2023-01-05
Payer: COMMERCIAL

## 2023-01-05 VITALS
OXYGEN SATURATION: 96 % | DIASTOLIC BLOOD PRESSURE: 69 MMHG | HEIGHT: 60 IN | SYSTOLIC BLOOD PRESSURE: 109 MMHG | BODY MASS INDEX: 32.2 KG/M2 | RESPIRATION RATE: 18 BRPM | TEMPERATURE: 98.2 F | WEIGHT: 164 LBS | HEART RATE: 99 BPM

## 2023-01-05 DIAGNOSIS — Z11.52 ENCOUNTER FOR SCREENING FOR COVID-19: ICD-10-CM

## 2023-01-05 DIAGNOSIS — J01.40 ACUTE NON-RECURRENT PANSINUSITIS: Primary | ICD-10-CM

## 2023-01-05 DIAGNOSIS — R52 BODY ACHES: ICD-10-CM

## 2023-01-05 LAB
INFLUENZA A ANTIBODY: NORMAL
INFLUENZA B ANTIBODY: NORMAL
Lab: NORMAL
PERFORMING INSTRUMENT: NORMAL
QC PASS/FAIL: NORMAL
SARS-COV-2, POC: NORMAL

## 2023-01-05 PROCEDURE — 99203 OFFICE O/P NEW LOW 30 MIN: CPT | Performed by: NURSE PRACTITIONER

## 2023-01-05 PROCEDURE — G8427 DOCREV CUR MEDS BY ELIG CLIN: HCPCS | Performed by: NURSE PRACTITIONER

## 2023-01-05 PROCEDURE — G8484 FLU IMMUNIZE NO ADMIN: HCPCS | Performed by: NURSE PRACTITIONER

## 2023-01-05 PROCEDURE — 3017F COLORECTAL CA SCREEN DOC REV: CPT | Performed by: NURSE PRACTITIONER

## 2023-01-05 PROCEDURE — 87804 INFLUENZA ASSAY W/OPTIC: CPT | Performed by: NURSE PRACTITIONER

## 2023-01-05 PROCEDURE — 87426 SARSCOV CORONAVIRUS AG IA: CPT | Performed by: NURSE PRACTITIONER

## 2023-01-05 PROCEDURE — 1036F TOBACCO NON-USER: CPT | Performed by: NURSE PRACTITIONER

## 2023-01-05 PROCEDURE — G8417 CALC BMI ABV UP PARAM F/U: HCPCS | Performed by: NURSE PRACTITIONER

## 2023-01-05 RX ORDER — FLUTICASONE PROPIONATE 50 MCG
1 SPRAY, SUSPENSION (ML) NASAL 2 TIMES DAILY
Qty: 16 G | Refills: 0 | Status: SHIPPED | OUTPATIENT
Start: 2023-01-05

## 2023-01-05 RX ORDER — METHYLPREDNISOLONE 4 MG/1
TABLET ORAL
Qty: 1 KIT | Refills: 0 | Status: SHIPPED
Start: 2023-01-05 | End: 2023-01-05

## 2023-01-05 RX ORDER — METHYLPREDNISOLONE 4 MG/1
TABLET ORAL
Qty: 1 KIT | Refills: 0 | Status: SHIPPED | OUTPATIENT
Start: 2023-01-05

## 2023-01-05 RX ORDER — AMOXICILLIN 500 MG/1
500 CAPSULE ORAL 3 TIMES DAILY
Qty: 21 CAPSULE | Refills: 0 | Status: SHIPPED
Start: 2023-01-05 | End: 2023-01-05

## 2023-01-05 RX ORDER — FLUTICASONE PROPIONATE 50 MCG
1 SPRAY, SUSPENSION (ML) NASAL 2 TIMES DAILY
Qty: 16 G | Refills: 0 | Status: SHIPPED
Start: 2023-01-05 | End: 2023-01-05

## 2023-01-05 RX ORDER — AMOXICILLIN 500 MG/1
500 CAPSULE ORAL 3 TIMES DAILY
Qty: 21 CAPSULE | Refills: 0 | Status: SHIPPED | OUTPATIENT
Start: 2023-01-05 | End: 2023-01-12

## 2023-01-05 NOTE — PROGRESS NOTES
Chief Complaint:   Headache (Congested, coughing, body chills, body aches. Day three of this and not getting any better. )    History of Present Illness   Source of history provided by:  patient. Lakia Marlow is a 63 y.o. old female who presents to walk-in for evaluation of sinus pressure, nasal congestion, discolored nasal drainage, bilateral ear pressure, mild nonproductive cough, fatigue and sore throat x 4 days. Has been taking Robitussin OTC without relief. Denies any fever, chills, wheezing, CP, SOB, or GI symptoms. Denies any hx of asthma, COPD, or tobacco use. No known sick contacts. Review of Systems   Unless otherwise stated in this report or unable to obtain because of the patient's clinical or mental status as evidenced by the medical record, this patients's positive and negative responses for Review of Systems, constitutional, psych, eyes, ENT, cardiovascular, respiratory, gastrointestinal, neurological, genitourinary, musculoskeletal, integument systems and systems related to the presenting problem are either stated in the preceding or were negative for the symptoms and/or complaints related to the medical problem. Past Medical History:  has a past medical history of Cancer ([address] Hyperlipidemia, Hypertension, and Prediabetes. Past Surgical History:  has a past surgical history that includes Upper gastrointestinal endoscopy (01/09/2018); Colonoscopy (01/09/2018); Gastric bypass surgery (09/2019); CT BIOPSY SUPERFICIAL BONE PERCUTANEOUS (11/10/2022); and CT NEEDLE BIOPSY LIVER PERCUTANEOUS (12/8/2022). Social History:  reports that she quit smoking about 33 years ago. Her smoking use included cigarettes. She started smoking about 45 years ago. She has never used smokeless tobacco. She reports that she does not drink alcohol and does not use drugs.   Family History: family history includes Heart Attack in her maternal grandmother and paternal grandmother; Prostate Cancer in her paternal grandfather; Stroke in her maternal grandmother and paternal grandmother. Allergies: Patient has no known allergies. Physical Exam   Vital Signs:  /69   Pulse 99   Temp 98.2 °F (36.8 °C)   Resp 18   Ht 5' (1.524 m)   Wt 164 lb (74.4 kg)   LMP 01/10/2013   SpO2 96%   BMI 32.03 kg/m²    Oxygen Saturation Interpretation: Normal.    Constitutional:  Alert, development consistent with age. Head: There is moderate TTP over the frontal and maxillary sinuses. Ears: Bilateral pinna normal. TMs with clear serous effusion without erythema or perforation bilaterally. Canals normal bilaterally without swelling or exudate  Nose:  There is mild congestion of the nasal mucosa. There is mild injection to middle turbinates bilaterally. Throat: There is mild posterior pharyngeal erythema with mild post nasal drip present. No exudate or tonsillar hypertrophy noted. Neck:  Supple. There is no anterior cervical adenopathy. Lungs: CTAB without wheezes, rales, or rhonchi  Heart:  Regular rate and rhythm, normal heart sounds, without pathological murmurs, ectopy, gallops, or rubs. Skin:  Normal turgor. Warm, dry, without visible rash. Neurological:  Alert and oriented. Motor functions intact. Responds to verbal commands. Test Results Section   (All laboratory and radiology results have been personally reviewed by myself)  Labs:  Results for orders placed or performed in visit on 01/05/23   POCT COVID-19, Antigen   Result Value Ref Range    SARS-COV-2, POC Not-Detected Not Detected    Lot Number [id number]     QC Pass/Fail pass     Performing Instrument BD Veritor    POCT Influenza A/B   Result Value Ref Range    Influenza A Ab neg     Influenza B Ab neg      Assessment / Plan   Impression(s):  Santo Mackey was seen today for headache. Diagnoses and all orders for this visit:    Acute non-recurrent pansinusitis  -     amoxicillin (AMOXIL) 500 MG capsule;  Take 1 capsule by mouth 3 times daily for 7 days  -     fluticasone (FLONASE) 50 MCG/ACT nasal spray; 1 spray by Each Nostril route in the morning and at bedtime  -     methylPREDNISolone (MEDROL DOSEPACK) 4 MG tablet; Take by mouth. Encounter for screening for COVID-19  -     POCT COVID-19, Antigen    Body aches  -     POCT Influenza A/B    Rapid influenza and COVID-19 negative in office, patient advised results. Script written for amoxicillin, Medrol Dosepak and Flonase, side effects discussed. Increase fluids and rest. Symptomatic relief discussed. F/u PCP in 5-7 days if symptoms persist. ED sooner if symptoms worsen or change. Red flag symptoms discussed. Patient verbalized understanding and is in agreement with this care plan. All questions answered. Return if symptoms worsen or fail to improve. Electronically signed by MAXIMUS Bob CNP   DD: 1/5/23    **This report was transcribed using voice recognition software. Every effort was made to ensure accuracy; however, inadvertent computerized transcription errors may be present.

## 2023-01-17 ENCOUNTER — APPOINTMENT (OUTPATIENT)
Dept: CT IMAGING | Age: 63
End: 2023-01-17
Payer: COMMERCIAL

## 2023-01-17 ENCOUNTER — APPOINTMENT (OUTPATIENT)
Dept: GENERAL RADIOLOGY | Age: 63
End: 2023-01-17
Payer: COMMERCIAL

## 2023-01-17 ENCOUNTER — HOSPITAL ENCOUNTER (INPATIENT)
Age: 63
LOS: 3 days | Discharge: HOME | End: 2023-01-21
Attending: EMERGENCY MEDICINE | Admitting: INTERNAL MEDICINE
Payer: COMMERCIAL

## 2023-01-17 DIAGNOSIS — C79.89 MALIGNANT NEOPLASM METASTATIC TO OTHER SITE (HCC): Primary | ICD-10-CM

## 2023-01-17 DIAGNOSIS — R10.9 ABDOMINAL PAIN, UNSPECIFIED ABDOMINAL LOCATION: ICD-10-CM

## 2023-01-17 DIAGNOSIS — A41.9 SEPTICEMIA (HCC): ICD-10-CM

## 2023-01-17 DIAGNOSIS — G89.3 PAIN DUE TO NEOPLASM: ICD-10-CM

## 2023-01-17 LAB
ALBUMIN SERPL-MCNC: 2.3 G/DL (ref 3.5–5.2)
ALP BLD-CCNC: 394 U/L (ref 35–104)
ALT SERPL-CCNC: 39 U/L (ref 0–32)
ANION GAP SERPL CALCULATED.3IONS-SCNC: 15 MMOL/L (ref 7–16)
AST SERPL-CCNC: 115 U/L (ref 0–31)
BACTERIA: ABNORMAL /HPF
BASOPHILS ABSOLUTE: 0.04 E9/L (ref 0–0.2)
BASOPHILS RELATIVE PERCENT: 0.3 % (ref 0–2)
BILIRUB SERPL-MCNC: 0.6 MG/DL (ref 0–1.2)
BILIRUBIN URINE: NEGATIVE
BLOOD, URINE: NEGATIVE
BUN BLDV-MCNC: 13 MG/DL (ref 6–23)
CALCIUM SERPL-MCNC: 7.1 MG/DL (ref 8.6–10.2)
CHLORIDE BLD-SCNC: 101 MMOL/L (ref 98–107)
CLARITY: CLEAR
CO2: 18 MMOL/L (ref 22–29)
COLOR: YELLOW
CREAT SERPL-MCNC: 0.7 MG/DL (ref 0.5–1)
EOSINOPHILS ABSOLUTE: 0 E9/L (ref 0.05–0.5)
EOSINOPHILS RELATIVE PERCENT: 0 % (ref 0–6)
EPITHELIAL CELLS, UA: ABNORMAL /HPF
GFR SERPL CREATININE-BSD FRML MDRD: >60 ML/MIN/1.73
GLUCOSE BLD-MCNC: 100 MG/DL (ref 74–99)
GLUCOSE URINE: NEGATIVE MG/DL
HCT VFR BLD CALC: 32.1 % (ref 34–48)
HEMOGLOBIN: 10.7 G/DL (ref 11.5–15.5)
IMMATURE GRANULOCYTES #: 0.1 E9/L
IMMATURE GRANULOCYTES %: 0.7 % (ref 0–5)
KETONES, URINE: NEGATIVE MG/DL
LACTIC ACID: 1.9 MMOL/L (ref 0.5–2.2)
LEUKOCYTE ESTERASE, URINE: NEGATIVE
LIPASE: 15 U/L (ref 13–60)
LYMPHOCYTES ABSOLUTE: 1 E9/L (ref 1.5–4)
LYMPHOCYTES RELATIVE PERCENT: 7.3 % (ref 20–42)
MAGNESIUM: 1.6 MG/DL (ref 1.6–2.6)
MCH RBC QN AUTO: 23.6 PG (ref 26–35)
MCHC RBC AUTO-ENTMCNC: 33.3 % (ref 32–34.5)
MCV RBC AUTO: 70.7 FL (ref 80–99.9)
MONOCYTES ABSOLUTE: 1.48 E9/L (ref 0.1–0.95)
MONOCYTES RELATIVE PERCENT: 10.8 % (ref 2–12)
NEUTROPHILS ABSOLUTE: 11.09 E9/L (ref 1.8–7.3)
NEUTROPHILS RELATIVE PERCENT: 80.9 % (ref 43–80)
NITRITE, URINE: NEGATIVE
PDW BLD-RTO: 16.1 FL (ref 11.5–15)
PH UA: 5 (ref 5–9)
PLATELET # BLD: 641 E9/L (ref 130–450)
PMV BLD AUTO: 10.2 FL (ref 7–12)
POTASSIUM SERPL-SCNC: 5.5 MMOL/L (ref 3.5–5)
PROTEIN UA: NORMAL MG/DL
RBC # BLD: 4.54 E12/L (ref 3.5–5.5)
RBC UA: ABNORMAL /HPF (ref 0–2)
REASON FOR REJECTION: NORMAL
REJECTED TEST: NORMAL
SODIUM BLD-SCNC: 134 MMOL/L (ref 132–146)
SPECIFIC GRAVITY UA: 1.01 (ref 1–1.03)
TOTAL PROTEIN: 6.7 G/DL (ref 6.4–8.3)
TROPONIN, HIGH SENSITIVITY: 6 NG/L (ref 0–9)
UROBILINOGEN, URINE: 0.2 E.U./DL
WBC # BLD: 13.7 E9/L (ref 4.5–11.5)
WBC UA: ABNORMAL /HPF (ref 0–5)

## 2023-01-17 PROCEDURE — 87088 URINE BACTERIA CULTURE: CPT

## 2023-01-17 PROCEDURE — 87636 SARSCOV2 & INF A&B AMP PRB: CPT

## 2023-01-17 PROCEDURE — 85025 COMPLETE CBC W/AUTO DIFF WBC: CPT

## 2023-01-17 PROCEDURE — 2580000003 HC RX 258: Performed by: EMERGENCY MEDICINE

## 2023-01-17 PROCEDURE — 6360000004 HC RX CONTRAST MEDICATION: Performed by: RADIOLOGY

## 2023-01-17 PROCEDURE — 71046 X-RAY EXAM CHEST 2 VIEWS: CPT

## 2023-01-17 PROCEDURE — 96376 TX/PRO/DX INJ SAME DRUG ADON: CPT

## 2023-01-17 PROCEDURE — 36415 COLL VENOUS BLD VENIPUNCTURE: CPT

## 2023-01-17 PROCEDURE — 74177 CT ABD & PELVIS W/CONTRAST: CPT

## 2023-01-17 PROCEDURE — 81001 URINALYSIS AUTO W/SCOPE: CPT

## 2023-01-17 PROCEDURE — 71275 CT ANGIOGRAPHY CHEST: CPT

## 2023-01-17 PROCEDURE — 80053 COMPREHEN METABOLIC PANEL: CPT

## 2023-01-17 PROCEDURE — 84484 ASSAY OF TROPONIN QUANT: CPT

## 2023-01-17 PROCEDURE — 83735 ASSAY OF MAGNESIUM: CPT

## 2023-01-17 PROCEDURE — 83690 ASSAY OF LIPASE: CPT

## 2023-01-17 PROCEDURE — 6370000000 HC RX 637 (ALT 250 FOR IP): Performed by: EMERGENCY MEDICINE

## 2023-01-17 PROCEDURE — 96361 HYDRATE IV INFUSION ADD-ON: CPT

## 2023-01-17 PROCEDURE — 6360000002 HC RX W HCPCS: Performed by: EMERGENCY MEDICINE

## 2023-01-17 PROCEDURE — 74177 CT ABD & PELVIS W/CONTRAST: CPT | Performed by: RADIOLOGY

## 2023-01-17 PROCEDURE — 83605 ASSAY OF LACTIC ACID: CPT

## 2023-01-17 PROCEDURE — 71275 CT ANGIOGRAPHY CHEST: CPT | Performed by: RADIOLOGY

## 2023-01-17 PROCEDURE — 99285 EMERGENCY DEPT VISIT HI MDM: CPT

## 2023-01-17 PROCEDURE — 93005 ELECTROCARDIOGRAM TRACING: CPT | Performed by: EMERGENCY MEDICINE

## 2023-01-17 PROCEDURE — 96375 TX/PRO/DX INJ NEW DRUG ADDON: CPT

## 2023-01-17 RX ORDER — ONDANSETRON 2 MG/ML
4 INJECTION INTRAMUSCULAR; INTRAVENOUS ONCE
Status: COMPLETED | OUTPATIENT
Start: 2023-01-17 | End: 2023-01-17

## 2023-01-17 RX ORDER — 0.9 % SODIUM CHLORIDE 0.9 %
1000 INTRAVENOUS SOLUTION INTRAVENOUS ONCE
Status: COMPLETED | OUTPATIENT
Start: 2023-01-17 | End: 2023-01-18

## 2023-01-17 RX ORDER — FENTANYL CITRATE 50 UG/ML
50 INJECTION, SOLUTION INTRAMUSCULAR; INTRAVENOUS ONCE
Status: COMPLETED | OUTPATIENT
Start: 2023-01-17 | End: 2023-01-17

## 2023-01-17 RX ORDER — OLANZAPINE 5 MG/1
5 TABLET ORAL NIGHTLY
COMMUNITY

## 2023-01-17 RX ORDER — MELATONIN
1000
COMMUNITY

## 2023-01-17 RX ORDER — ACETAMINOPHEN 500 MG
1000 TABLET ORAL ONCE
Status: COMPLETED | OUTPATIENT
Start: 2023-01-17 | End: 2023-01-17

## 2023-01-17 RX ORDER — 0.9 % SODIUM CHLORIDE 0.9 %
1000 INTRAVENOUS SOLUTION INTRAVENOUS ONCE
Status: COMPLETED | OUTPATIENT
Start: 2023-01-17 | End: 2023-01-17

## 2023-01-17 RX ADMIN — IOPAMIDOL 75 ML: 755 INJECTION, SOLUTION INTRAVENOUS at 20:43

## 2023-01-17 RX ADMIN — SODIUM CHLORIDE 1000 ML: 9 INJECTION, SOLUTION INTRAVENOUS at 19:22

## 2023-01-17 RX ADMIN — SODIUM CHLORIDE 1000 ML: 9 INJECTION, SOLUTION INTRAVENOUS at 23:28

## 2023-01-17 RX ADMIN — FENTANYL CITRATE 50 MCG: 50 INJECTION, SOLUTION INTRAMUSCULAR; INTRAVENOUS at 19:23

## 2023-01-17 RX ADMIN — ONDANSETRON 4 MG: 2 INJECTION INTRAMUSCULAR; INTRAVENOUS at 19:23

## 2023-01-17 RX ADMIN — FENTANYL CITRATE 50 MCG: 50 INJECTION, SOLUTION INTRAMUSCULAR; INTRAVENOUS at 23:23

## 2023-01-17 RX ADMIN — ACETAMINOPHEN 1000 MG: 500 TABLET, FILM COATED ORAL at 23:24

## 2023-01-17 ASSESSMENT — ENCOUNTER SYMPTOMS
COUGH: 0
ABDOMINAL PAIN: 0
SHORTNESS OF BREATH: 0
BACK PAIN: 1

## 2023-01-17 ASSESSMENT — PAIN DESCRIPTION - DESCRIPTORS: DESCRIPTORS: DISCOMFORT

## 2023-01-17 ASSESSMENT — PAIN DESCRIPTION - FREQUENCY: FREQUENCY: CONTINUOUS

## 2023-01-17 ASSESSMENT — PAIN DESCRIPTION - ONSET: ONSET: ON-GOING

## 2023-01-17 ASSESSMENT — PAIN SCALES - GENERAL
PAINLEVEL_OUTOF10: 8
PAINLEVEL_OUTOF10: 9
PAINLEVEL_OUTOF10: 9

## 2023-01-17 ASSESSMENT — PAIN DESCRIPTION - ORIENTATION
ORIENTATION: LEFT
ORIENTATION: RIGHT;LEFT

## 2023-01-17 ASSESSMENT — PAIN - FUNCTIONAL ASSESSMENT
PAIN_FUNCTIONAL_ASSESSMENT: ACTIVITIES ARE NOT PREVENTED
PAIN_FUNCTIONAL_ASSESSMENT: 0-10

## 2023-01-17 ASSESSMENT — PAIN DESCRIPTION - PAIN TYPE: TYPE: ACUTE PAIN

## 2023-01-17 ASSESSMENT — PAIN DESCRIPTION - LOCATION
LOCATION: ABDOMEN
LOCATION: ABDOMEN;SHOULDER

## 2023-01-18 PROBLEM — A41.9 SEPSIS (HCC): Status: ACTIVE | Noted: 2023-01-18

## 2023-01-18 LAB
ALBUMIN SERPL-MCNC: 1.9 G/DL (ref 3.5–5.2)
ALP BLD-CCNC: 312 U/L (ref 35–104)
ALT SERPL-CCNC: 32 U/L (ref 0–32)
ANION GAP SERPL CALCULATED.3IONS-SCNC: 10 MMOL/L (ref 7–16)
AST SERPL-CCNC: 64 U/L (ref 0–31)
BILIRUB SERPL-MCNC: 0.5 MG/DL (ref 0–1.2)
BUN BLDV-MCNC: 13 MG/DL (ref 6–23)
CALCIUM SERPL-MCNC: 6.3 MG/DL (ref 8.6–10.2)
CHLORIDE BLD-SCNC: 107 MMOL/L (ref 98–107)
CO2: 20 MMOL/L (ref 22–29)
CREAT SERPL-MCNC: 0.6 MG/DL (ref 0.5–1)
EKG ATRIAL RATE: 119 BPM
EKG P AXIS: 55 DEGREES
EKG P-R INTERVAL: 130 MS
EKG Q-T INTERVAL: 314 MS
EKG QRS DURATION: 54 MS
EKG QTC CALCULATION (BAZETT): 441 MS
EKG R AXIS: 37 DEGREES
EKG T AXIS: 42 DEGREES
EKG VENTRICULAR RATE: 119 BPM
GFR SERPL CREATININE-BSD FRML MDRD: >60 ML/MIN/1.73
GLUCOSE BLD-MCNC: 76 MG/DL (ref 74–99)
HBA1C MFR BLD: 4.8 % (ref 4–5.6)
INFLUENZA A: NOT DETECTED
INFLUENZA B: NOT DETECTED
LACTIC ACID, SEPSIS: 1.3 MMOL/L (ref 0.5–1.9)
METER GLUCOSE: 114 MG/DL (ref 74–99)
METER GLUCOSE: 75 MG/DL (ref 74–99)
METER GLUCOSE: 76 MG/DL (ref 74–99)
POTASSIUM REFLEX MAGNESIUM: 4.2 MMOL/L (ref 3.5–5)
SARS-COV-2 RNA, RT PCR: NOT DETECTED
SODIUM BLD-SCNC: 137 MMOL/L (ref 132–146)
TOTAL PROTEIN: 5.2 G/DL (ref 6.4–8.3)

## 2023-01-18 PROCEDURE — 2500000003 HC RX 250 WO HCPCS

## 2023-01-18 PROCEDURE — 6360000002 HC RX W HCPCS: Performed by: EMERGENCY MEDICINE

## 2023-01-18 PROCEDURE — 96365 THER/PROPH/DIAG IV INF INIT: CPT

## 2023-01-18 PROCEDURE — 2580000003 HC RX 258: Performed by: FAMILY MEDICINE

## 2023-01-18 PROCEDURE — 6370000000 HC RX 637 (ALT 250 FOR IP)

## 2023-01-18 PROCEDURE — 97165 OT EVAL LOW COMPLEX 30 MIN: CPT

## 2023-01-18 PROCEDURE — 83036 HEMOGLOBIN GLYCOSYLATED A1C: CPT

## 2023-01-18 PROCEDURE — 2580000003 HC RX 258: Performed by: EMERGENCY MEDICINE

## 2023-01-18 PROCEDURE — 80053 COMPREHEN METABOLIC PANEL: CPT

## 2023-01-18 PROCEDURE — 6360000002 HC RX W HCPCS: Performed by: FAMILY MEDICINE

## 2023-01-18 PROCEDURE — 2580000003 HC RX 258: Performed by: INTERNAL MEDICINE

## 2023-01-18 PROCEDURE — 2580000003 HC RX 258

## 2023-01-18 PROCEDURE — 6370000000 HC RX 637 (ALT 250 FOR IP): Performed by: INTERNAL MEDICINE

## 2023-01-18 PROCEDURE — 6360000002 HC RX W HCPCS

## 2023-01-18 PROCEDURE — 05H933Z INSERTION OF INFUSION DEVICE INTO RIGHT BRACHIAL VEIN, PERCUTANEOUS APPROACH: ICD-10-PCS | Performed by: INTERNAL MEDICINE

## 2023-01-18 PROCEDURE — 36415 COLL VENOUS BLD VENIPUNCTURE: CPT

## 2023-01-18 PROCEDURE — 93010 ELECTROCARDIOGRAM REPORT: CPT | Performed by: INTERNAL MEDICINE

## 2023-01-18 PROCEDURE — 2500000003 HC RX 250 WO HCPCS: Performed by: EMERGENCY MEDICINE

## 2023-01-18 PROCEDURE — 87324 CLOSTRIDIUM AG IA: CPT

## 2023-01-18 PROCEDURE — 87449 NOS EACH ORGANISM AG IA: CPT

## 2023-01-18 PROCEDURE — 83605 ASSAY OF LACTIC ACID: CPT

## 2023-01-18 PROCEDURE — 76937 US GUIDE VASCULAR ACCESS: CPT

## 2023-01-18 PROCEDURE — 96375 TX/PRO/DX INJ NEW DRUG ADDON: CPT

## 2023-01-18 PROCEDURE — C1751 CATH, INF, PER/CENT/MIDLINE: HCPCS

## 2023-01-18 PROCEDURE — 82962 GLUCOSE BLOOD TEST: CPT

## 2023-01-18 PROCEDURE — 36410 VNPNXR 3YR/> PHY/QHP DX/THER: CPT

## 2023-01-18 PROCEDURE — 2060000000 HC ICU INTERMEDIATE R&B

## 2023-01-18 PROCEDURE — 87040 BLOOD CULTURE FOR BACTERIA: CPT

## 2023-01-18 PROCEDURE — 96367 TX/PROPH/DG ADDL SEQ IV INF: CPT

## 2023-01-18 PROCEDURE — 6360000002 HC RX W HCPCS: Performed by: INTERNAL MEDICINE

## 2023-01-18 RX ORDER — ACETAMINOPHEN 650 MG/1
650 SUPPOSITORY RECTAL EVERY 4 HOURS PRN
Status: DISCONTINUED | OUTPATIENT
Start: 2023-01-18 | End: 2023-01-21 | Stop reason: HOSPADM

## 2023-01-18 RX ORDER — ACETAMINOPHEN 325 MG/1
650 TABLET ORAL EVERY 4 HOURS PRN
Status: DISCONTINUED | OUTPATIENT
Start: 2023-01-18 | End: 2023-01-21 | Stop reason: HOSPADM

## 2023-01-18 RX ORDER — SODIUM CHLORIDE 0.9 % (FLUSH) 0.9 %
5-40 SYRINGE (ML) INJECTION 2 TIMES DAILY
Status: DISCONTINUED | OUTPATIENT
Start: 2023-01-18 | End: 2023-01-21 | Stop reason: HOSPADM

## 2023-01-18 RX ORDER — HEPARIN SODIUM (PORCINE) LOCK FLUSH IV SOLN 100 UNIT/ML 100 UNIT/ML
100 SOLUTION INTRAVENOUS PRN
Status: DISCONTINUED | OUTPATIENT
Start: 2023-01-18 | End: 2023-01-21 | Stop reason: HOSPADM

## 2023-01-18 RX ORDER — SODIUM CHLORIDE 9 MG/ML
INJECTION, SOLUTION INTRAVENOUS CONTINUOUS
Status: DISCONTINUED | OUTPATIENT
Start: 2023-01-18 | End: 2023-01-20

## 2023-01-18 RX ORDER — ALBUTEROL SULFATE 2.5 MG/3ML
SOLUTION RESPIRATORY (INHALATION) EVERY 4 HOURS PRN
Status: DISCONTINUED | OUTPATIENT
Start: 2023-01-18 | End: 2023-01-21 | Stop reason: HOSPADM

## 2023-01-18 RX ORDER — ENOXAPARIN SODIUM 100 MG/ML
40 INJECTION SUBCUTANEOUS DAILY
Status: DISCONTINUED | OUTPATIENT
Start: 2023-01-18 | End: 2023-01-21 | Stop reason: HOSPADM

## 2023-01-18 RX ORDER — DEXTROSE MONOHYDRATE 100 MG/ML
INJECTION, SOLUTION INTRAVENOUS CONTINUOUS PRN
Status: DISCONTINUED | OUTPATIENT
Start: 2023-01-18 | End: 2023-01-21 | Stop reason: HOSPADM

## 2023-01-18 RX ORDER — INSULIN LISPRO 100 [IU]/ML
0-4 INJECTION, SOLUTION INTRAVENOUS; SUBCUTANEOUS EVERY 4 HOURS
Status: DISCONTINUED | OUTPATIENT
Start: 2023-01-18 | End: 2023-01-21 | Stop reason: HOSPADM

## 2023-01-18 RX ORDER — KETOROLAC TROMETHAMINE 30 MG/ML
30 INJECTION, SOLUTION INTRAMUSCULAR; INTRAVENOUS ONCE
Status: COMPLETED | OUTPATIENT
Start: 2023-01-18 | End: 2023-01-18

## 2023-01-18 RX ORDER — ACETAMINOPHEN 325 MG/1
650 TABLET ORAL EVERY 4 HOURS PRN
Status: DISCONTINUED | OUTPATIENT
Start: 2023-01-18 | End: 2023-01-18 | Stop reason: SDUPTHER

## 2023-01-18 RX ORDER — METRONIDAZOLE 500 MG/1
500 TABLET ORAL EVERY 8 HOURS SCHEDULED
Status: DISCONTINUED | OUTPATIENT
Start: 2023-01-18 | End: 2023-01-20

## 2023-01-18 RX ORDER — ONDANSETRON 8 MG/1
8 TABLET, ORALLY DISINTEGRATING ORAL 3 TIMES DAILY
COMMUNITY

## 2023-01-18 RX ORDER — PANTOPRAZOLE SODIUM 40 MG/1
40 TABLET, DELAYED RELEASE ORAL 2 TIMES DAILY
COMMUNITY

## 2023-01-18 RX ORDER — OLANZAPINE 5 MG/1
5 TABLET ORAL NIGHTLY
Status: DISCONTINUED | OUTPATIENT
Start: 2023-01-18 | End: 2023-01-21 | Stop reason: HOSPADM

## 2023-01-18 RX ORDER — CALCIUM CARBONATE 200(500)MG
2 TABLET,CHEWABLE ORAL 2 TIMES DAILY PRN
Status: DISCONTINUED | OUTPATIENT
Start: 2023-01-18 | End: 2023-01-21 | Stop reason: HOSPADM

## 2023-01-18 RX ORDER — METRONIDAZOLE 500 MG/100ML
500 INJECTION, SOLUTION INTRAVENOUS ONCE
Status: COMPLETED | OUTPATIENT
Start: 2023-01-18 | End: 2023-01-18

## 2023-01-18 RX ORDER — METRONIDAZOLE 500 MG/100ML
500 INJECTION, SOLUTION INTRAVENOUS EVERY 8 HOURS
Status: DISCONTINUED | OUTPATIENT
Start: 2023-01-18 | End: 2023-01-18

## 2023-01-18 RX ORDER — AMLODIPINE BESYLATE 5 MG/1
5 TABLET ORAL DAILY
Status: DISCONTINUED | OUTPATIENT
Start: 2023-01-18 | End: 2023-01-19

## 2023-01-18 RX ORDER — SODIUM CHLORIDE 9 MG/ML
INJECTION, SOLUTION INTRAVENOUS PRN
Status: DISCONTINUED | OUTPATIENT
Start: 2023-01-18 | End: 2023-01-21 | Stop reason: HOSPADM

## 2023-01-18 RX ORDER — MORPHINE SULFATE 2 MG/ML
2 INJECTION, SOLUTION INTRAMUSCULAR; INTRAVENOUS
Status: DISCONTINUED | OUTPATIENT
Start: 2023-01-18 | End: 2023-01-20

## 2023-01-18 RX ORDER — VITAMIN B COMPLEX
1000 TABLET ORAL DAILY
Status: DISCONTINUED | OUTPATIENT
Start: 2023-01-18 | End: 2023-01-21 | Stop reason: HOSPADM

## 2023-01-18 RX ORDER — SODIUM CHLORIDE 0.9 % (FLUSH) 0.9 %
5-40 SYRINGE (ML) INJECTION EVERY 12 HOURS SCHEDULED
Status: DISCONTINUED | OUTPATIENT
Start: 2023-01-18 | End: 2023-01-21 | Stop reason: HOSPADM

## 2023-01-18 RX ORDER — MORPHINE SULFATE 4 MG/ML
4 INJECTION, SOLUTION INTRAMUSCULAR; INTRAVENOUS
Status: DISCONTINUED | OUTPATIENT
Start: 2023-01-18 | End: 2023-01-20

## 2023-01-18 RX ORDER — SODIUM CHLORIDE 0.9 % (FLUSH) 0.9 %
5-40 SYRINGE (ML) INJECTION PRN
Status: DISCONTINUED | OUTPATIENT
Start: 2023-01-18 | End: 2023-01-18

## 2023-01-18 RX ADMIN — METRONIDAZOLE 500 MG: 500 TABLET ORAL at 17:27

## 2023-01-18 RX ADMIN — OLANZAPINE 5 MG: 5 TABLET, FILM COATED ORAL at 20:21

## 2023-01-18 RX ADMIN — SODIUM CHLORIDE, PRESERVATIVE FREE 10 ML: 5 INJECTION INTRAVENOUS at 14:51

## 2023-01-18 RX ADMIN — Medication 10 ML: at 20:23

## 2023-01-18 RX ADMIN — Medication 1000 UNITS: at 12:01

## 2023-01-18 RX ADMIN — KETOROLAC TROMETHAMINE 30 MG: 30 INJECTION, SOLUTION INTRAMUSCULAR; INTRAVENOUS at 02:10

## 2023-01-18 RX ADMIN — SODIUM CHLORIDE: 9 INJECTION, SOLUTION INTRAVENOUS at 09:50

## 2023-01-18 RX ADMIN — CEFEPIME 2000 MG: 2 INJECTION, POWDER, FOR SOLUTION INTRAVENOUS at 10:06

## 2023-01-18 RX ADMIN — MORPHINE SULFATE 4 MG: 4 INJECTION, SOLUTION INTRAMUSCULAR; INTRAVENOUS at 21:20

## 2023-01-18 RX ADMIN — METRONIDAZOLE 500 MG: 500 TABLET ORAL at 22:42

## 2023-01-18 RX ADMIN — ACETAMINOPHEN 650 MG: 325 TABLET ORAL at 21:19

## 2023-01-18 RX ADMIN — CEFEPIME 2000 MG: 2 INJECTION, POWDER, FOR SOLUTION INTRAVENOUS at 00:42

## 2023-01-18 RX ADMIN — METRONIDAZOLE 500 MG: 500 INJECTION, SOLUTION INTRAVENOUS at 10:08

## 2023-01-18 RX ADMIN — SODIUM CHLORIDE: 9 INJECTION, SOLUTION INTRAVENOUS at 17:30

## 2023-01-18 RX ADMIN — Medication 10 ML: at 09:50

## 2023-01-18 RX ADMIN — METRONIDAZOLE 500 MG: 500 INJECTION, SOLUTION INTRAVENOUS at 02:09

## 2023-01-18 RX ADMIN — CEFTRIAXONE 2000 MG: 2 INJECTION, POWDER, FOR SOLUTION INTRAMUSCULAR; INTRAVENOUS at 17:27

## 2023-01-18 RX ADMIN — CALCIUM GLUCONATE 2000 MG: 98 INJECTION, SOLUTION INTRAVENOUS at 20:30

## 2023-01-18 RX ADMIN — MORPHINE SULFATE 4 MG: 4 INJECTION, SOLUTION INTRAMUSCULAR; INTRAVENOUS at 14:50

## 2023-01-18 ASSESSMENT — PAIN SCALES - GENERAL
PAINLEVEL_OUTOF10: 8
PAINLEVEL_OUTOF10: 8
PAINLEVEL_OUTOF10: 9
PAINLEVEL_OUTOF10: 5
PAINLEVEL_OUTOF10: 3
PAINLEVEL_OUTOF10: 0

## 2023-01-18 ASSESSMENT — PAIN DESCRIPTION - DESCRIPTORS
DESCRIPTORS: SORE;ACHING;DISCOMFORT
DESCRIPTORS: ACHING;CRAMPING
DESCRIPTORS: ACHING

## 2023-01-18 ASSESSMENT — PAIN DESCRIPTION - LOCATION
LOCATION: ABDOMEN

## 2023-01-18 ASSESSMENT — PAIN SCALES - WONG BAKER: WONGBAKER_NUMERICALRESPONSE: 2

## 2023-01-18 ASSESSMENT — PAIN - FUNCTIONAL ASSESSMENT: PAIN_FUNCTIONAL_ASSESSMENT: PREVENTS OR INTERFERES SOME ACTIVE ACTIVITIES AND ADLS

## 2023-01-18 ASSESSMENT — PAIN DESCRIPTION - ORIENTATION: ORIENTATION: MID;LOWER

## 2023-01-18 NOTE — H&P
[facility]  History and Physical      CHIEF COMPLAINT:    Chief Complaint   Patient presents with    Abdominal Pain     C/o diffuse abd pain and pain across b/l shoulders. ongoing        Patient of Blessing Gaona DO presents with:  Sepsis ([facility] [address]    History of Present Illness:   Patient is a 51-year-old female with a past medical history of hyperlipidemia, hypertension, prediabetes, gastric bypass 9/2019 and recent diagnosis of lung CA with mets to the liver bone. Patient was started on Octreotide and follows at the [facility] for her cancer diagnosis. Patient presented to [facility] ED with complaints of abdominal pain. Patient has had chronic abdominal pain however this is worsened over the last day or 2. Patient states that she has some pain going to her back between her shoulder blades. Patient is under palliative care with [facility] for symptom management. Patient complained of diarrhea. Patient denies any smoking, alcohol, illicit drugs. Patient denies any chest pain, shortness of breath, headache dizziness and blurred vision. ER work-up revealed elevated WBC 13.7, elevated liver enzymes. Decision was made to transfer patient to [facility] for admission to telemetry unit for further testing and treatment. REVIEW OF SYSTEMS:  Pertinent negatives are above in HPI. 10 point ROS otherwise negative.       Past Medical History:   Diagnosis Date    Cancer ([facility] [address]     Hyperlipidemia     Hypertension     Prediabetes          Past Surgical History:   Procedure Laterality Date    COLONOSCOPY  01/09/2018    few diverticula--eugenia    CT BIOPSY PERCUTANEOUS SUPERFICIAL BONE  11/10/2022    CT BIOPSY PERCUTANEOUS SUPERFICIAL BONE 11/10/2022 [facility]    CT NEEDLE BIOPSY LIVER PERCUTANEOUS  12/8/2022    CT NEEDLE BIOPSY LIVER PERCUTANEOUS 12/8/2022 Saud Sommer MD [facility] CT    GASTRIC BYPASS SURGERY  09/2019    UPPER GASTROINTESTINAL ENDOSCOPY  01/09/2018    mild gastritis; small hiatal hernia--eugenia       Medications Prior to Admission:    Medications Prior to Admission: ondansetron (ZOFRAN-ODT) 8 MG TBDP disintegrating tablet, Place 8 mg under the tongue in the morning, at noon, and at bedtime  pantoprazole (PROTONIX) 40 MG tablet, Take 40 mg by mouth in the morning and at bedtime  Calcium Carbonate Antacid (CALCIUM CARBONATE PO), Take 2 tablets by mouth 2 times daily as needed  vitamin D3 (CHOLECALCIFEROL) 25 MCG (1000 UT) TABS tablet, Take 1,000 Units by mouth  OLANZapine (ZYPREXA) 5 MG tablet, Take 5 mg by mouth nightly  fluticasone (FLONASE) 50 MCG/ACT nasal spray, 1 spray by Each Nostril route in the morning and at bedtime  [DISCONTINUED] methylPREDNISolone (MEDROL DOSEPACK) 4 MG tablet, Take by mouth.   [DISCONTINUED] ondansetron (ZOFRAN-ODT) 4 MG disintegrating tablet, Take 1 tablet by mouth 3 times daily as needed for Nausea or Vomiting (Patient taking differently: Take 8 mg by mouth 3 times daily as needed for Nausea or Vomiting)  famotidine (PEPCID) 20 MG tablet, Take 1 tablet by mouth 2 times daily  [DISCONTINUED] pantoprazole (PROTONIX) 40 MG tablet, Take 1 tablet by mouth daily (with breakfast)  [DISCONTINUED] sucralfate (CARAFATE) 1 GM tablet, Take 1 tablet by mouth 4 times daily  [DISCONTINUED] clotrimazole-betamethasone (LOTRISONE) 1-0.05 % cream, clotrimazole-betamethasone 1 %-0.05 % topical cream  [DISCONTINUED] ammonium lactate (LAC-HYDRIN) 12 % lotion, ammonium lactate 12 % lotion  [DISCONTINUED] benzonatate (TESSALON) 100 MG capsule, benzonatate 100 mg capsule  [DISCONTINUED] albuterol sulfate  (90 Base) MCG/ACT inhaler, albuterol sulfate HFA 90 mcg/actuation aerosol inhaler  [DISCONTINUED] Lancets (ONETOUCH DELICA PLUS BATCBP76R) MISC, use twice a day  [DISCONTINUED] amLODIPine (NORVASC) 5 MG tablet, amlodipine 5 mg tablet  [DISCONTINUED] blood glucose test strips (EXACTECH TEST) strip, FreeStyle Lite Strips    Note that the patient's home medications were reviewed and the above list is accurate to the best of my knowledge at the time of the exam.    Allergies:    Patient has no known allergies. Social History:    reports that she quit smoking about 33 years ago. Her smoking use included cigarettes. She started smoking about 45 years ago. She has never used smokeless tobacco. She reports that she does not drink alcohol and does not use drugs. Family History:   family history includes Heart Attack in her maternal grandmother and paternal grandmother; Prostate Cancer in her paternal grandfather; Stroke in her maternal grandmother and paternal grandmother. PHYSICAL EXAM:    Vitals:  /66   Pulse 91   Temp 97.9 °F (36.6 °C) (Oral)   Resp 16   Wt 145 lb (65.8 kg)   LMP 01/10/2013   SpO2 98%   BMI 28.32 kg/m²       General appearance: NAD, conversant, ill-appearing  Eyes: Sclerae anicteric, PERRLA  HEENT: AT/NC, MMM  Neck: FROM, supple, no thyromegaly  Lymph: No cervical / supraclavicular lymphadenopathy  Lungs: Clear to auscultation, WOB normal  CV: RRR, no MRGs, no lower extremity edema  Abdomen: Soft, tender to palpation; no masses or HSM, +BS  Extremities: FROM without synovitis. No clubbing or cyanosis of the hands. Skin: no rash, induration, lesions, or ulcers  Psych: Calm and cooperative. Normal judgement and insight. Normal mood and affect. Neuro: Alert and interactive, face symmetric, speech fluent. LABS:  All labs reviewed.   Of note:  CBC with Differential:    Lab Results   Component Value Date/Time    WBC 13.7 01/17/2023 07:30 PM    RBC 4.54 01/17/2023 07:30 PM    HGB 10.7 01/17/2023 07:30 PM    HCT 32.1 01/17/2023 07:30 PM     01/17/2023 07:30 PM    MCV 70.7 01/17/2023 07:30 PM    MCH 23.6 01/17/2023 07:30 PM    MCHC 33.3 01/17/2023 07:30 PM    RDW 16.1 01/17/2023 07:30 PM    SEGSPCT 55 01/17/2013 10:52 AM    BANDSPCT 1 07/23/2015 10:10 PM    LYMPHOPCT 7.3 01/17/2023 07:30 PM    MONOPCT 10.8 01/17/2023 07:30 PM    BASOPCT 0.3 01/17/2023 07:30 PM    MONOSABS 1.48 01/17/2023 07:30 PM    LYMPHSABS 1.00 01/17/2023 07:30 PM    EOSABS 0.00 01/17/2023 07:30 PM    BASOSABS 0.04 01/17/2023 07:30 PM     CMP:    Lab Results   Component Value Date/Time     01/18/2023 10:17 AM    K 4.2 01/18/2023 10:17 AM     01/18/2023 10:17 AM    CO2 20 01/18/2023 10:17 AM    BUN 13 01/18/2023 10:17 AM    CREATININE 0.6 01/18/2023 10:17 AM    GFRAA >60 12/18/2021 04:46 PM    LABGLOM >60 01/18/2023 10:17 AM    GLUCOSE 76 01/18/2023 10:17 AM    GLUCOSE 92 01/18/2012 12:30 PM    PROT 5.2 01/18/2023 10:17 AM    LABALBU 1.9 01/18/2023 10:17 AM    LABALBU 4.5 01/18/2012 12:30 PM    CALCIUM 6.3 01/18/2023 10:17 AM    BILITOT 0.5 01/18/2023 10:17 AM    ALKPHOS 312 01/18/2023 10:17 AM    AST 64 01/18/2023 10:17 AM    ALT 32 01/18/2023 10:17 AM       Imaging:  CXR: No acute process    CTA pulmonary: Within and exam limitations there is no convincing evidence for acute PE. Stable presumed subsegmental nodule in the right lung. Metastatic disease of the liver and visualized osseous structures as detailed above. Diffuse thyromegaly. CTA abdomen pelvis: Stable hepatomegaly and multiple liver mets. Diffuse osteoblastic mets. Cholecystectomy. Dilated common bile duct. Perigastric postoperative changes. EKG:  I've personally reviewed the patient's EKG:  Sinus tachycardia    Telemetry:  I've personally reviewed the patient's telemetry:  ST    ASSESSMENT/PLAN:  Principal Problem:    Sepsis ([address]  Resolved Problems:    * No resolved hospital problems. *    74-year-old female with an extensive history of lung cancer with mets to the bone and liver presents to the ED with abdominal pain and is admitted to telemetry unit with    Sepsis of unknown etiology  Monitor labs-WBC 13.7  IV Rocephin/Flagyl  Pain control  IV hydration  ID is following  C.  Difficile  - pending  Blood cultures pending    Medication for other comorbidities continue as appropriate dose adjustment as necessary. DVT prophylaxis  PT OT  Discharge planning  Case discussed with attending and agreed upon plan of care. Code status: Full  Requires inpatient level of care  MAXIMUS Robertson CNP    11:45 AM  1/18/2023     Above note edited to reflect my thoughts   Review of patient's medical record from clinic  Patient is a poor historian  Continue antibiotic treatment at discretion of ID  Once optimized from infectious standpoint, She can be discharged to follow-up with her primary allergy team in [place]    I personally saw, examined and provided care for the patient. Radiographs, labs and medication list were reviewed by me independently. The case was discussed in detail and plans for care were established. Review of Araceli WRIGHT CNP, documentation was conducted and revisions were made as appropriate directly by me. I agree with the above documented exam, problem list, and plan of care.      Azar Szymanski MD  9:57 PM  1/18/2023

## 2023-01-18 NOTE — ED PROVIDER NOTES
This is a 82-year-old female with a past medical history of metastatic cancer who presents to the ED for evaluation of abdominal pain. Patient states that at her baseline due to her metastatic disease she has pain phonically to her neck back and abdomen. Patient states that over the past several days her pain is worsening states that it is in her lower abdomen. Patient all states that she has some pain going to her back and between her shoulder blades. Patient denies any saddle anesthesia or incontinence. Patient is under the care of palliative. Patient has no new traumas or falls or fevers or chills. There are no other reported mitigating or exacerbating factors. The history is provided by the patient. Review of Systems   Constitutional:  Negative for fever. Eyes:  Negative for visual disturbance. Respiratory:  Negative for cough and shortness of breath. Cardiovascular:  Negative for chest pain. Gastrointestinal:  Negative for abdominal pain. Endocrine: Negative for polyuria. Genitourinary:  Negative for dysuria. Musculoskeletal:  Positive for back pain. Skin:  Negative for rash. Allergic/Immunologic: Positive for immunocompromised state. Neurological:  Negative for headaches. Psychiatric/Behavioral:  Negative for confusion. Physical Exam  Vitals and nursing note reviewed. Constitutional:       General: She is not in acute distress. Appearance: She is well-developed. HENT:      Head: Normocephalic and atraumatic. Mouth/Throat:      Mouth: Mucous membranes are dry. Eyes:      Extraocular Movements: Extraocular movements intact. Pupils: Pupils are equal, round, and reactive to light. Neck:      Vascular: No JVD. Cardiovascular:      Rate and Rhythm: Regular rhythm. Tachycardia present. Heart sounds: No murmur heard. Pulmonary:      Effort: Pulmonary effort is normal.      Breath sounds: No wheezing, rhonchi or rales.    Chest:      Chest wall: No tenderness. Abdominal:      Palpations: Abdomen is soft. Tenderness: There is no guarding or rebound. Hernia: No hernia is present. Comments: Slightly distended, tendernesss along lower abdomen   Musculoskeletal:      Cervical back: Normal range of motion and neck supple. Right lower leg: No edema. Left lower leg: No edema. Skin:     General: Skin is warm and dry. Capillary Refill: Capillary refill takes less than 2 seconds. Neurological:      General: No focal deficit present. Mental Status: She is alert and oriented to person, place, and time. Cranial Nerves: No cranial nerve deficit. Psychiatric:         Mood and Affect: Mood normal.         Behavior: Behavior normal.        Procedures     MDM  Number of Diagnoses or Management Options  Abdominal pain, unspecified abdominal location  Malignant neoplasm metastatic to other site ([facility] [address]  Septicemia ([facility] [address]  Diagnosis management comments: Patient is a pleasant 81-year-old female who presented to the ED for ongoing lower abdominal pain patient is a palliative care patient has metastatic cancer in the department she did appear profoundly dehydrated was treated with IV fluids antipyretics and multiple dose of analgesics. While patient did admit that she has some pain between her shoulder blades states this is quite frequent and common for her due to her metastatic disease. Patient had a leukocytosis in setting of diarrhea and lower abdominal pain on the differential was c diff and culutres were ordered. Patient had no red flag symptoms for her back pain such as saddle anesthesia or incontinence concerning right upper cord compression this point time.   Given the patient's fever leukocytosis and obvious signs of infection I did feel it was necessary to meet the patient for further evaluation and monitoring patient was started on broad-spectrum antibiotics and admitted to the medicine team for further evaluation monitoring                 Differential diagnosis: SBO, PE, Perforation, UTI, Bone Pain  Review of ED/ Outpatient Records: Pallative Care notes  Historians that case was discussed with: None  Imaging interpretation by myself: CXR showed no effusion, opacties or PTX  Independent Interpretation of labs: Elevation in Alk Phos and LFTs  Discussed with other providers: None  Tests Considered but not ordered: None  Decision making tools/risks stratification: None  Disposition decision making/shared decision making: Shared  Chronic Conditions affecting care: Mets  Social Determinants of health impacting treatment or disposition: None  CODE status and Discussions: Full    ED Course as of 01/18/23 0544 Wed Jan 18, 2023 0058 Spoke with Yesica Garcia who agreed to accept for SAURABH [BP]   0346 EKG: This EKG is signed and interpreted by me. Rate: 119  Rhythm: Sinus  Interpretation: sinus tachycardia, Left Atrial Enlargement, HR at 119, Qtc at 441, Low voltage QRS  Comparison: no previous EKG  [BP]      ED Course User Index  [BP] Sravanthi De Anda, DO             ED Course as of 01/18/23 0544 Wed Jan 18, 2023 0058 Spoke with Yesica Garcia who agreed to accept for SAURABH [BP]   0346 EKG: This EKG is signed and interpreted by me. Rate: 119  Rhythm: Sinus  Interpretation: sinus tachycardia, Left Atrial Enlargement, HR at 119, Qtc at 441, Low voltage QRS  Comparison: no previous EKG  [BP]      ED Course User Index  [BP] Sravanthi De Anda, DO       --------------------------------------------- PAST HISTORY ---------------------------------------------  Past Medical History:  has a past medical history of Cancer ([facility] [address] Hyperlipidemia, Hypertension, and Prediabetes. Past Surgical History:  has a past surgical history that includes Upper gastrointestinal endoscopy (01/09/2018); Colonoscopy (01/09/2018);  Gastric bypass surgery (09/2019); CT BIOPSY SUPERFICIAL BONE PERCUTANEOUS (11/10/2022); and CT NEEDLE BIOPSY LIVER PERCUTANEOUS (12/8/2022). Social History:  reports that she quit smoking about 33 years ago. Her smoking use included cigarettes. She started smoking about 45 years ago. She has never used smokeless tobacco. She reports that she does not drink alcohol and does not use drugs. Family History: family history includes Heart Attack in her maternal grandmother and paternal grandmother; Prostate Cancer in her paternal grandfather; Stroke in her maternal grandmother and paternal grandmother. The patients home medications have been reviewed. Allergies: Patient has no known allergies.     -------------------------------------------------- RESULTS -------------------------------------------------  Labs:  Results for orders placed or performed during the hospital encounter of 01/17/23   COVID-19 & Influenza Combo    Specimen: Nasopharyngeal Swab   Result Value Ref Range    SARS-CoV-2 RNA, RT PCR NOT DETECTED NOT DETECTED    INFLUENZA A NOT DETECTED NOT DETECTED    INFLUENZA B NOT DETECTED NOT DETECTED   CBC with Auto Differential   Result Value Ref Range    WBC 13.7 (H) 4.5 - 11.5 E9/L    RBC 4.54 3.50 - 5.50 E12/L    Hemoglobin 10.7 (L) 11.5 - 15.5 g/dL    Hematocrit 32.1 (L) 34.0 - 48.0 %    MCV 70.7 (L) 80.0 - 99.9 fL    MCH 23.6 (L) 26.0 - 35.0 pg    MCHC 33.3 32.0 - 34.5 %    RDW 16.1 (H) 11.5 - 15.0 fL    Platelets 676 (H) 154 - 450 E9/L    MPV 10.2 7.0 - 12.0 fL    Neutrophils % 80.9 (H) 43.0 - 80.0 %    Immature Granulocytes % 0.7 0.0 - 5.0 %    Lymphocytes % 7.3 (L) 20.0 - 42.0 %    Monocytes % 10.8 2.0 - 12.0 %    Eosinophils % 0.0 0.0 - 6.0 %    Basophils % 0.3 0.0 - 2.0 %    Neutrophils Absolute 11.09 (H) 1.80 - 7.30 E9/L    Immature Granulocytes # 0.10 E9/L    Lymphocytes Absolute 1.00 (L) 1.50 - 4.00 E9/L    Monocytes Absolute 1.48 (H) 0.10 - 0.95 E9/L    Eosinophils Absolute 0.00 (L) 0.05 - 0.50 E9/L    Basophils Absolute 0.04 0.00 - 0.20 E9/L   Comprehensive Metabolic Panel   Result Value Ref Range    Sodium 134 132 - 146 mmol/L    Potassium 5.5 (H) 3.5 - 5.0 mmol/L    Chloride 101 98 - 107 mmol/L    CO2 18 (L) 22 - 29 mmol/L    Anion Gap 15 7 - 16 mmol/L    Glucose 100 (H) 74 - 99 mg/dL    BUN 13 6 - 23 mg/dL    Creatinine 0.7 0.5 - 1.0 mg/dL    Est, Glom Filt Rate >60 >=60 mL/min/1.73    Calcium 7.1 (L) 8.6 - 10.2 mg/dL    Total Protein 6.7 6.4 - 8.3 g/dL    Albumin 2.3 (L) 3.5 - 5.2 g/dL    Total Bilirubin 0.6 0.0 - 1.2 mg/dL    Alkaline Phosphatase 394 (H) 35 - 104 U/L    ALT 39 (H) 0 - 32 U/L     (H) 0 - 31 U/L   Magnesium   Result Value Ref Range    Magnesium 1.6 1.6 - 2.6 mg/dL   Lactic Acid   Result Value Ref Range    Lactic Acid 1.9 0.5 - 2.2 mmol/L   Urinalysis   Result Value Ref Range    Color, UA Yellow Straw/Yellow    Clarity, UA Clear Clear    Glucose, Ur Negative Negative mg/dL    Bilirubin Urine Negative Negative    Ketones, Urine Negative Negative mg/dL    Specific Gravity, UA 1.015 1.005 - 1.030    Blood, Urine Negative Negative    pH, UA 5.0 5.0 - 9.0    Protein, UA TRACE Negative mg/dL    Urobilinogen, Urine 0.2 <2.0 E.U./dL    Nitrite, Urine Negative Negative    Leukocyte Esterase, Urine Negative Negative   Lipase   Result Value Ref Range    Lipase 15 13 - 60 U/L   SPECIMEN REJECTION   Result Value Ref Range    Rejected Test trop     Reason for Rejection see below    Troponin   Result Value Ref Range    Troponin, High Sensitivity 6 0 - 9 ng/L   Microscopic Urinalysis   Result Value Ref Range    WBC, UA 0-1 0 - 5 /HPF    RBC, UA NONE 0 - 2 /HPF    Epithelial Cells, UA FEW /HPF    Bacteria, UA RARE (A) None Seen /HPF       Radiology:  CTA PULMONARY W CONTRAST   Final Result   Within exam limitations, there is no convincing evidence for acute pulmonary   thromboembolism. Stable presumed subcentimeter nodule in the right lung, as detailed above. Metastatic disease of the liver and visualized osseous structures, as   detailed above. Diffuse thyromegaly.       RECOMMENDATIONS: Unavailable         CT ABDOMEN PELVIS W IV CONTRAST Additional Contrast? None   Final Result   Stable hepatomegaly and multiple liver metastases. Diffuse osteoblastic metastases. Cholecystectomy. Dilated common bile duct. Perigastric postoperative changes. XR CHEST (2 VW)   Final Result   No acute process. ------------------------- NURSING NOTES AND VITALS REVIEWED ---------------------------  Date / Time Roomed:  1/17/2023  6:05 PM  ED Bed Assignment:  05/05    ED Course as of 01/18/23 0544   Wed Jan 18, 2023   0058 Spoke with Merari Mcclure who agreed to accept for SAURABH [BP]   0346 EKG: This EKG is signed and interpreted by me. Rate: 119  Rhythm: Sinus  Interpretation: sinus tachycardia, Left Atrial Enlargement, HR at 119, Qtc at 441, Low voltage QRS  Comparison: no previous EKG  [BP]      ED Course User Index  [BP] Harmeet Wan DO       --------------------------------------------- PAST HISTORY ---------------------------------------------  Past Medical History:  has a past medical history of Cancer ([facility] [address] Hyperlipidemia, Hypertension, and Prediabetes. Past Surgical History:  has a past surgical history that includes Upper gastrointestinal endoscopy (01/09/2018); Colonoscopy (01/09/2018); Gastric bypass surgery (09/2019); CT BIOPSY SUPERFICIAL BONE PERCUTANEOUS (11/10/2022); and CT NEEDLE BIOPSY LIVER PERCUTANEOUS (12/8/2022). Social History:  reports that she quit smoking about 33 years ago. Her smoking use included cigarettes. She started smoking about 45 years ago. She has never used smokeless tobacco. She reports that she does not drink alcohol and does not use drugs. Family History: family history includes Heart Attack in her maternal grandmother and paternal grandmother; Prostate Cancer in her paternal grandfather; Stroke in her maternal grandmother and paternal grandmother. The patients home medications have been reviewed.     Allergies: Patient has no known allergies.     -------------------------------------------------- RESULTS -------------------------------------------------    LABS:  Results for orders placed or performed during the hospital encounter of 01/17/23   COVID-19 & Influenza Combo    Specimen: Nasopharyngeal Swab   Result Value Ref Range    SARS-CoV-2 RNA, RT PCR NOT DETECTED NOT DETECTED    INFLUENZA A NOT DETECTED NOT DETECTED    INFLUENZA B NOT DETECTED NOT DETECTED   CBC with Auto Differential   Result Value Ref Range    WBC 13.7 (H) 4.5 - 11.5 E9/L    RBC 4.54 3.50 - 5.50 E12/L    Hemoglobin 10.7 (L) 11.5 - 15.5 g/dL    Hematocrit 32.1 (L) 34.0 - 48.0 %    MCV 70.7 (L) 80.0 - 99.9 fL    MCH 23.6 (L) 26.0 - 35.0 pg    MCHC 33.3 32.0 - 34.5 %    RDW 16.1 (H) 11.5 - 15.0 fL    Platelets 346 (H) 283 - 450 E9/L    MPV 10.2 7.0 - 12.0 fL    Neutrophils % 80.9 (H) 43.0 - 80.0 %    Immature Granulocytes % 0.7 0.0 - 5.0 %    Lymphocytes % 7.3 (L) 20.0 - 42.0 %    Monocytes % 10.8 2.0 - 12.0 %    Eosinophils % 0.0 0.0 - 6.0 %    Basophils % 0.3 0.0 - 2.0 %    Neutrophils Absolute 11.09 (H) 1.80 - 7.30 E9/L    Immature Granulocytes # 0.10 E9/L    Lymphocytes Absolute 1.00 (L) 1.50 - 4.00 E9/L    Monocytes Absolute 1.48 (H) 0.10 - 0.95 E9/L    Eosinophils Absolute 0.00 (L) 0.05 - 0.50 E9/L    Basophils Absolute 0.04 0.00 - 0.20 E9/L   Comprehensive Metabolic Panel   Result Value Ref Range    Sodium 134 132 - 146 mmol/L    Potassium 5.5 (H) 3.5 - 5.0 mmol/L    Chloride 101 98 - 107 mmol/L    CO2 18 (L) 22 - 29 mmol/L    Anion Gap 15 7 - 16 mmol/L    Glucose 100 (H) 74 - 99 mg/dL    BUN 13 6 - 23 mg/dL    Creatinine 0.7 0.5 - 1.0 mg/dL    Est, Glom Filt Rate >60 >=60 mL/min/1.73    Calcium 7.1 (L) 8.6 - 10.2 mg/dL    Total Protein 6.7 6.4 - 8.3 g/dL    Albumin 2.3 (L) 3.5 - 5.2 g/dL    Total Bilirubin 0.6 0.0 - 1.2 mg/dL    Alkaline Phosphatase 394 (H) 35 - 104 U/L    ALT 39 (H) 0 - 32 U/L     (H) 0 - 31 U/L   Magnesium   Result Value Ref Range Magnesium 1.6 1.6 - 2.6 mg/dL   Lactic Acid   Result Value Ref Range    Lactic Acid 1.9 0.5 - 2.2 mmol/L   Urinalysis   Result Value Ref Range    Color, UA Yellow Straw/Yellow    Clarity, UA Clear Clear    Glucose, Ur Negative Negative mg/dL    Bilirubin Urine Negative Negative    Ketones, Urine Negative Negative mg/dL    Specific Gravity, UA 1.015 1.005 - 1.030    Blood, Urine Negative Negative    pH, UA 5.0 5.0 - 9.0    Protein, UA TRACE Negative mg/dL    Urobilinogen, Urine 0.2 <2.0 E.U./dL    Nitrite, Urine Negative Negative    Leukocyte Esterase, Urine Negative Negative   Lipase   Result Value Ref Range    Lipase 15 13 - 60 U/L   SPECIMEN REJECTION   Result Value Ref Range    Rejected Test trop     Reason for Rejection see below    Troponin   Result Value Ref Range    Troponin, High Sensitivity 6 0 - 9 ng/L   Microscopic Urinalysis   Result Value Ref Range    WBC, UA 0-1 0 - 5 /HPF    RBC, UA NONE 0 - 2 /HPF    Epithelial Cells, UA FEW /HPF    Bacteria, UA RARE (A) None Seen /HPF     Sepsis Re-assessment of Volume Status and Tissue Perfusion  Required for septic shock (persistent hypotension after fluids OR lactic acid  > or = 4.0)    1/18/23   2:20 AM EST          Vital Signs:   Vitals:    01/17/23 1600 01/17/23 1920 01/17/23 2227 01/18/23 0026   BP: 101/69 93/63 (!) 92/51 (!) 103/58   Pulse: (!) 116 (!) 124 (!) 118 (!) 109   Resp: 16 14 16 16   Temp: 98 °F (36.7 °C)  (!) 100.7 °F (38.2 °C) (!) 101.9 °F (38.8 °C)   TempSrc: Oral  Oral Oral   SpO2: 96% 97% 96% 100%   Weight: 145 lb (65.8 kg)        Card/Pulm:  Rhythm: rapid rate. Heart Sounds: Normal S1, S2 and no murmurs, gallops, or rubs. clear to auscultation, no wheezes or rales, and unlabored breathing. Capillary Refill: normal.  Radial Pulse:  equal.  Skin:  Warm.     OR    Any two of the following:  - Measure CVP  - Measure ScvO2  - Bedside cardiovascular ultrasound  - Dynamic assessment of fluid responsiveness with passive leg raise or fluid challenge     RADIOLOGY:  CTA PULMONARY W CONTRAST   Final Result   Within exam limitations, there is no convincing evidence for acute pulmonary   thromboembolism. Stable presumed subcentimeter nodule in the right lung, as detailed above. Metastatic disease of the liver and visualized osseous structures, as   detailed above. Diffuse thyromegaly. RECOMMENDATIONS:   Unavailable         CT ABDOMEN PELVIS W IV CONTRAST Additional Contrast? None   Final Result   Stable hepatomegaly and multiple liver metastases. Diffuse osteoblastic metastases. Cholecystectomy. Dilated common bile duct. Perigastric postoperative changes. XR CHEST (2 VW)   Final Result   No acute process. ------------------------- NURSING NOTES AND VITALS REVIEWED ---------------------------  Date / Time Roomed:  1/17/2023  6:05 PM  ED Bed Assignment:  05/05    The nursing notes within the ED encounter and vital signs as below have been reviewed. Patient Vitals for the past 24 hrs:   BP Temp Temp src Pulse Resp SpO2 Weight   01/18/23 0026 (!) 103/58 (!) 101.9 °F (38.8 °C) Oral (!) 109 16 100 % --   01/17/23 2227 (!) 92/51 (!) 100.7 °F (38.2 °C) Oral (!) 118 16 96 % --   01/17/23 1920 93/63 -- -- (!) 124 14 97 % --   01/17/23 1600 101/69 98 °F (36.7 °C) Oral (!) 116 16 96 % 145 lb (65.8 kg)       Oxygen Saturation Interpretation: Normal    ------------------------------------------ PROGRESS NOTES ------------------------------------------  Re-evaluation(s):  Time: 211  Patients symptoms are improving  Repeat physical examination is improved    Counseling:  I have spoken with the patient and discussed todays results, in addition to providing specific details for the plan of care and counseling regarding the diagnosis and prognosis.   Their questions are answered at this time and they are agreeable with the plan of admission.    --------------------------------- ADDITIONAL PROVIDER NOTES ---------------------------------  Consultations:   Spoke with Devang Cardona Discussed case. They will admit the patient. This patient's ED course included: a personal history and physicial examination, re-evaluation prior to disposition, multiple bedside re-evaluations, IV medications, cardiac monitoring, continuous pulse oximetry, and complex medical decision making and emergency management    This patient has remained hemodynamically stable during their ED course. Diagnosis:  1. Malignant neoplasm metastatic to other site [facility])    2. Abdominal pain, unspecified abdominal location    3. Septicemia ([facility] [address]        Disposition:  Patient's disposition: Admit to telemetry  Patient's condition is stable.         Preston Kenyon DO  01/18/23 2741

## 2023-01-18 NOTE — PROGRESS NOTES
Power midline Placement 1/18/2023    Product number: [id number]   Lot Number: [id number]      Ultrasound: yes   Right Brachial vein:                Upper Arm Circumference: 24 cm    Size: 4.5 fr sl    Exposed Length: 3 cm    Internal Length: 12 cm   Cut: 0 cm   Vein Measurement: 0.56 cm    Jose De Jesus Shahid RN  1/18/2023  1:44 PM

## 2023-01-18 NOTE — PROGRESS NOTES
Date: 2023       Patient Name: John Goldstein  : 1960      MRN: [id number]    Physical Therapy order received and chart reviewed. Per conversation with pt, Pt is independent with all functional mobility. Pt with no skilled acute PT needs at this time. Pt instructed to notify any medical team member if functional status were to change. Will discontinue PT services.      Jorge Romero PT, DPT  [id number]

## 2023-01-18 NOTE — PROGRESS NOTES
[address]    GVRA:                                                  Patient Name: Liz Morley    MRN: [id number]    : 1960    Room: [address] [place] [address]      Evaluating OT: Jigna Diaz OTR/EL, [id number]    Referring MAXIMUS Luu CNP  Specific Provider Orders/Date: OT Eval and Treat 23    Diagnosis: Septicemia ([facility] [address] [A41.9]  Abdominal pain, unspecified abdominal location [R10.9]  Malignant neoplasm metastatic to other site [facility]) [C79.89]  Sepsis ([facility] [address] [A41.9]   Surgery: NA     Pertinent Medical History:      Past Medical History:   Diagnosis Date    Cancer ([facility] [address]     Hyperlipidemia     Hypertension     Prediabetes          Past Surgical History:   Procedure Laterality Date    COLONOSCOPY  2018    few diverticula--eugenia    CT BIOPSY PERCUTANEOUS SUPERFICIAL BONE  11/10/2022    CT BIOPSY PERCUTANEOUS SUPERFICIAL BONE 11/10/2022 [facility]    CT NEEDLE BIOPSY LIVER PERCUTANEOUS  2022    CT NEEDLE BIOPSY LIVER PERCUTANEOUS 2022 Lobito Parker II, MD [facility]    GASTRIC BYPASS SURGERY  2019    UPPER GASTROINTESTINAL ENDOSCOPY  2018    mild gastritis; small hiatal hernia--eugenia     Precautions:  Fall Risk, contact isolation (rule-out c-diff)    Assessment of current deficits    [x] Functional mobility  [x]ADLs  [] Strength               []Cognition    [x] Functional transfers   [x] IADLs         [] Safety Awareness   [x]Endurance    [] Fine Coordination              [x] Balance      [] Vision/perception   [x]Sensation     []Gross Motor Coordination  [] ROM  [] Delirium                   [] Motor Control     OT PLAN OF CARE   OT POC based on physician orders, patient diagnosis and results of clinical assessment    Frequency/Duration 1-3 days/wk for 2 weeks PRN   Specific OT Treatment Interventions to include:   * Instruction/training on adapted ADL techniques and AE recommendations to increase functional independence within precautions       * Training on energy conservation strategies, correct breathing pattern and techniques to improve independence/tolerance for self-care routine  * Functional transfer/mobility training/DME recommendations for increased independence, safety, and fall prevention  * Patient/Family education to increase follow through with safety techniques and functional independence  * Recommendation of environmental modifications for increased safety with functional transfers/mobility and ADLs  * Therapeutic exercise to improve motor endurance, ROM, and functional strength for ADLs/functional transfers  * Therapeutic activities to facilitate/challenge dynamic balance, stand tolerance for increased safety and independence with ADLs  * Therapeutic activities to facilitate gross/fine motor skills for increased independence with ADLs  * Positioning to improve skin integrity, interaction with environment and functional independence  * Delirium prevention/treatment    Recommended Adaptive Equipment: TBD    Comments: Based on patient's functional performance as stated below and level of assistance needed prior to admission, this therapist believes that the patient would benefit from further skilled OT following hospital stay in an effort to increase safety, functional independence, and quality of life. Home Living: Pt lives with  and granddtr in a 2 story with 3 step(s) to enter and B rail(s); bed/bath on second floor. Full bath available on first floor. Bathroom setup: tub shower, no shower  Equipment owned: ww, shower chair    Prior Level of Function: independent with ADLs and with IADLs; using no device for functional mobility. Driving: yes  Occupation: former [employer]    Pain Level: 7-8/10 abdominal pain, addressed with repositioning. RN informed.   Cognition: A&O: 4/4; Follows 3 step directions   Memory: G   Sequencing: G   Problem solving: G   Judgement/safety: G     Functional Assessment: AM-PAC Daily Activity Raw Score: 21/24   Initial Eval Status  Date: 1/18/23 Treatment Status  Date: STGs = LTGs  Time frame: 10-14 days   Feeding independent        Grooming Supervision  To wash hands in standing at sink    Independent    UB Dressing Independent        LB Dressing SBA  To doff/chi socks EOB; c/o some abdominal discomfort    Independent  or mod I with AE PRN   Bathing SBA    Independent    Toileting Independent        Bed Mobility  Rolling: Independent   Supine to sit: supervision  Sit to supine: supervision     Functional Transfers Sit to stand: supervision  Stand to sit: supervision  Independent    Functional Mobility Supervision  With IV pole, for in-room distances  Independent    Balance Sitting:     Static:  Independent     Dynamic:supervision  Standing: supervision     Endurance/Activity Tolerance fair  good   Visual/  Perceptual Glasses: yes, not present   -appears wfl           Hand Dominance R   AROM (PROM) Strength Additional Info:    RUE  WFL 3+/5 good  and wfl FMC/dexterity noted during ADL tasks   LUE WFL 3+/5 good  and wfl FMC/dexterity noted during ADL tasks     Hearing: wfl  Sensation: reports N/T in hands and feet  Tone: WNL  Edema: unremarkable                            Comments: Upon arrival patient supine with HOB slightly elevated. Bed mobility, functional mobility, functional transfers, LB dressing, standing grooming addressed. Patient c/o of abdominal pain and discomfort throughout session with light ADL activities. Forward leaning on sink with completion of sink ADLs. After session, patient long sitting with all devices within reach, all lines and tubes intact. Pt required cues and education as noted above for safe facilitation and completion of tasks. Therapist provided skilled monitoring of patient's response during treatment session.  Prior to and at the end of session, environmental modifications/line management completed for patients safety and efficiency of treatment session. Overall, patient demonstrates mild difficulties with completion of BADLs and IADLs. Factors contributing to these difficulties include abdominal discomfort, decreased endurance, and generalized weakness. As noted above, patient likely to benefit from further OT intervention to increase independence, safety, and overall quality of life. Eval Complexity:   Low  Complexity    Treatment:   Evaluation only    Rehab Potential:  Good for established goals     Patient / Family Goal: None stated      Patient and/or family were instructed on functional diagnosis, prognosis/goals and OT plan of care. Demonstrated good understanding. Eval Complexity: Low      Time In:1419  Time Out: 1435  Total Time:16 minutes    Min Units   OT Eval Low 97165  X 1    OT Eval Medium 34119      OT Eval High 21302       OT Re-Eval [id number]       Therapeutic Ex 26831       Therapeutic Activities 02810       ADL/Self Care 89627      Orthotic Management 13467       Neuro Re-Ed 68307       Non-Billable Time          Evaluation Time additionally includes thorough review of current medical information, gathering information on past medical history/social history and prior level of function, completion of standardized testing/informal observation of tasks, assessment of data and education on plan of care and goals.     Robert Thrasher OTR/L  [id number]

## 2023-01-18 NOTE — PROGRESS NOTES
Infectious disease consult called to to id line waiting call back to see who is assigned to pt.      Dr. Kacey Hernandez will see pt

## 2023-01-18 NOTE — CONSULTS
[facility] CONSULT NOTE    Reason for Consult: Sepsis   Requested by: MAXIMUS Tucker CNP    Chief complaint: Abdominal pain    History Obtained From: Patient and EMR     HISTORY Abhi              The patient is a 66 y.o. 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 On admission, she had fever up to 101.9 °F with leukocytosis of 13,000. SARS-CoV-2 and rapid influenza antigens were negative. CT chest, abdomen and pelvis showed stable subcentimeter nodule in periphery of right upper lobe near the apex and subcentimeter subpleural nodules in the anterior right upper lobe suspicious for metastatic disease, no focal consolidation or pulmonary edema or pleural effusion, stable hepatomegaly and multiple liver metastasis, diffuse osteoblastic metastasis, cholecystectomy with dilated common bile duct. Urinalysis showed insignificant pyuria of 0-1 WBC. Cefepime and metronidazole were started on admission. ID service was subsequently consulted for further recommendations.     Past Medical History  Past Medical History:   Diagnosis Date    Cancer ([facility] [address]     Hyperlipidemia     Hypertension     Prediabetes        Current Facility-Administered Medications   Medication Dose Route Frequency Provider Last Rate Last Admin    albuterol (PROVENTIL) nebulizer solution   Nebulization Q4H PRN MAXIMUS Tucker CNP        amLODIPine (NORVASC) tablet 5 mg  5 mg Oral Daily MAXIMUS Tucker CNP        calcium carbonate (TUMS) chewable tablet 1,000 mg  2 tablet Oral BID PRN MAXIMUS Tucker CNP        Vitamin D (CHOLECALCIFEROL) tablet 1,000 Units  1,000 Units Oral Daily MAXIMUS Tucker CNP        OLANZapine (ZYPREXA) tablet 5 mg  5 mg Oral Nightly Donn Robledo Crystal Postal - CNP        glucose chewable tablet 16 g  4 tablet Oral PRN Courtney Patient, APRN - CNP        dextrose bolus 10% 125 mL  125 mL IntraVENous PRN Courtney Patient, APRN - CNP        Or    dextrose bolus 10% 250 mL  250 mL IntraVENous PRN Courtney Patient, APRN - CNP        glucagon (rDNA) injection 1 mg  1 mg SubCUTAneous PRN Courtney Patient, APRN - CNP        dextrose 10 % infusion   IntraVENous Continuous PRN Courtney Patient, APRN - CNP        sodium chloride flush 0.9 % injection 5-40 mL  5-40 mL IntraVENous 2 times per day Courtney Patient, APRN - CNP   10 mL at 01/18/23 0950    sodium chloride flush 0.9 % injection 5-40 mL  5-40 mL IntraVENous PRN Courtney Patient, APRN - CNP        0.9 % sodium chloride infusion   IntraVENous PRN Courtney Patient, APRN - CNP        enoxaparin (LOVENOX) injection 40 mg  40 mg SubCUTAneous Daily Courtney Patient, APRN - CNP        acetaminophen (TYLENOL) tablet 650 mg  650 mg Oral Q4H PRN Courtney Patient, APRN - CNP        Or    acetaminophen (TYLENOL) suppository 650 mg  650 mg Rectal Q4H PRN Courtney Patient, APRN - CNP        0.9 % sodium chloride infusion   IntraVENous Continuous Courtney Patient, APRN -  mL/hr at 01/18/23 0950 New Bag at 01/18/23 0950    insulin lispro (HUMALOG) injection vial 0-4 Units  0-4 Units SubCUTAneous Q4H Courtney Patient, APRN - CNP        cefepime (MAXIPIME) 2,000 mg in sodium chloride 0.9 % 50 mL IVPB ([id number])  2,000 mg IntraVENous Q8H Courtney Patient, APRN - CNP        metronidazole (FLAGYL) 500 mg in 0.9% NaCl 100 mL IVPB premix  500 mg IntraVENous Q8H Courtney Patient, APRN -  mL/hr at 01/18/23 1008 500 mg at 01/18/23 1008    morphine (PF) injection 2 mg  2 mg IntraVENous Q2H PRN Courtney Patient, APRN - CNP        Or    morphine sulfate (PF) injection 4 mg  4 mg IntraVENous Q2H PRN Courtney Patient, APRN - CNP           No Known Allergies    Surgical History  Past Surgical History:   Procedure Laterality Date    COLONOSCOPY  01/09/2018    few diverticula--eugenia    CT BIOPSY PERCUTANEOUS SUPERFICIAL BONE  11/10/2022    CT BIOPSY PERCUTANEOUS SUPERFICIAL BONE 11/10/2022 [facility]    CT NEEDLE BIOPSY LIVER PERCUTANEOUS  2022    CT NEEDLE BIOPSY LIVER PERCUTANEOUS 2022 Kalina Gay II, MD [facility]    GASTRIC BYPASS SURGERY  2019    UPPER GASTROINTESTINAL ENDOSCOPY  2018    mild gastritis; small hiatal hernia--eugenia        Social History  Social History     Socioeconomic History    Marital status:    Tobacco Use    Smoking status: Former     Years: 10.00     Types: Cigarettes     Start date:      Quit date:      Years since quittin.0    Smokeless tobacco: Never   Vaping Use    Vaping Use: Never used   Substance and Sexual Activity    Alcohol use: No    Drug use: No       Family Medical History  Family History   Problem Relation Age of Onset    Heart Attack Maternal Grandmother     Stroke Maternal Grandmother     Heart Attack Paternal Grandmother     Stroke Paternal Grandmother     Prostate Cancer Paternal Grandfather        Review of Systems:  Constitutional: Had fever, had chills  Eyes: No vision changes, no retroorbital pain  ENT: No hearing changes, no ear pain  Respiratory: No cough, no dyspnea  Cardiovascular: No chest pain, no palpitations  Gastrointestinal: Has abdominal pain, has diarrhea  Genitourinary: No dysuria, no hematuria  Integumentary: No rash, no itching  Musculoskeletal: Has muscle pain, has joint pain  Neurologic: No headache, has numbness in extremities    Physical Examination:  Vitals:    23 0026 23 0607 23 0617 23 0845   BP: (!) 103/58 (!) 93/54  110/66   Pulse: (!) 109 63  91   Resp: 16 16  16   Temp: (!) 101.9 °F (38.8 °C)  97.9 °F (36.6 °C) 97.9 °F (36.6 °C)   TempSrc: Oral  Oral Oral   SpO2: 100% 99%  98%   Weight:         Constitutional: Alert, not in distress  Eyes: Sclerae anicteric, no conjunctival erythema  ENT: No buccal lesion, no pharyngeal exudates  Neck: No nuchal rigidity, no cervical adenopathy  Lungs: Clear breath sounds, no crackles, no wheezes  Heart: Regular rate and rhythm, no murmurs  Abdomen: Bowel sounds present, soft, nontender  Skin: Warm and dry, no active dermatoses  Musculoskeletal: No joint erythema, no joint swelling    Labs, imaging, and medical records/notes were personally reviewed. Assessment:  SIRS, rule out sepsis (fever, leukocytosis) of unclear etiology  Diarrhea  Neuroendocrine tumor with lung, liver and bone metastases    Plan:  Do blood cultures STAT. Follow up stool C difficile toxin assay. Change cefepime to ceftriaxone 2g q24h and continue metronidazole 500 mg q8h. Continue supportive care. Thank you for involving me in the care of Corin Akhtar. I will continue to follow. Please do not hesitate to call for any questions or concerns.     Electronically signed by Darion Gibson MD on 1/18/2023 at 10:47 AM

## 2023-01-19 LAB
ALBUMIN SERPL-MCNC: 2 G/DL (ref 3.5–5.2)
ALP BLD-CCNC: 325 U/L (ref 35–104)
ALT SERPL-CCNC: 29 U/L (ref 0–32)
ANION GAP SERPL CALCULATED.3IONS-SCNC: 11 MMOL/L (ref 7–16)
AST SERPL-CCNC: 47 U/L (ref 0–31)
BASOPHILS ABSOLUTE: 0.04 E9/L (ref 0–0.2)
BASOPHILS RELATIVE PERCENT: 0.4 % (ref 0–2)
BILIRUB SERPL-MCNC: 0.4 MG/DL (ref 0–1.2)
BLOOD CULTURE, ROUTINE: NORMAL
BUN BLDV-MCNC: 7 MG/DL (ref 6–23)
C DIFF TOXIN/ANTIGEN: NORMAL
CALCIUM SERPL-MCNC: 6.4 MG/DL (ref 8.6–10.2)
CHLORIDE BLD-SCNC: 109 MMOL/L (ref 98–107)
CO2: 18 MMOL/L (ref 22–29)
CREAT SERPL-MCNC: 0.5 MG/DL (ref 0.5–1)
CULTURE, BLOOD 2: NORMAL
EOSINOPHILS ABSOLUTE: 0.13 E9/L (ref 0.05–0.5)
EOSINOPHILS RELATIVE PERCENT: 1.2 % (ref 0–6)
GFR SERPL CREATININE-BSD FRML MDRD: >60 ML/MIN/1.73
GLUCOSE BLD-MCNC: 89 MG/DL (ref 74–99)
HCT VFR BLD CALC: 26.1 % (ref 34–48)
HEMOGLOBIN: 8.7 G/DL (ref 11.5–15.5)
IMMATURE GRANULOCYTES #: 0.07 E9/L
IMMATURE GRANULOCYTES %: 0.6 % (ref 0–5)
LYMPHOCYTES ABSOLUTE: 1.32 E9/L (ref 1.5–4)
LYMPHOCYTES RELATIVE PERCENT: 12.1 % (ref 20–42)
MCH RBC QN AUTO: 23.7 PG (ref 26–35)
MCHC RBC AUTO-ENTMCNC: 33.3 % (ref 32–34.5)
MCV RBC AUTO: 71.1 FL (ref 80–99.9)
METER GLUCOSE: 156 MG/DL (ref 74–99)
METER GLUCOSE: 83 MG/DL (ref 74–99)
METER GLUCOSE: 83 MG/DL (ref 74–99)
METER GLUCOSE: 85 MG/DL (ref 74–99)
METER GLUCOSE: 86 MG/DL (ref 74–99)
METER GLUCOSE: 99 MG/DL (ref 74–99)
MONOCYTES ABSOLUTE: 1.11 E9/L (ref 0.1–0.95)
MONOCYTES RELATIVE PERCENT: 10.2 % (ref 2–12)
NEUTROPHILS ABSOLUTE: 8.22 E9/L (ref 1.8–7.3)
NEUTROPHILS RELATIVE PERCENT: 75.5 % (ref 43–80)
PDW BLD-RTO: 16 FL (ref 11.5–15)
PLATELET # BLD: 503 E9/L (ref 130–450)
PMV BLD AUTO: 9.6 FL (ref 7–12)
POTASSIUM REFLEX MAGNESIUM: 3.9 MMOL/L (ref 3.5–5)
RBC # BLD: 3.67 E12/L (ref 3.5–5.5)
SODIUM BLD-SCNC: 138 MMOL/L (ref 132–146)
TOTAL PROTEIN: 4.9 G/DL (ref 6.4–8.3)
WBC # BLD: 10.9 E9/L (ref 4.5–11.5)

## 2023-01-19 PROCEDURE — 6370000000 HC RX 637 (ALT 250 FOR IP): Performed by: FAMILY MEDICINE

## 2023-01-19 PROCEDURE — 6360000002 HC RX W HCPCS: Performed by: INTERNAL MEDICINE

## 2023-01-19 PROCEDURE — 36415 COLL VENOUS BLD VENIPUNCTURE: CPT

## 2023-01-19 PROCEDURE — 80053 COMPREHEN METABOLIC PANEL: CPT

## 2023-01-19 PROCEDURE — 6370000000 HC RX 637 (ALT 250 FOR IP)

## 2023-01-19 PROCEDURE — 2060000000 HC ICU INTERMEDIATE R&B

## 2023-01-19 PROCEDURE — 6370000000 HC RX 637 (ALT 250 FOR IP): Performed by: INTERNAL MEDICINE

## 2023-01-19 PROCEDURE — 2580000003 HC RX 258

## 2023-01-19 PROCEDURE — 82962 GLUCOSE BLOOD TEST: CPT

## 2023-01-19 PROCEDURE — 85025 COMPLETE CBC W/AUTO DIFF WBC: CPT

## 2023-01-19 PROCEDURE — 6360000002 HC RX W HCPCS

## 2023-01-19 PROCEDURE — 2580000003 HC RX 258: Performed by: INTERNAL MEDICINE

## 2023-01-19 PROCEDURE — 6360000002 HC RX W HCPCS: Performed by: FAMILY MEDICINE

## 2023-01-19 RX ORDER — ONDANSETRON 2 MG/ML
4 INJECTION INTRAMUSCULAR; INTRAVENOUS EVERY 6 HOURS PRN
Status: DISCONTINUED | OUTPATIENT
Start: 2023-01-19 | End: 2023-01-21 | Stop reason: HOSPADM

## 2023-01-19 RX ORDER — MEGESTROL ACETATE 40 MG/ML
200 SUSPENSION ORAL DAILY
Status: DISCONTINUED | OUTPATIENT
Start: 2023-01-19 | End: 2023-01-20

## 2023-01-19 RX ADMIN — SODIUM CHLORIDE, PRESERVATIVE FREE 5 ML: 5 INJECTION INTRAVENOUS at 21:53

## 2023-01-19 RX ADMIN — METRONIDAZOLE 500 MG: 500 TABLET ORAL at 05:48

## 2023-01-19 RX ADMIN — OLANZAPINE 5 MG: 5 TABLET, FILM COATED ORAL at 20:41

## 2023-01-19 RX ADMIN — ENOXAPARIN SODIUM 40 MG: 100 INJECTION SUBCUTANEOUS at 08:54

## 2023-01-19 RX ADMIN — CEFTRIAXONE 2000 MG: 2 INJECTION, POWDER, FOR SOLUTION INTRAMUSCULAR; INTRAVENOUS at 16:03

## 2023-01-19 RX ADMIN — METRONIDAZOLE 500 MG: 500 TABLET ORAL at 13:22

## 2023-01-19 RX ADMIN — CALCIUM GLUCONATE 1000 MG: 98 INJECTION, SOLUTION INTRAVENOUS at 20:47

## 2023-01-19 RX ADMIN — MEGESTROL ACETATE 200 MG: 40 SUSPENSION ORAL at 16:26

## 2023-01-19 RX ADMIN — MORPHINE SULFATE 4 MG: 4 INJECTION, SOLUTION INTRAMUSCULAR; INTRAVENOUS at 08:54

## 2023-01-19 RX ADMIN — MORPHINE SULFATE 4 MG: 4 INJECTION, SOLUTION INTRAMUSCULAR; INTRAVENOUS at 13:36

## 2023-01-19 RX ADMIN — Medication 10 ML: at 20:50

## 2023-01-19 RX ADMIN — METRONIDAZOLE 500 MG: 500 TABLET ORAL at 21:54

## 2023-01-19 RX ADMIN — Medication 1000 UNITS: at 08:54

## 2023-01-19 RX ADMIN — ONDANSETRON 4 MG: 2 INJECTION INTRAMUSCULAR; INTRAVENOUS at 07:58

## 2023-01-19 ASSESSMENT — PAIN DESCRIPTION - ONSET: ONSET: ON-GOING

## 2023-01-19 ASSESSMENT — PAIN SCALES - GENERAL
PAINLEVEL_OUTOF10: 6
PAINLEVEL_OUTOF10: 8
PAINLEVEL_OUTOF10: 7

## 2023-01-19 ASSESSMENT — PAIN DESCRIPTION - DESCRIPTORS: DESCRIPTORS: ACHING;DISCOMFORT;DULL

## 2023-01-19 ASSESSMENT — PAIN DESCRIPTION - ORIENTATION: ORIENTATION: RIGHT;LEFT;MID

## 2023-01-19 ASSESSMENT — PAIN DESCRIPTION - FREQUENCY: FREQUENCY: CONTINUOUS

## 2023-01-19 ASSESSMENT — PAIN DESCRIPTION - LOCATION: LOCATION: ABDOMEN

## 2023-01-19 NOTE — PROGRESS NOTES
[facility] PROGRESS NOTE      Chief complaint: Follow-up of fever     The patient is a 66 y.o. 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 On admission, she had fever up to 101.9 °F with leukocytosis of 13,000. SARS-CoV-2 and rapid influenza antigens were negative. CT chest, abdomen and pelvis showed stable subcentimeter nodule in periphery of right upper lobe near the apex and subcentimeter subpleural nodules in the anterior right upper lobe suspicious for metastatic disease, no focal consolidation or pulmonary edema or pleural effusion, stable hepatomegaly and multiple liver metastasis, diffuse osteoblastic metastasis, cholecystectomy with dilated common bile duct. Urinalysis showed insignificant pyuria of 0-1 WBC. Stool C difficile toxin assay was negative. Cefepime and metronidazole were started on admission. Subjective: Patient was seen and examined. No chills, no abdominal pain, no diarrhea, no rash, no itching. Afebrile. Objective:  BP (!) 102/56   Pulse (!) 104   Temp 98.3 °F (36.8 °C) (Temporal)   Resp 16   Wt 154 lb (69.9 kg)   LMP 01/10/2013   SpO2 97%   BMI 30.08 kg/m²   Constitutional: Alert, not in distress  Respiratory: Clear breath sounds, no crackles, no wheezes  Cardiovascular: Regular rate and rhythm, no murmurs  Gastrointestinal: Bowel sounds present, soft, nontender  Skin: Warm and dry, no active dermatoses  Musculoskeletal: No joint swelling, no joint erythema    Labs, imaging, and medical records/notes were personally reviewed.     Assessment:  SIRS, rule out sepsis (fever, leukocytosis) of unclear etiology  Diarrhea  Neuroendocrine tumor with lung, liver and bone metastases    Recommendations:  Continue ceftriaxone 2g q24h and continue metronidazole 500 mg q8h. Follow up blood cultures. Continue supportive care. Thank you for involving me in the care of Pavan Vo. I will continue to follow. Please do not hesitate to call for any questions or concerns.     Electronically signed by Milagro Brantley MD on 1/19/2023 at 10:26 AM

## 2023-01-19 NOTE — PROGRESS NOTES
[facility]                                Progress note    Subjective: The patient is awake and alert. States she still unable to eat at this time. Patient states she does not feel hungry at all  No acute events overnight. Denies chest pain, angina, SOB     Objective:    BP (!) 102/56   Pulse (!) 104   Temp 98.3 °F (36.8 °C) (Temporal)   Resp 16   Wt 154 lb (69.9 kg)   LMP 01/10/2013   SpO2 97%   BMI 30.08 kg/m²     In: 360 [P.O.:360]  Out: -   In: 360   Out: -     General appearance: NAD, conversant  HEENT: AT/NC, MMM  Neck: FROM, supple  Lungs: Clear to auscultation  CV: RRR, no MRGs  Vasc: Radial pulses 2+  Abdomen: Soft, non-tender; no masses or HSM  Extremities: No peripheral edema or digital cyanosis  Skin: no rash, lesions or ulcers  Psych: Alert and oriented to person, place and time  Neuro: Alert and interactive     Recent Labs     01/17/23  1930 01/19/23  0541   WBC 13.7* 10.9   HGB 10.7* 8.7*   HCT 32.1* 26.1*   * 503*       Recent Labs     01/17/23  1930 01/18/23  1017    137   K 5.5* 4.2    107   CO2 18* 20*   BUN 13 13   CREATININE 0.7 0.6   CALCIUM 7.1* 6.3*       Assessment:    Principal Problem:    Sepsis ([address]  Resolved Problems:    * No resolved hospital problems. *      Plan:    82-year-old female with an extensive history neuroendocrine tumor with sensitive osteoblastic metastatic disease and liver mets presents to the ED with abdominal pain and is admitted to telemetry unit with     Sepsis of unknown etiology  Monitor labs-WBC 13.7  IV Rocephin/Flagyl  Pain control  IV hydration  ID is following  C.  Difficile  - pending  Blood cultures pending  Continue antibiotic treatment at discretion of ID  Once optimized from infectious standpoint, She can be discharged to follow-up with her primary allergy team in [facility]    1/19/2023  Patient continues with nausea and vomiting and decreased appetite  Initiate Megace for stimulation  Cultures pending  Medication changed to Rocephin 2 g every 24 hours and Flagyl 500 mg every 8  Vital signs stable  WBC-10.9  Continue IV hydration  Check a.m. labs       Code status: Full  Consultants: ID     DVT Prophylaxis   PT/OT  Discharge planning     Nii Tarangoippo, APRN - CNP  2:36 PM  1/19/2023     Above note edited to reflect my thoughts     -Hypocalcemia-calcium gluconate x1  -Once patient's acute infectious issues resolved, she can be discharged, her cancer work-up follow-up is with [facility]  -She would benefit with palliative care evaluation locally, (she does have a palliative care physician in [facility] [employer] however that is over 2 hours away for her) given stage IV primary neuroendocrine tumor with diffuse metastatic disease-for comfort meds    I personally saw, examined and provided care for the patient. Radiographs, labs and medication list were reviewed by me independently. The case was discussed in detail and plans for care were established. Review of Araceli STROUD- CNP, documentation was conducted and revisions were made as appropriate directly by me. I agree with the above documented exam, problem list, and plan of care.      Soni Christian MD  8:11 PM  1/19/2023

## 2023-01-20 LAB
ANION GAP SERPL CALCULATED.3IONS-SCNC: 9 MMOL/L (ref 7–16)
BASOPHILS ABSOLUTE: 0.05 E9/L (ref 0–0.2)
BASOPHILS RELATIVE PERCENT: 0.6 % (ref 0–2)
BUN BLDV-MCNC: 6 MG/DL (ref 6–23)
CALCIUM SERPL-MCNC: 6.3 MG/DL (ref 8.6–10.2)
CHLORIDE BLD-SCNC: 112 MMOL/L (ref 98–107)
CO2: 17 MMOL/L (ref 22–29)
CREAT SERPL-MCNC: 0.5 MG/DL (ref 0.5–1)
EOSINOPHILS ABSOLUTE: 0.19 E9/L (ref 0.05–0.5)
EOSINOPHILS RELATIVE PERCENT: 2.1 % (ref 0–6)
GFR SERPL CREATININE-BSD FRML MDRD: >60 ML/MIN/1.73
GLUCOSE BLD-MCNC: 97 MG/DL (ref 74–99)
HCT VFR BLD CALC: 24.8 % (ref 34–48)
HEMOGLOBIN: 8.3 G/DL (ref 11.5–15.5)
IMMATURE GRANULOCYTES #: 0.07 E9/L
IMMATURE GRANULOCYTES %: 0.8 % (ref 0–5)
LYMPHOCYTES ABSOLUTE: 1.62 E9/L (ref 1.5–4)
LYMPHOCYTES RELATIVE PERCENT: 17.9 % (ref 20–42)
MCH RBC QN AUTO: 23.4 PG (ref 26–35)
MCHC RBC AUTO-ENTMCNC: 33.5 % (ref 32–34.5)
MCV RBC AUTO: 70.1 FL (ref 80–99.9)
METER GLUCOSE: 108 MG/DL (ref 74–99)
METER GLUCOSE: 117 MG/DL (ref 74–99)
METER GLUCOSE: 119 MG/DL (ref 74–99)
METER GLUCOSE: 127 MG/DL (ref 74–99)
METER GLUCOSE: 143 MG/DL (ref 74–99)
MONOCYTES ABSOLUTE: 0.84 E9/L (ref 0.1–0.95)
MONOCYTES RELATIVE PERCENT: 9.3 % (ref 2–12)
NEUTROPHILS ABSOLUTE: 6.28 E9/L (ref 1.8–7.3)
NEUTROPHILS RELATIVE PERCENT: 69.3 % (ref 43–80)
PDW BLD-RTO: 15.9 FL (ref 11.5–15)
PLATELET # BLD: 507 E9/L (ref 130–450)
PMV BLD AUTO: 10 FL (ref 7–12)
POTASSIUM SERPL-SCNC: 3.4 MMOL/L (ref 3.5–5)
RBC # BLD: 3.54 E12/L (ref 3.5–5.5)
SODIUM BLD-SCNC: 138 MMOL/L (ref 132–146)
URINE CULTURE, ROUTINE: NORMAL
WBC # BLD: 9.1 E9/L (ref 4.5–11.5)

## 2023-01-20 PROCEDURE — 36415 COLL VENOUS BLD VENIPUNCTURE: CPT

## 2023-01-20 PROCEDURE — 82962 GLUCOSE BLOOD TEST: CPT

## 2023-01-20 PROCEDURE — 80048 BASIC METABOLIC PNL TOTAL CA: CPT

## 2023-01-20 PROCEDURE — 2580000003 HC RX 258: Performed by: FAMILY MEDICINE

## 2023-01-20 PROCEDURE — 6370000000 HC RX 637 (ALT 250 FOR IP): Performed by: INTERNAL MEDICINE

## 2023-01-20 PROCEDURE — 2580000003 HC RX 258

## 2023-01-20 PROCEDURE — 6360000002 HC RX W HCPCS

## 2023-01-20 PROCEDURE — 6370000000 HC RX 637 (ALT 250 FOR IP): Performed by: NURSE PRACTITIONER

## 2023-01-20 PROCEDURE — 6360000002 HC RX W HCPCS: Performed by: NURSE PRACTITIONER

## 2023-01-20 PROCEDURE — 6370000000 HC RX 637 (ALT 250 FOR IP)

## 2023-01-20 PROCEDURE — 6370000000 HC RX 637 (ALT 250 FOR IP): Performed by: FAMILY MEDICINE

## 2023-01-20 PROCEDURE — 6360000002 HC RX W HCPCS: Performed by: FAMILY MEDICINE

## 2023-01-20 PROCEDURE — 99222 1ST HOSP IP/OBS MODERATE 55: CPT | Performed by: NURSE PRACTITIONER

## 2023-01-20 PROCEDURE — 85025 COMPLETE CBC W/AUTO DIFF WBC: CPT

## 2023-01-20 PROCEDURE — 2060000000 HC ICU INTERMEDIATE R&B

## 2023-01-20 RX ORDER — POTASSIUM CHLORIDE 20 MEQ/1
40 TABLET, EXTENDED RELEASE ORAL ONCE
Status: COMPLETED | OUTPATIENT
Start: 2023-01-20 | End: 2023-01-20

## 2023-01-20 RX ORDER — MORPHINE SULFATE 4 MG/ML
4 INJECTION, SOLUTION INTRAMUSCULAR; INTRAVENOUS EVERY 6 HOURS PRN
Status: DISCONTINUED | OUTPATIENT
Start: 2023-01-20 | End: 2023-01-21 | Stop reason: HOSPADM

## 2023-01-20 RX ORDER — MORPHINE SULFATE 2 MG/ML
2 INJECTION, SOLUTION INTRAMUSCULAR; INTRAVENOUS EVERY 6 HOURS PRN
Status: DISCONTINUED | OUTPATIENT
Start: 2023-01-20 | End: 2023-01-21 | Stop reason: HOSPADM

## 2023-01-20 RX ORDER — OXYCODONE HYDROCHLORIDE 10 MG/1
10 TABLET ORAL EVERY 4 HOURS PRN
Status: DISCONTINUED | OUTPATIENT
Start: 2023-01-20 | End: 2023-01-21 | Stop reason: HOSPADM

## 2023-01-20 RX ORDER — MEGESTROL ACETATE 40 MG/ML
200 SUSPENSION ORAL DAILY
Status: DISCONTINUED | OUTPATIENT
Start: 2023-01-21 | End: 2023-01-21 | Stop reason: HOSPADM

## 2023-01-20 RX ORDER — LOPERAMIDE HYDROCHLORIDE 2 MG/1
2 CAPSULE ORAL 4 TIMES DAILY PRN
Status: DISCONTINUED | OUTPATIENT
Start: 2023-01-20 | End: 2023-01-21 | Stop reason: HOSPADM

## 2023-01-20 RX ORDER — PROCHLORPERAZINE EDISYLATE 5 MG/ML
5 INJECTION INTRAMUSCULAR; INTRAVENOUS EVERY 6 HOURS PRN
Status: DISCONTINUED | OUTPATIENT
Start: 2023-01-20 | End: 2023-01-21 | Stop reason: HOSPADM

## 2023-01-20 RX ORDER — DRONABINOL 2.5 MG/1
2.5 CAPSULE ORAL DAILY
Status: DISCONTINUED | OUTPATIENT
Start: 2023-01-20 | End: 2023-01-20

## 2023-01-20 RX ADMIN — MEGESTROL ACETATE 200 MG: 40 SUSPENSION ORAL at 11:08

## 2023-01-20 RX ADMIN — METRONIDAZOLE 500 MG: 500 TABLET ORAL at 14:25

## 2023-01-20 RX ADMIN — METRONIDAZOLE 500 MG: 500 TABLET ORAL at 05:19

## 2023-01-20 RX ADMIN — POTASSIUM CHLORIDE 40 MEQ: 1500 TABLET, EXTENDED RELEASE ORAL at 11:08

## 2023-01-20 RX ADMIN — MORPHINE SULFATE 4 MG: 4 INJECTION, SOLUTION INTRAMUSCULAR; INTRAVENOUS at 05:10

## 2023-01-20 RX ADMIN — ENOXAPARIN SODIUM 40 MG: 100 INJECTION SUBCUTANEOUS at 11:09

## 2023-01-20 RX ADMIN — OLANZAPINE 5 MG: 5 TABLET, FILM COATED ORAL at 21:18

## 2023-01-20 RX ADMIN — Medication 1000 UNITS: at 11:08

## 2023-01-20 RX ADMIN — OXYCODONE HYDROCHLORIDE 10 MG: 10 TABLET ORAL at 20:17

## 2023-01-20 RX ADMIN — MORPHINE SULFATE 4 MG: 4 INJECTION, SOLUTION INTRAMUSCULAR; INTRAVENOUS at 13:39

## 2023-01-20 RX ADMIN — SODIUM CHLORIDE: 9 INJECTION, SOLUTION INTRAVENOUS at 02:59

## 2023-01-20 RX ADMIN — CALCIUM GLUCONATE 2000 MG: 98 INJECTION, SOLUTION INTRAVENOUS at 11:43

## 2023-01-20 RX ADMIN — Medication 10 ML: at 20:21

## 2023-01-20 ASSESSMENT — PAIN DESCRIPTION - ORIENTATION
ORIENTATION: LEFT;RIGHT;MID
ORIENTATION: LEFT;RIGHT;MID

## 2023-01-20 ASSESSMENT — PAIN SCALES - GENERAL
PAINLEVEL_OUTOF10: 8
PAINLEVEL_OUTOF10: 7
PAINLEVEL_OUTOF10: 7

## 2023-01-20 ASSESSMENT — PAIN DESCRIPTION - DESCRIPTORS: DESCRIPTORS: ACHING;DISCOMFORT;DULL

## 2023-01-20 ASSESSMENT — PAIN DESCRIPTION - FREQUENCY: FREQUENCY: CONTINUOUS

## 2023-01-20 ASSESSMENT — PAIN DESCRIPTION - ONSET: ONSET: ON-GOING

## 2023-01-20 ASSESSMENT — PAIN DESCRIPTION - LOCATION
LOCATION: ABDOMEN
LOCATION: ABDOMEN

## 2023-01-20 ASSESSMENT — PAIN DESCRIPTION - PAIN TYPE: TYPE: ACUTE PAIN

## 2023-01-20 NOTE — ACP (ADVANCE CARE PLANNING)
Advance Care Planning   Healthcare Decision Maker:    Primary Decision Maker: Darlin Older Spouse - [phone]    Secondary Decision Maker: Coretta Sanchez - Child - [phone]    Click here to complete Healthcare Decision Makers including selection of the Healthcare Decision Maker Relationship (ie \"Primary\").

## 2023-01-20 NOTE — CARE COORDINATION
Case Management Assessment  Initial Evaluation    Date/Time of Evaluation: 1/20/2023 11:48 AM  Assessment Completed by: Avelino De La Paz RN    If patient is discharged prior to next notation, then this note serves as note for discharge by case management. Patient Name: Jeffy Pal                   YOB: 1960  Diagnosis: Septicemia ([facility] [address] [A41.9]  Abdominal pain, unspecified abdominal location [R10.9]  Malignant neoplasm metastatic to other site [facility]) [C79.89]  Sepsis ([facility] [address] [A41.9]                   Date / Time: 1/17/2023  6:05 PM    Patient Admission Status: Inpatient   Readmission Risk (Low < 19, Mod (19-27), High > 27): Readmission Risk Score: 15.1    Current PCP: Gin Cortes, DO  PCP verified by CM? Chart Reviewed: Yes      History Provided by:    Patient Orientation:      Patient Cognition:      Hospitalization in the last 30 days (Readmission):  No    If yes, Readmission Assessment in CM Navigator will be completed. Advance Directives:      Code Status: Full Code   Patient's Primary Decision Maker is:      Primary Decision Maker: Cory Jon Spouse - [phone]    Secondary Decision Maker: Merly Yoo - Child - [phone]    Discharge Planning:    Patient lives with:   Type of Home:    Primary Care Giver:    Patient Support Systems include:     Current Financial resources:    Current community resources:    Current services prior to admission:              Current DME:              Type of Home Care services:       ADLS  Prior functional level:    Current functional level:      PT AM-PAC:   /24  OT AM-PAC: 21 /24    Family can provide assistance at DC: Would you like Case Management to discuss the discharge plan with any other family members/significant others, and if so, who?     Plans to Return to Present Housing:    Other Identified Issues/Barriers to RETURNING to current housing: none identified  Potential Assistance needed at discharge: Potential DME:    Patient expects to discharge to:    Plan for transportation at discharge:      Financial    Payor: Malia Bonilla / Plan: Son Orn / Product Type: *No Product type* /     Does insurance require precert for SNF: Yes    Potential assistance Purchasing Medications:    Meds-to-Beds request: Yes      [facility] [id number] V[place]ay Carmona [address] [address]  Phone: [phone] Fax: [phone]      Notes:    Factors facilitating achievement of predicted outcomes: Cooperative    Barriers to discharge: none identified    Additional Case Management Notes: Met with pt at bedside to discuss discharge / transition of care plan. Pt reports from home with spouse; independent of all ADL; denies DME or needs; active ; verified PCP and pharmacy; discharge plan is to return home when medically stable with no needs; daughter to provide transport. The Plan for Transition of Care is related to the following treatment goals of Septicemia ([facility] [address] [A41.9]  Abdominal pain, unspecified abdominal location [R10.9]  Malignant neoplasm metastatic to other site [facility]) [C79.89]  Sepsis ([facility] [address] [R10.2]    IF APPLICABLE: The Patient and/or patient representative Sher Benítez and her family were provided with a choice of provider and agrees with the discharge plan. Freedom of choice list with basic dialogue that supports the patient's individualized plan of care/goals and shares the quality data associated with the providers was provided to:     Patient Representative Name:       The Patient and/or Patient Representative Agree with the Discharge Plan?       Ann De La Rosa RN  Case Management Department  Ph: [phone]

## 2023-01-20 NOTE — PROGRESS NOTES
[facility]                                Progress note    Subjective: The patient is awake and alert. States she still unable to eat at this time. Patient states she does not feel hungry at all  No acute events overnight. Denies chest pain, angina, SOB     Objective:    /67   Pulse (!) 112   Temp 98.8 °F (37.1 °C)   Resp 16   Wt 154 lb (69.9 kg)   LMP 01/10/2013   SpO2 96%   BMI 30.08 kg/m²     In: 120 [P.O.:120]  Out: -   In: 120   Out: -     General appearance: NAD, conversant  HEENT: AT/NC, MMM  Neck: FROM, supple  Lungs: Clear to auscultation  CV: RRR, no MRGs  Vasc: Radial pulses 2+  Abdomen: Soft, non-tender; no masses or HSM  Extremities: No peripheral edema or digital cyanosis  Skin: no rash, lesions or ulcers  Psych: Alert and oriented to person, place and time  Neuro: Alert and interactive     Recent Labs     01/17/23  1930 01/19/23  0541 01/20/23  0525   WBC 13.7* 10.9 9.1   HGB 10.7* 8.7* 8.3*   HCT 32.1* 26.1* 24.8*   * 503* 507*       Recent Labs     01/18/23  1017 01/19/23  1321 01/20/23  0525    138 138   K 4.2 3.9 3.4*    109* 112*   CO2 20* 18* 17*   BUN 13 7 6   CREATININE 0.6 0.5 0.5   CALCIUM 6.3* 6.4* 6.3*       Assessment:    Principal Problem:    Sepsis ([address]  Resolved Problems:    * No resolved hospital problems. *      Plan:    80-year-old female with an extensive history neuroendocrine tumor with sensitive osteoblastic metastatic disease and liver mets presents to the ED with abdominal pain and is admitted to telemetry unit with     Sepsis of unknown etiology  Monitor labs-WBC 13.7  IV Rocephin/Flagyl  Pain control  IV hydration  ID is following  C.  Difficile  - pending  Blood cultures pending  Continue antibiotic treatment at discretion of ID  Once optimized from infectious standpoint, She can be discharged to follow-up with her primary allergy team in [place]    1/19/2023  Patient continues with nausea and vomiting and decreased appetite  Initiate Megace for stimulation  Cultures pending  Medication changed to Rocephin 2 g every 24 hours and Flagyl 500 mg every 8  Vital signs stable  WBC-10.9  Continue IV hydration  Check a.m. labs    1/20/2023  Calcium 6.3-supplement calcium gluconate 2 g x 1  Supplement potassium  WBC-9.1  Continue Rocephin and Flagyl  Patient tolerating diet  Discontinue IV hydration  Vital signs stable  Palliative med following  [id number] Madeleine Martinez for home if ok w id        Code status: Full  Consultants: ID, palliative med     DVT Prophylaxis   PT/OT  Discharge planning     MAXIMUS Chavez CNP  11:53 AM  1/20/2023     Above note edited to reflect my thoughts     I personally saw, examined and provided care for the patient. Radiographs, labs and medication list were reviewed by me independently. The case was discussed in detail and plans for care were established. Review of Araceli WRIGHT CNP, documentation was conducted and revisions were made as appropriate directly by me. I agree with the above documented exam, problem list, and plan of care.      Leidy Connell MD  3:17 PM  1/20/2023

## 2023-01-20 NOTE — PROGRESS NOTES
YUDI PROGRESS NOTE      Chief complaint: Follow-up of fever     The patient is a 66 y.o. female with history of hypertension, hyperlipidemia, neuroendocrine tumor with liver and right iliac bone metastases diagnosed in 2022, currently on octreotide and following up at [facility], presented on 01/17 with worsening of chronic diffuse aching abdominal pain for 3 days accompanied by diarrhea. On admission, she had fever up to 101.9 °F with leukocytosis of 13,000. SARS-CoV-2 and rapid influenza antigens were negative. CT chest, abdomen and pelvis showed stable subcentimeter nodule in periphery of right upper lobe near the apex and subcentimeter subpleural nodules in the anterior right upper lobe suspicious for metastatic disease, no focal consolidation or pulmonary edema or pleural effusion, stable hepatomegaly and multiple liver metastasis, diffuse osteoblastic metastasis, cholecystectomy with dilated common bile duct. Urinalysis showed insignificant pyuria of 0-1 WBC with urine culture growing <10,000 CFU/mL of Gram-negative rods and mixed Gram-positive organisms. Stool C difficile toxin assay was negative. Cefepime and metronidazole were started on admission. Subjective: Patient was seen and examined. No chills, no abdominal pain, has unchanged diarrhea, no rash, no itching. Afebrile. Objective:  /67   Pulse (!) 112   Temp 98.8 °F (37.1 °C)   Resp 16   Wt 154 lb (69.9 kg)   LMP 01/10/2013   SpO2 96%   BMI 30.08 kg/m²   Constitutional: Alert, not in distress  Respiratory: Clear breath sounds, no crackles, no wheezes  Cardiovascular: Regular rate and rhythm, no murmurs  Gastrointestinal: Bowel sounds present, soft, nontender  Skin: Warm and dry, no active dermatoses  Musculoskeletal: No joint swelling, no joint erythema    Labs, imaging, and medical records/notes were personally reviewed.     Assessment:  SIRS, rule out sepsis (fever, leukocytosis) of unclear etiology, resolved  Diarrhea  Neuroendocrine tumor with lung, liver and bone metastases  Asymptomatic bacteriuria    Recommendations:  Stop antibiotics. Monitor clinically off antibiotics for now. Follow up blood cultures. Continue supportive care. Thank you for involving me in the care of Lb Smallwood. I will sign off for now. Please do not hesitate to call for any questions, concerns, or new findings.       Electronically signed by Francesca Hernandez MD on 1/20/2023 at 10:16 AM

## 2023-01-20 NOTE — PROGRESS NOTES
Pt seen to provide and review HCPOA and Living will forms which she will complete on her own. She plans to continue seeing her Oncologist in [place] but would like to transfer to our outpatient Palliative Medicine clinic for symptom management. Will review this with NP.  will remain available for on-going psychosocial support, as needed.

## 2023-01-20 NOTE — CONSULTS
Palliative Care Department  [phone]  Palliative Care Initial Consult  Provider AMXIMUS Ray - CNP      PATIENT: Clare Serna  : 1960  MRN: [id number]  ADMISSION DATE: 2023  6:05 PM  Referring Provider: Earnest Michel MD    Palliative Medicine was consulted on hospital day 3 for assistance with  Symptom management     HPI:     Bhaskar Reza is a 66 y.o. y/o female with a history of  hypertension, hyperlipidemia, neuroendocrine tumor of the lung with liver and right iliac bone metastases diagnosed in , currently on octreotide and following up at [facility] who presented to [facility]) on 2023 with abdominal pain. CT of the abdomen showed stable hepatomegaly and multiple liver metastasis, and diffuse osteoblastic metastasis, and a dilated common bile duct. She had a fever and leukocytosis, she was started on antibiotics and ID was consulted. 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 Capacity is time limited and situation/question specific  During encounter no surrogate medical decision-maker was needed  Outcome of goals of care meeting: Continue current medical management and follow-up with [facility] oncology.   Code status Full Code  Advanced Directives: no POA or living will in epic  Surrogate/Legal NOK:  Nemo Tom () [phone]    Spiritual assessment: no spiritual distress identified  Bereavement and grief: to be determined  Referrals to: Outpatient palliative care appointment scheduled for 2/6 at 1430. Thank you for the opportunity to participate in the care of Liza Garcia MAXIMUS Olsen - JANELLE   Palliative Medicine     SUBJECTIVE:     Details of Conversation: Chart reviewed and met with Mrs. Simon at the bedside. I introduced palliative medicine, she explains that she is familiar and does [facility]. She is interested in establishing with the palliative care clinic at [facility] to be closer to home. [facility] and she is currently on octreotide. She explains that she tolerates the injections well. Plan is to continue [facility] and continue with injections. We discussed her advanced neuroendocrine cancer and advanced wishes. She expresses that she does want to be prepared and is interested in filling out a living will. We also discussed CODE STATUS options. Her main symptoms are nausea, loss of appetite, and pain. She explains that the pain is across her upper abdomen and describes it as a tight band. She states that she was having sharp and stabbing pains, but those have improved. She is currently using 4 mg of IV morphine about 2 times a day. She states that does make her pain feel better but not completely going away. We discussed expectations with pain and that goal is getting into a manageable level. I explained that I would start oxycodone and encouraged oral use of the medication so we know it works before discharge. This morning she was started on Megace, she states that it tasted really bad, but she did eat all of her breakfast this morning. She also states that this is the best she has felt today.   She does continue to have nausea we discussed alternating with Zofran and Compazine. She states that she has also had diarrhea since being diagnosed with cancer. She states that she is having multiple loose bowel movements a day. She denied any other needs or concerns at this time. Prognosis: Guarded    OBJECTIVE:     /67   Pulse (!) 112   Temp 98.8 °F (37.1 °C)   Resp 16   Wt 154 lb (69.9 kg)   LMP 01/10/2013   SpO2 96%   BMI 30.08 kg/m²     Physical Examination:  Gen: elderly, thin, NAD, awake, alert   HEENT: normocephalic, atraumatic  Neck: trachea midline, no JVD  Lungs: respirations easy and not labored,   Heart: regular rate and rhythm, distant heart tones,   Abdomen: normoactive bowel sounds, soft, non-tender  Extremities: no clubbing, cyanosis or edema, moving all extremities    Skin: warm, dry without rashes, lesions, bruising  Neuro: awake, alert, oriented x 3, follows commands, no gross neurologic deficit    Objective data reviewed: labs, images, records, medication use, vitals, and chart    Time/Communication  Greater than 50% of time spent, total 55 minutes in counseling and coordination of care at the bedside regarding symptom management. Thank you for allowing Palliative Medicine to participate in the care of Antonio Norman. Note: This report was completed using computerTelnic voiced recognition software. Every effort has been made to ensure accuracy; however, inadvertent computerized transcription errors may be present.

## 2023-01-21 VITALS
SYSTOLIC BLOOD PRESSURE: 108 MMHG | BODY MASS INDEX: 30.08 KG/M2 | OXYGEN SATURATION: 96 % | RESPIRATION RATE: 18 BRPM | TEMPERATURE: 96.6 F | HEART RATE: 98 BPM | DIASTOLIC BLOOD PRESSURE: 65 MMHG | WEIGHT: 154 LBS

## 2023-01-21 LAB
METER GLUCOSE: 72 MG/DL (ref 74–99)
METER GLUCOSE: 94 MG/DL (ref 74–99)

## 2023-01-21 PROCEDURE — 2580000003 HC RX 258

## 2023-01-21 PROCEDURE — 6370000000 HC RX 637 (ALT 250 FOR IP): Performed by: NURSE PRACTITIONER

## 2023-01-21 PROCEDURE — 6360000002 HC RX W HCPCS

## 2023-01-21 PROCEDURE — 6370000000 HC RX 637 (ALT 250 FOR IP)

## 2023-01-21 PROCEDURE — 82962 GLUCOSE BLOOD TEST: CPT

## 2023-01-21 PROCEDURE — 6360000002 HC RX W HCPCS: Performed by: NURSE PRACTITIONER

## 2023-01-21 PROCEDURE — 2580000003 HC RX 258: Performed by: INTERNAL MEDICINE

## 2023-01-21 PROCEDURE — 99231 SBSQ HOSP IP/OBS SF/LOW 25: CPT | Performed by: NURSE PRACTITIONER

## 2023-01-21 RX ORDER — OXYCODONE HYDROCHLORIDE 10 MG/1
10 TABLET ORAL EVERY 4 HOURS PRN
Qty: 84 TABLET | Refills: 0 | Status: SHIPPED | OUTPATIENT
Start: 2023-01-21 | End: 2023-02-04

## 2023-01-21 RX ORDER — MEGESTROL ACETATE 40 MG/ML
200 SUSPENSION ORAL DAILY
Qty: 240 ML | Refills: 0 | Status: SHIPPED | OUTPATIENT
Start: 2023-01-22

## 2023-01-21 RX ADMIN — MORPHINE SULFATE 4 MG: 4 INJECTION, SOLUTION INTRAMUSCULAR; INTRAVENOUS at 11:25

## 2023-01-21 RX ADMIN — Medication 10 ML: at 08:23

## 2023-01-21 RX ADMIN — ENOXAPARIN SODIUM 40 MG: 100 INJECTION SUBCUTANEOUS at 08:24

## 2023-01-21 RX ADMIN — Medication 1000 UNITS: at 08:24

## 2023-01-21 RX ADMIN — OXYCODONE HYDROCHLORIDE 10 MG: 10 TABLET ORAL at 09:23

## 2023-01-21 RX ADMIN — MEGESTROL ACETATE 200 MG: 40 SUSPENSION ORAL at 08:23

## 2023-01-21 RX ADMIN — SODIUM CHLORIDE, PRESERVATIVE FREE 10 ML: 5 INJECTION INTRAVENOUS at 08:23

## 2023-01-21 ASSESSMENT — PAIN DESCRIPTION - LOCATION
LOCATION: ABDOMEN

## 2023-01-21 ASSESSMENT — PAIN SCALES - GENERAL
PAINLEVEL_OUTOF10: 4
PAINLEVEL_OUTOF10: 8
PAINLEVEL_OUTOF10: 7
PAINLEVEL_OUTOF10: 5
PAINLEVEL_OUTOF10: 9

## 2023-01-21 ASSESSMENT — PAIN DESCRIPTION - DESCRIPTORS
DESCRIPTORS: DISCOMFORT
DESCRIPTORS: DISCOMFORT

## 2023-01-21 ASSESSMENT — PAIN DESCRIPTION - ORIENTATION
ORIENTATION: RIGHT;LEFT;MID
ORIENTATION: RIGHT;LEFT;MID;UPPER
ORIENTATION: UPPER;LEFT;RIGHT

## 2023-01-21 NOTE — PROGRESS NOTES
Palliative Care Department  [phone]  Palliative Care Progress Note  Provider MAXIMUS Riojas - CNP      PATIENT: Marc Nuñez  : 1960  MRN: [id number]  ADMISSION DATE: 2023  6:05 PM  Referring Provider: Carlito Ross MD    Palliative Medicine was consulted on hospital day 3 for assistance with  Symptom management     HPI:     Heriberto Ramirez is a 66 y.o. y/o female with a history of  hypertension, hyperlipidemia, neuroendocrine tumor of the lung with liver and right iliac bone metastases diagnosed in , currently on octreotide and following up at [facility] who presented to [facility]) on 2023 with abdominal pain. CT of the abdomen showed stable hepatomegaly and multiple liver metastasis, and diffuse osteoblastic metastasis, and a dilated common bile duct. She had a fever and leukocytosis, she was started on antibiotics and ID was consulted. 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 Capacity is time limited and situation/question specific  During encounter no surrogate medical decision-maker was needed  Outcome of goals of care meeting: Continue current medical management and follow-up with [facility] oncology.   Code status Full Code  Advanced Directives: no POA or living will in epic  Surrogate/Legal NOK:  Fabio Savage () [phone]    Spiritual assessment: no spiritual distress identified  Bereavement and grief: to be determined  Referrals to: Outpatient palliative care appointment scheduled for 2/6 at 1430. Thank you for the opportunity to participate in the care of Car Ortiz. Bettina Yeh, MAXIMUS - CNP   Palliative Medicine     SUBJECTIVE:     Details of Conversation: Chart reviewed and met with Riya Sparrow at the bedside. She had a dose of her oxycodone last night and she states that pain was well controlled until this morning. She denied any side effects from the medication. She states that she was able to eat half of her breakfast and has not needed any nausea medications. She states that she has Zofran and Compazine at home. She states that diarrhea has improved. She states that she had a loose bowel movement today, but not as bad as it has been. We discussed that opioid medications can slow her bowels and cause constipation. We discussed using Imodium she is having 4 more loose bowel movements a day, and the need for an over-the-counter stool softener if it has been a few days without a bowel movement. Prescriptions printed for oxycodone and Megace. Outpatient palliative care appointment set up.     Prognosis: Guarded    OBJECTIVE:     /65   Pulse 98   Temp (!) 96.6 °F (35.9 °C) (Temporal)   Resp 18   Wt 154 lb (69.9 kg)   LMP 01/10/2013   SpO2 96%   BMI 30.08 kg/m²     Physical Examination:  Gen: elderly, thin, NAD, awake, alert   HEENT: normocephalic, atraumatic  Neck: trachea midline, no JVD  Lungs: respirations easy and not labored,   Heart: regular rate and rhythm, distant heart tones,   Abdomen: normoactive bowel sounds, soft, non-tender  Extremities: no clubbing, cyanosis or edema, moving all extremities    Skin: warm, dry without rashes, lesions, bruising  Neuro: awake, alert, oriented x 3, follows commands, no gross neurologic deficit    Objective data reviewed: labs, images, records, medication use, vitals, and chart    Time/Communication  Greater than 50% of time spent, total 25 minutes in counseling and coordination of care at the bedside regarding symptom management. Thank you for allowing Palliative Medicine to participate in the care of Araceli Session. Note: This report was completed using [email or web address] voiced recognition software. Every effort has been made to ensure accuracy; however, inadvertent computerized transcription errors may be present.

## 2023-01-21 NOTE — PROGRESS NOTES
Discharge paperwork, meds and follow up appointments reviewed with pt. IV and tele removed. Assisted pt to collect belongings. Pt awaiting her ride.

## 2023-01-21 NOTE — DISCHARGE SUMMARY
[facility]   Discharge summary   Patient ID:  Marisabel Mccall  [id number]  2 y.o.  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  Difficile  - pending  Blood cultures pending  Continue antibiotic treatment at discretion of ID  Once optimized from infectious standpoint, She can be discharged to follow-up with her primary allergy team in [facility] [place]     1/19/2023  Patient continues with nausea and vomiting and decreased appetite  Initiate Megace for stimulation  Cultures pending  Medication changed to Rocephin 2 g every 24 hours and Flagyl 500 mg every 8  Vital signs stable  WBC-10.9  Continue IV hydration  Check a.m. labs     1/20/2023  Calcium 6.3-supplement calcium gluconate 2 g x 1  Supplement potassium  WBC-9.1  Continue Rocephin and Flagyl  Patient tolerating diet  Discontinue IV hydration  Vital signs stable  Palliative med following  Ok for home if ok w id     1/21/2023  Antibiotics have been discontinued by infectious disease  Okay for discharge from medicine standpoint  She is now established with palliative care at home she can follow-up with locally  She has been initiated on Megace, oral OxyContin and Oxy IR etc.  Follow-up with PCP within 1 week     Recent Labs     01/19/23  0541 01/20/23  0525   WBC 10.9 9.1   HGB 8.7* 8.3*   HCT 26.1* 24.8*   * 507*       Recent Labs     01/19/23  1321 01/20/23  0525    138   K 3.9 3.4*   * 112*   CO2 18* 17*   BUN 7 6   CREATININE 0.5 0.5   CALCIUM 6.4* 6.3*       XR CHEST (2 VW)    Result Date: 1/17/2023  EXAMINATION: TWO XRAY VIEWS OF THE CHEST 1/17/2023 7:27 pm COMPARISON: None. HISTORY: ORDERING SYSTEM PROVIDED HISTORY: Back pain going between shoulder blades TECHNOLOGIST PROVIDED HISTORY: Reason for exam:->Back pain going between shoulder blades FINDINGS: The lungs are without acute focal process. There is no effusion or pneumothorax. The cardiomediastinal silhouette is without acute process. The osseous structures are without acute process. No acute process. CT ABDOMEN PELVIS W IV CONTRAST Additional Contrast? None    Result Date: 1/17/2023  EXAMINATION: CT OF THE ABDOMEN AND PELVIS WITH CONTRAST 1/17/2023 7:28 pm TECHNIQUE: CT of the abdomen and pelvis was performed with the administration of intravenous contrast. Multiplanar reformatted images are provided for review. Automated exposure control, iterative reconstruction, and/or weight based adjustment of the mA/kV was utilized to reduce the radiation dose to as low as reasonably achievable. COMPARISON: None. HISTORY: ORDERING SYSTEM PROVIDED HISTORY: Lower abdominal pain, leukocytosis TECHNOLOGIST PROVIDED HISTORY: Reason for exam:->Lower abdominal pain, leukocytosis Additional Contrast?->None Decision Support Exception - unselect if not a suspected or confirmed emergency medical condition->Emergency Medical Condition (MA) FINDINGS: Lower Chest: No significant findings. Liver: There is stable hepatomegaly (25 cm). There is a normal bowel enhancing masses throughout liver parenchyma which has high association with metastatic disease which is stable. Gallbladder/biliary tree: Cholecystectomy.   The common bile duct is prominent measuring approximately 7.2 mm. Spleen: Normal size and contour. Pancreas: No significant findings. Adrenal glands: Appropriate size and configuration. Kidneys: Normal anatomic position. No space-occupying lesion. Stable punctate nonobstructive right renal calculi. No hydronephrosis. GI/Bowel: There is stable perigastric postoperative change. No significant bowel distention. No significant colonic diverticulosis. Appendix: Unremarkable. Pelvis: The visualized uterus and ovaries are within normal limits for patient's age. The urinary bladder is moderately distended. Peritoneum/Retroperitoneum: The aorta is appropriate in caliber. The IVC is within normal limits. No free fluid or free air. Bones/Soft Tissues: There is mild-moderate thoracolumbar spine degenerative change and mild scoliosis. There are multiple sclerotic lesions scattered throughout the visualized osseous structures including the thoracolumbar spine, bony pelvis, bilateral femurs, sternum, bony ribcage, which has high association with osteoblastic metastases. Stable hepatomegaly and multiple liver metastases. Diffuse osteoblastic metastases. Cholecystectomy. Dilated common bile duct. Perigastric postoperative changes. CTA PULMONARY W CONTRAST    Result Date: 1/17/2023  EXAMINATION: CTA OF THE CHEST 1/17/2023 7:28 pm TECHNIQUE: CTA of the chest was performed after the administration of intravenous contrast.  Multiplanar reformatted images are provided for review. MIP images are provided for review. Automated exposure control, iterative reconstruction, and/or weight based adjustment of the mA/kV was utilized to reduce the radiation dose to as low as reasonably achievable.  COMPARISON: Chest two view, 01/17/2023; CTA chest, 12/03/2022 HISTORY: ORDERING SYSTEM PROVIDED HISTORY: Pain going between shoulder pain, history of CA TECHNOLOGIST PROVIDED HISTORY: Reason for exam:->Pain going between shoulder pain, history of CA Decision Support Exception - unselect if not a suspected or confirmed emergency medical condition->Emergency Medical Condition (MA) FINDINGS: Pulmonary Arteries: The bolus is suboptimal.  No convincing evidence of intraluminal filling defect to suggest pulmonary embolism. Main pulmonary artery is normal in caliber. Mediastinum: No evidence of mediastinal lymphadenopathy. The heart and pericardium demonstrate no acute abnormality. There is no acute abnormality of the thoracic aorta. Lungs/pleura: There is stable subcentimeter nodule in periphery of right upper lobe near the apex and subcentimeter subpleural nodules (at least 3) in the anterior right upper lobe. These findings are suspicious for metastatic disease. There is linear atelectasis or scar in the posterior right lower lobe and left lower lobe. The lungs are without acute process. No focal consolidation or pulmonary edema. No evidence of pleural effusion or pneumothorax. Upper Abdomen: There are multiple enhancing metastatic nodules in the visualized liver parenchyma. There is stable perigastric postoperative changes. Soft Tissues/Bones: There is stable diffuse thyromegaly. There is stable multiple osteoblastic metastasis and visualized bony structures, including the bony sternum, thoracic spine and ribcage. Within exam limitations, there is no convincing evidence for acute pulmonary thromboembolism. Stable presumed subcentimeter nodule in the right lung, as detailed above. Metastatic disease of the liver and visualized osseous structures, as detailed above. Diffuse thyromegaly. RECOMMENDATIONS: Unavailable       Discharge Exam:    HEENT: NCAT,  PERRLA, No JVD  Heart:  RRR, no murmurs, gallops, or rubs.   Lungs:  CTA bilaterally, no wheeze, rales or rhonchi  Abd: bowel sounds present, nontender, nondistended, no masses  Extrem:  No clubbing, cyanosis, or edema    Disposition: home     Patient Condition at Discharge: Stable    Patient Instructions:      Medication List        START taking these medications      megestrol 40 MG/ML suspension  Commonly known as: MEGACE  Take 5 mLs by mouth daily  Start taking on: January 22, 2023     oxyCODONE HCl 10 MG immediate release tablet  Commonly known as: OXY-IR  Take 1 tablet by mouth every 4 hours as needed for Pain (hold for sedation) for up to 14 days. Max Daily Amount: 60 mg            CONTINUE taking these medications      CALCIUM CARBONATE PO     famotidine 20 MG tablet  Commonly known as: Pepcid  Take 1 tablet by mouth 2 times daily     fluticasone 50 MCG/ACT nasal spray  Commonly known as: FLONASE  1 spray by Each Nostril route in the morning and at bedtime     OLANZapine 5 MG tablet  Commonly known as: ZYPREXA     ondansetron 8 MG Tbdp disintegrating tablet  Commonly known as: ZOFRAN-ODT     pantoprazole 40 MG tablet  Commonly known as: PROTONIX     vitamin D3 25 MCG (1000 UT) Tabs tablet  Commonly known as: CHOLECALCIFEROL               Where to Get Your Medications        You can get these medications from any pharmacy    Bring a paper prescription for each of these medications  megestrol 40 MG/ML suspension  oxyCODONE HCl 10 MG immediate release tablet       Activity: activity as tolerated  Diet: regular diet    Pt has been advised to: Follow-up with Hafsa Puente DO in 1 week.   Follow-up with consultants as recommended by them    Note that over 30 minutes was spent in preparing discharge papers, discussing discharge with patient, medication review, etc.    Signed:  Dimitry Perdue MD  1/21/2023  3:32 PM

## 2023-01-23 LAB
BLOOD CULTURE, ROUTINE: NORMAL
CULTURE, BLOOD 2: NORMAL

## 2023-01-24 ENCOUNTER — HOSPITAL ENCOUNTER (OUTPATIENT)
Age: 63
Discharge: HOME | End: 2023-01-24
Payer: COMMERCIAL

## 2023-01-24 LAB
CALCIUM SERPL-MCNC: 7.4 MG/DL (ref 8.6–10.2)
CEA: 3.3 NG/ML (ref 0–5.2)

## 2023-01-24 PROCEDURE — 82105 ALPHA-FETOPROTEIN SERUM: CPT

## 2023-01-24 PROCEDURE — 82378 CARCINOEMBRYONIC ANTIGEN: CPT

## 2023-01-24 PROCEDURE — 82310 ASSAY OF CALCIUM: CPT

## 2023-01-24 PROCEDURE — 36415 COLL VENOUS BLD VENIPUNCTURE: CPT

## 2023-01-26 LAB — AFP-TUMOR MARKER: 12 NG/ML (ref 0–9)

## 2023-02-02 DIAGNOSIS — G89.3 PAIN DUE TO NEOPLASM: ICD-10-CM

## 2023-02-02 RX ORDER — OXYCODONE HYDROCHLORIDE 10 MG/1
10 TABLET ORAL EVERY 4 HOURS PRN
Qty: 42 TABLET | Refills: 0 | Status: SHIPPED | OUTPATIENT
Start: 2023-02-02 | End: 2023-02-09

## 2023-02-02 NOTE — TELEPHONE ENCOUNTER
Incoming call from [place] requesting refill of her pain medication. Pt New to Palliative Care outpatient. Advised pt staff will check with last prescriber, if not sure would need to go to ER. New patient appt 2/6/23 [facility] location.

## 2023-02-06 ENCOUNTER — OFFICE VISIT (OUTPATIENT)
Dept: PALLATIVE CARE | Age: 63
End: 2023-02-06
Payer: COMMERCIAL

## 2023-02-06 VITALS
BODY MASS INDEX: 28.51 KG/M2 | OXYGEN SATURATION: 100 % | HEART RATE: 82 BPM | DIASTOLIC BLOOD PRESSURE: 68 MMHG | TEMPERATURE: 97.8 F | WEIGHT: 146 LBS | SYSTOLIC BLOOD PRESSURE: 123 MMHG

## 2023-02-06 DIAGNOSIS — Z79.891 USE OF OPIATES FOR THERAPEUTIC PURPOSES: Primary | ICD-10-CM

## 2023-02-06 DIAGNOSIS — Z79.891 USE OF OPIATES FOR THERAPEUTIC PURPOSES: ICD-10-CM

## 2023-02-06 PROCEDURE — G8427 DOCREV CUR MEDS BY ELIG CLIN: HCPCS | Performed by: NURSE PRACTITIONER

## 2023-02-06 PROCEDURE — 99203 OFFICE O/P NEW LOW 30 MIN: CPT

## 2023-02-06 PROCEDURE — 1036F TOBACCO NON-USER: CPT | Performed by: NURSE PRACTITIONER

## 2023-02-06 PROCEDURE — G8484 FLU IMMUNIZE NO ADMIN: HCPCS | Performed by: NURSE PRACTITIONER

## 2023-02-06 PROCEDURE — 99215 OFFICE O/P EST HI 40 MIN: CPT | Performed by: NURSE PRACTITIONER

## 2023-02-06 PROCEDURE — 3017F COLORECTAL CA SCREEN DOC REV: CPT | Performed by: NURSE PRACTITIONER

## 2023-02-06 PROCEDURE — G8417 CALC BMI ABV UP PARAM F/U: HCPCS | Performed by: NURSE PRACTITIONER

## 2023-02-06 PROCEDURE — 1111F DSCHRG MED/CURRENT MED MERGE: CPT | Performed by: NURSE PRACTITIONER

## 2023-02-06 RX ORDER — SUCRALFATE 1 G/1
1 TABLET ORAL 4 TIMES DAILY
Qty: 120 TABLET | Refills: 3 | Status: SHIPPED | OUTPATIENT
Start: 2023-02-06

## 2023-02-06 NOTE — PROGRESS NOTES
Palliative Care Department  Provider: MAXIMUS Gee - CNP    Location of Service:   [facility]    Chief Complaint: Bethany Phoenix is a 66 y.o. female with chief complaint of pain, fatigue, poor appetite    Assessment/Plan      Metastatic neuroendocrine tumor:   -Found in October 2022, biopsies in November demonstrate metastatic carcinoma in liver biopsy in December shows well-defined neuroendocrine tumor grade 2   -Her care is currently managed at [facility] with Dr. Lambert Rubinstein   -Currently on octreotide LAR injection    Pain related to Neoplasm:   -  Oxycodone 10 mg Q 6 PRN    Nausea:   -  Zofran   -  Zyprexa   -  Compazine    Decreased Appetite/Dyspepsia:   -  Stop Megace 200 mg daily   -  Carafate 1 g 4 times daily   -  Protonix and Pepcid     Diarrhea:   - imodium    Follow Up:  4 weeks. They were encouraged to call with any questions, concerns, needs, or changes in symptoms. Subjective:     HPI:  Bethany Phoenix is a 66 y.o. female with significant medical history of metastatic neuroendocrine tumor affecting the lung, liver, as well as bones, originally found in October 2022, liver biopsy showing grade 2 well-defined neuroendocrine tumor in December 2022. Her care is primarily coordinated through the [facility], she is currently receiving octreotide LAR injections. She was referred to [address] [place] [address] for support and symptomatic management. Subjective/Events/Discussions:  Carolyn Yeung presents today accompanied by her granddaughter. She is alert and oriented able voice needs concerns well. She is familiar with the palliative medicine service, she was seen by palliative medicine service as an inpatient. We discussed her symptoms, her primary complaints are that of fatigue and weakness, decreased oral intake, pain in both her abdomen and back, as well as nausea and diarrhea.   After discussing for early most consistent issues she is experiencing is that of discomfort within her abdomen following eating. She states that immediately after eating she begins to have a gnawing discomfort across her upper abdomen, this often leads to nausea, but rarely vomiting. She also states that shortly after eating food she develops diarrhea. She states she has light-colored seedy soft to liquid stools 6-7 times per day, and the frequency of these increases related to the amount of food she eats. She does states she has continued to use some oxycodone, oftentimes only twice per day when her abdominal pain is most severe. This again is primarily following eating. For the most part she states that she has an appetite, is interested in food, but her decreased oral intake is primarily related to her being scared of developing abdominal pain, nausea, and having diarrhea. She had previously been prescribed Megace, which had not been having any para effect on her appetite. She has developed some lower extremity edema, is doubtful this is related to Megace, she recently had ultrasound of her lower extremities with right blood clots. We discussed this further, this is probably more related to her poor oral intake, low proteins and albumin, as well as her decreased physical activity, which I believe is resulted primarily in lower extremity dependent edema. After discussing great length, I believe she would primarily benefit from Carafate 1 g 4 times daily, and I recommend stopping Megace at this time. She does not require refill of pain medication, and I feel the need for ongoing narcotic use likely will decrease as her abdominal pain improves as well. She is agreeable to this plan at this point time, we will not make any other changes to her plan of care. We will have her return in 4 weeks to reassess her needs.      Pain Assessment   Ratin  Description: aching, sharp, and nawing  Duration:  weeks  Frequency: daily  Location: upper abdomen, back  Alleviating Factors: relaxation, bowel movement, and lying down  Exacerbating Factors: food ingestion  Effect: change in function, interference with activities, sleep, and mood    Past Medical History:   Diagnosis Date    Cancer ([address]     Hyperlipidemia     Hypertension     Prediabetes        Past Surgical History:   Procedure Laterality Date    COLONOSCOPY  01/09/2018    few diverticula--eugenia    CT BIOPSY PERCUTANEOUS SUPERFICIAL BONE  11/10/2022    CT BIOPSY PERCUTANEOUS SUPERFICIAL BONE 11/10/2022 [facility]    CT NEEDLE BIOPSY LIVER PERCUTANEOUS  12/8/2022    CT NEEDLE BIOPSY LIVER PERCUTANEOUS 12/8/2022 Karin Carvajal II, MD [facility]    GASTRIC BYPASS SURGERY  09/2019    UPPER GASTROINTESTINAL ENDOSCOPY  01/09/2018    mild gastritis; small hiatal hernia--eugenia       Family History   Problem Relation Age of Onset    Heart Attack Maternal Grandmother     Stroke Maternal Grandmother     Heart Attack Paternal Grandmother     Stroke Paternal Grandmother     Prostate Cancer Paternal Grandfather        ROS: UNLESS STATED ABOVE PATIENT DENIES:  CONSTITUTIONAL:  fever, chill, rigors, nausea, vomiting, fatigue. HEENT: blurry vision, double vision, hearing problem, tinnitus, hoarseness, dysphagia, odynophagia  RESPIRATORY: cough, shortness of breath, sputum expectoration. CARDIOVASCULAR:  Chest pain/pressure, palpitation, syncope, irregular beats  GASTROINTESTINAL:  abdominal or rectal pain, diarrhea, constipation, . GENITOURINARY:  Burning, frequency, urgency, incontinence, discharge  INTEGUMENTARY: rash, wound, pruritis  HEMATOLOGIC/LYMPHATIC:  Swelling, sores, gum bleeding, easy bruising, pica.   MUSCULOSKELETAL:  pain, edema, joint swelling or redness  NEUROLOGICAL:  light headed, dizziness, loss of consciousness, weakness, change in memory, seizures, tremors    Objective:     Physical Exam  /68   Pulse 82   Temp 97.8 °F (36.6 °C)   Wt 146 lb (66.2 kg)   LMP 01/10/2013   SpO2 100% Comment: RA  BMI 28.51 kg/m²     Gen:  Alert, appears stated age, well nourished, in no acute distress  HEENT:  Normocephalic, conjunctiva pink, no drainage, mucosa moist  Neck:  Supple  Lungs:  CTA bilaterally, no audible rhonchi or wheezes noted  Heart:  RRR, no murmur, rub, or gallop noted during exam  Abd:  Soft, upper abdominal tenderness, non distended, BS+  M/S/Ext:  Moving all extremities, no edema, pulses present  Skin:  Warm and dry  Neuro:  PERRL, Alert, oriented x 3; following commands    [place] Symptom Assessment Score   [place] Score 2/6/2023   Pain Score 5   Tiredness Score Worst possible tiredness   Nausea Score 5   Depression Score Not depressed   Anxiety Score 2   Drowsiness Score Not drowsy   Appetite Score Worst possible appetite   Wellbeing Score 8   Dyspnea Score No shortness of breath   Other Problem Score Best possible response   Total Assessment Score(calculated) 40     Assessed by: patient and provider. Current Medications:  Medications reviewed: yes    Opioid Risk Tool Scores    If Patient is Female If Patient is Male   Family History of Substance Abuse:     Alcohol [x] 1 [] 3   Illegal Drugs [] 2 [] 3   Prescription Drugs [] 4 [] 3    Personal History of Substance Abuse:     Alcohol [] 3 [] 3   Illegal Drugs [] 4 [] 4   Prescription Drugs [] 5 [] 5    Age between 12 and 39 [] 1 [] 1   History of Preadolescent Sexual Abuse [] 3 [] 0   Psychological Disease     ADD, OCD, Bipolar, or Schizophrenia [] 2 [] 2   Depression [] 1 [] 1   Total 1   Low Risk = 0-3          Moderate Risk = 4-7          High Risk = 8 and above         Controlled Substances Monitoring: [place] reviewed 2/6/23. RX Monitoring 2/6/2023   Periodic Controlled Substance Monitoring Possible medication side effects, risk of tolerance/dependence & alternative treatments discussed. ;No signs of potential drug abuse or diversion identified. ;Assessed functional status. ;Obtaining appropriate analgesic effect of treatment. ;Random urine drug screen sent today. Jamaica Carranza APRN - CNP  Palliative Medicine    Time/Communication  Greater than 50% of time spent, total 45 minutes in face-to-face counseling and coordination of care regarding goals of care, symptom management, diagnosis and prognosis, and see above. Discussed the plan of care with the other IDT members of the Palliative Care Team, and with consultants, Primary Attending, patient, family, and facility staff. Thank you for allowing Palliative Medicine to participate in the care of Armen Rosales. Note: This report was completed using [employer] voiced recognition software. Every effort has been made to ensure accuracy; however, inadvertent computerized transcription errors may be present.

## 2023-02-07 LAB
6-MAM, QUANTITATIVE, URINE: <10
6-MONOACETYLMORPHINE, URINE: NOT DETECTED
7-AMINOCLONAZEPAM, QUANTITATIVE, URINE: <50
ALCOHOL URINE: NOT DETECTED
ALPHA-HYDROXYALPRAZOLAM, QUANTITATIVE, URINE: <50
ALPHA-HYDROXYMIDAZOLAM, QUANTITATIVE, URINE: <50
ALPHA-HYDROXYTRIAZOLAM, QUANTITATIVE, URINE: <50
ALPRAZOLAM URINE QUANT: <50
AMPHETAMINE SCREEN, URINE: NOT DETECTED
BARBITURATE SCREEN URINE: NOT DETECTED
BENZODIAZEPINE SCREEN, URINE: NOT DETECTED
BUPRENORPHINE URINE: NOT DETECTED
CANNABINOID SCREEN URINE: NOT DETECTED
CHLORDIAZEPOXIDE, QUANTITATIVE, URINE: <50
CLONAZEPAM, QUANTITATIVE, URINE: <50
COCAINE METABOLITE SCREEN URINE: NOT DETECTED
CODEINE, QUANTITATIVE, URINE: <50
COMMENT: NORMAL
DIAZEPAM URINE QUANT: <50
FENTANYL SCREEN, URINE: NOT DETECTED
FLUNITRAZEPAM, QUANTITATIVE, URINE: <50
FLURAZEPAM, QUANTITATIVE, URINE: <50
HYDROCODONE, QUANTITATIVE, URINE: <50
HYDROMORPHONE, QUANTITATIVE, URINE: <50
INTEGRITY CHECK, CREATININE, URINE: 127.9
INTEGRITY CHECK, OXIDANT, URINE: <40
INTEGRITY CHECK, PH, URINE: 5.6 (ref 4.5–9)
INTEGRITY CHECK, SPECIFIC GRAVITY, URINE: 1.02 (ref 1–1.03)
INTEGRITY CHECK, SPECIMEN INTEGRITY, URINE: ABNORMAL
LORAZEPAM URINE QUANT: <50
Lab: ABNORMAL
METHADONE SCREEN, URINE: NOT DETECTED
MIDAZOLAM URINE QUANT: <50
MORPHINE, QUANTITATIVE, URINE: <50
NORDIAZEPAM URINE QUANT: <50
NORHYDROCODONE, QUANTITATIVE, URINE: <50
NOROXYCODONE, QUANTITATIVE, URINE: 369
OPIATE SCREEN URINE: NOT DETECTED
OXAZEPAM URINE QUANT: <50
OXYCODONE URINE, QUANTITATIVE: <50
OXYCODONE URINE: POSITIVE
OXYMORPHONE, QUANTITATIVE, URINE: 860.1
PHENCYCLIDINE SCREEN URINE: NOT DETECTED
TEMAZEPAM, QUANTITATIVE, URINE: <50
TRAMADOL SCREEN URINE: NOT DETECTED

## 2023-02-13 ENCOUNTER — OFFICE VISIT (OUTPATIENT)
Dept: PALLATIVE CARE | Age: 63
End: 2023-02-13
Payer: COMMERCIAL

## 2023-02-13 VITALS
OXYGEN SATURATION: 100 % | DIASTOLIC BLOOD PRESSURE: 70 MMHG | SYSTOLIC BLOOD PRESSURE: 110 MMHG | TEMPERATURE: 101.3 F | WEIGHT: 142 LBS | BODY MASS INDEX: 27.73 KG/M2 | HEART RATE: 105 BPM

## 2023-02-13 DIAGNOSIS — Z51.5 ENCOUNTER FOR PALLIATIVE CARE: Primary | ICD-10-CM

## 2023-02-13 PROCEDURE — G8428 CUR MEDS NOT DOCUMENT: HCPCS | Performed by: NURSE PRACTITIONER

## 2023-02-13 PROCEDURE — 99212 OFFICE O/P EST SF 10 MIN: CPT | Performed by: NURSE PRACTITIONER

## 2023-02-13 PROCEDURE — 1111F DSCHRG MED/CURRENT MED MERGE: CPT | Performed by: NURSE PRACTITIONER

## 2023-02-13 PROCEDURE — 3017F COLORECTAL CA SCREEN DOC REV: CPT | Performed by: NURSE PRACTITIONER

## 2023-02-13 PROCEDURE — G8484 FLU IMMUNIZE NO ADMIN: HCPCS | Performed by: NURSE PRACTITIONER

## 2023-02-13 PROCEDURE — 1036F TOBACCO NON-USER: CPT | Performed by: NURSE PRACTITIONER

## 2023-02-13 PROCEDURE — G8417 CALC BMI ABV UP PARAM F/U: HCPCS | Performed by: NURSE PRACTITIONER

## 2023-02-13 RX ORDER — DIPHENOXYLATE HYDROCHLORIDE AND ATROPINE SULFATE 2.5; .025 MG/1; MG/1
1 TABLET ORAL 3 TIMES DAILY
COMMUNITY
Start: 2023-01-24

## 2023-02-13 RX ORDER — UREA 10 %
1000 LOTION (ML) TOPICAL 2 TIMES DAILY
COMMUNITY
Start: 2023-01-12

## 2023-02-13 RX ORDER — MEGESTROL ACETATE 40 MG/ML
200 SUSPENSION ORAL DAILY
Qty: 240 ML | Refills: 0
Start: 2023-02-13

## 2023-02-13 RX ORDER — ONDANSETRON HYDROCHLORIDE 8 MG/1
8 TABLET, FILM COATED ORAL EVERY 8 HOURS PRN
Qty: 90 TABLET | Refills: 0 | Status: SHIPPED | OUTPATIENT
Start: 2023-02-13 | End: 2023-03-15

## 2023-02-13 RX ORDER — FUROSEMIDE 20 MG/1
1 TABLET ORAL DAILY PRN
COMMUNITY
Start: 2023-02-02

## 2023-02-13 RX ORDER — ONDANSETRON HYDROCHLORIDE 8 MG/1
8 TABLET, FILM COATED ORAL EVERY 8 HOURS PRN
COMMUNITY
Start: 2022-12-28 | End: 2023-02-13 | Stop reason: SDUPTHER

## 2023-02-13 RX ORDER — LOPERAMIDE HYDROCHLORIDE 2 MG/1
2 TABLET ORAL PRN
COMMUNITY
Start: 2023-01-11

## 2023-02-13 NOTE — PROGRESS NOTES
Palliative Care Department  Provider: MAXIMUS Unger - CNP    Location of Service:   [facility]    Chief Complaint: Elda Allen is a 66 y.o. female with chief complaint of pain, fatigue, poor appetite    Assessment/Plan      Metastatic neuroendocrine tumor:   -Found in 2022, biopsies in November demonstrate metastatic carcinoma in liver biopsy in December shows well-defined neuroendocrine tumor grade 2   -Her care is currently managed at [facility] [facility] with Dr. Angel Me   -Currently on octreotide LAR injection    Pain related to Neoplasm:   -  Oxycodone 10 mg Q 6 PRN    Nausea:   -  Zofran     Decreased Appetite/Dyspepsia:   -  restart Megace 200 mg daily   -  Carafate 1 g 4 times daily   -  Protonix and Pepcid     Diarrhea:   - imodium    Follow Up:  4 weeks. They were encouraged to call with any questions, concerns, needs, or changes in symptoms. Subjective:     HPI:  Elda Allen is a 66 y.o. female with significant medical history of metastatic neuroendocrine tumor affecting the lung, liver, as well as bones, originally found in 2022, liver biopsy showing grade 2 well-defined neuroendocrine tumor in 2022. Her care is primarily coordinated through the [facility] clinic, she is currently receiving octreotide LAR injections. She was referred to [address] [place] [address] for support and symptomatic management. Subjective/Events/Discussions:  Isai Angel presents today accompanied by her granddaughter, requesting to be seen again today with concern regarding her appetite. She states her abdominal discomfort and nausea have improved with carafate and protonix, and rarely needs oxycodone for pain, but since stopping megace her appetite has declined. We discussed further and she wants to restart megace. There are no other significant complaints at this time.      Pain Assessment   Ratin  Description: aching, sharp, and nawing  Duration:  weeks  Frequency: daily  Location: upper abdomen, back  Alleviating Factors: relaxation, bowel movement, and lying down  Exacerbating Factors: food ingestion  Effect: change in function, interference with activities, sleep, and mood    Past Medical History:   Diagnosis Date    Cancer ([address]     Hyperlipidemia     Hypertension     Prediabetes        Past Surgical History:   Procedure Laterality Date    COLONOSCOPY  01/09/2018    few diverticula--eugenia    CT BIOPSY PERCUTANEOUS SUPERFICIAL BONE  11/10/2022    CT BIOPSY PERCUTANEOUS SUPERFICIAL BONE 11/10/2022 [facility]    CT NEEDLE BIOPSY LIVER PERCUTANEOUS  12/8/2022    CT NEEDLE BIOPSY LIVER PERCUTANEOUS 12/8/2022 René Blanco II, MD [facility]    GASTRIC BYPASS SURGERY  09/2019    UPPER GASTROINTESTINAL ENDOSCOPY  01/09/2018    mild gastritis; small hiatal hernia--eugenia       Family History   Problem Relation Age of Onset    Heart Attack Maternal Grandmother     Stroke Maternal Grandmother     Heart Attack Paternal Grandmother     Stroke Paternal Grandmother     Prostate Cancer Paternal Grandfather        ROS: UNLESS STATED ABOVE PATIENT DENIES:  CONSTITUTIONAL:  fever, chill, rigors, nausea, vomiting, fatigue. HEENT: blurry vision, double vision, hearing problem, tinnitus, hoarseness, dysphagia, odynophagia  RESPIRATORY: cough, shortness of breath, sputum expectoration. CARDIOVASCULAR:  Chest pain/pressure, palpitation, syncope, irregular beats  GASTROINTESTINAL:  abdominal or rectal pain, diarrhea, constipation, . GENITOURINARY:  Burning, frequency, urgency, incontinence, discharge  INTEGUMENTARY: rash, wound, pruritis  HEMATOLOGIC/LYMPHATIC:  Swelling, sores, gum bleeding, easy bruising, pica.   MUSCULOSKELETAL:  pain, edema, joint swelling or redness  NEUROLOGICAL:  light headed, dizziness, loss of consciousness, weakness, change in memory, seizures, tremors    Objective:     Physical Exam  LMP 01/10/2013     Gen:  Alert, appears stated age, well nourished, in no acute distress  HEENT:  Normocephalic, conjunctiva pink, no drainage, mucosa moist  Neck:  Supple  Lungs:  CTA bilaterally, no audible rhonchi or wheezes noted  Heart:  RRR, no murmur, rub, or gallop noted during exam  Abd:  Soft, upper abdominal tenderness, non distended, BS+  M/S/Ext:  Moving all extremities, no edema, pulses present  Skin:  Warm and dry  Neuro:  PERRL, Alert, oriented x 3; following commands    [place] Symptom Assessment Score   [place] Score 2/13/2023 2/6/2023   Pain Score 4 5   Tiredness Score 8 Worst possible tiredness   Nausea Score 2 5   Depression Score 1 Not depressed   Anxiety Score 1 2   Drowsiness Score 4 Not drowsy   Appetite Score 9 Worst possible appetite   Wellbeing Score 5 8   Dyspnea Score No shortness of breath No shortness of breath   Other Problem Score Best possible response Best possible response   Total Assessment Score(calculated) 34 40     Assessed by: patient and provider. Current Medications:  Medications reviewed: yes    Opioid Risk Tool Scores    If Patient is Female If Patient is Male   Family History of Substance Abuse:     Alcohol [x] 1 [] 3   Illegal Drugs [] 2 [] 3   Prescription Drugs [] 4 [] 3    Personal History of Substance Abuse:     Alcohol [] 3 [] 3   Illegal Drugs [] 4 [] 4   Prescription Drugs [] 5 [] 5    Age between 12 and 39 [] 1 [] 1   History of Preadolescent Sexual Abuse [] 3 [] 0   Psychological Disease     ADD, OCD, Bipolar, or Schizophrenia [] 2 [] 2   Depression [] 1 [] 1   Total 1   Low Risk = 0-3          Moderate Risk = 4-7          High Risk = 8 and above         Controlled Substances Monitoring: [place]ARRS reviewed 2/13/23. RX Monitoring 2/6/2023   Periodic Controlled Substance Monitoring Possible medication side effects, risk of tolerance/dependence & alternative treatments discussed. ;No signs of potential drug abuse or diversion identified. ;Assessed functional status. ;Obtaining appropriate analgesic effect of treatment. ;Random urine drug screen sent today. Nayely Bran, APRN - CNP  Palliative Medicine    Time/Communication  Greater than 50% of time spent, total 15 minutes in face-to-face counseling and coordination of care regarding goals of care, symptom management, diagnosis and prognosis, and see above. Discussed the plan of care with the other IDT members of the Palliative Care Team, and with consultants, Primary Attending, patient, family, and facility staff. Thank you for allowing Palliative Medicine to participate in the care of Lakia Marlow. Note: This report was completed using [employer] voiced recognition software. Every effort has been made to ensure accuracy; however, inadvertent computerized transcription errors may be present.

## 2023-02-15 ENCOUNTER — TELEPHONE (OUTPATIENT)
Dept: PALLATIVE CARE | Age: 63
End: 2023-02-15

## 2023-02-15 NOTE — TELEPHONE ENCOUNTER
Driss Childs called asking for a referral for a dermatologist as she has changes on her skin, possibly form chemo. Called back, no answer, left message explaining we do not have a dermatologist in [facility]. She should let oncologist know that she has these changes and they may do referral for her.

## 2023-02-19 ENCOUNTER — HOSPITAL ENCOUNTER (EMERGENCY)
Age: 63
Discharge: HOME | End: 2023-02-20
Attending: EMERGENCY MEDICINE
Payer: COMMERCIAL

## 2023-02-19 ENCOUNTER — APPOINTMENT (OUTPATIENT)
Dept: GENERAL RADIOLOGY | Age: 63
End: 2023-02-19
Payer: COMMERCIAL

## 2023-02-19 ENCOUNTER — TELEPHONE (OUTPATIENT)
Dept: PALLATIVE CARE | Age: 63
End: 2023-02-19

## 2023-02-19 ENCOUNTER — APPOINTMENT (OUTPATIENT)
Dept: CT IMAGING | Age: 63
End: 2023-02-19
Payer: COMMERCIAL

## 2023-02-19 DIAGNOSIS — K76.9 LIVER LESION: ICD-10-CM

## 2023-02-19 DIAGNOSIS — E87.6 HYPOKALEMIA: ICD-10-CM

## 2023-02-19 DIAGNOSIS — R16.0 HEPATOMEGALY: ICD-10-CM

## 2023-02-19 DIAGNOSIS — R10.11 RIGHT UPPER QUADRANT ABDOMINAL PAIN: Primary | ICD-10-CM

## 2023-02-19 DIAGNOSIS — C79.9 METASTATIC MALIGNANT NEOPLASM, UNSPECIFIED SITE (HCC): ICD-10-CM

## 2023-02-19 LAB
ALBUMIN SERPL-MCNC: 2.3 G/DL (ref 3.5–5.2)
ALP BLD-CCNC: 386 U/L (ref 35–104)
ALT SERPL-CCNC: 25 U/L (ref 0–32)
ANION GAP SERPL CALCULATED.3IONS-SCNC: 12 MMOL/L (ref 7–16)
AST SERPL-CCNC: 59 U/L (ref 0–31)
BACTERIA: ABNORMAL /HPF
BASOPHILS ABSOLUTE: 0.04 E9/L (ref 0–0.2)
BASOPHILS RELATIVE PERCENT: 0.5 % (ref 0–2)
BILIRUB SERPL-MCNC: 0.5 MG/DL (ref 0–1.2)
BILIRUBIN URINE: NEGATIVE
BLOOD, URINE: NEGATIVE
BUN BLDV-MCNC: 10 MG/DL (ref 6–23)
CALCIUM SERPL-MCNC: 7.2 MG/DL (ref 8.6–10.2)
CHLORIDE BLD-SCNC: 107 MMOL/L (ref 98–107)
CLARITY: ABNORMAL
CO2: 17 MMOL/L (ref 22–29)
COLOR: YELLOW
CREAT SERPL-MCNC: 0.6 MG/DL (ref 0.5–1)
EOSINOPHILS ABSOLUTE: 0.07 E9/L (ref 0.05–0.5)
EOSINOPHILS RELATIVE PERCENT: 0.9 % (ref 0–6)
EPITHELIAL CELLS, UA: ABNORMAL /HPF
GFR SERPL CREATININE-BSD FRML MDRD: >60 ML/MIN/1.73
GLUCOSE BLD-MCNC: 99 MG/DL (ref 74–99)
GLUCOSE URINE: NEGATIVE MG/DL
HCT VFR BLD CALC: 23.5 % (ref 34–48)
HEMOGLOBIN: 8.2 G/DL (ref 11.5–15.5)
IMMATURE GRANULOCYTES #: 0.04 E9/L
IMMATURE GRANULOCYTES %: 0.5 % (ref 0–5)
KETONES, URINE: NEGATIVE MG/DL
LACTIC ACID: 1.8 MMOL/L (ref 0.5–2.2)
LEUKOCYTE ESTERASE, URINE: NEGATIVE
LIPASE: 15 U/L (ref 13–60)
LYMPHOCYTES ABSOLUTE: 1.7 E9/L (ref 1.5–4)
LYMPHOCYTES RELATIVE PERCENT: 21.7 % (ref 20–42)
MCH RBC QN AUTO: 24 PG (ref 26–35)
MCHC RBC AUTO-ENTMCNC: 34.9 % (ref 32–34.5)
MCV RBC AUTO: 68.9 FL (ref 80–99.9)
MONOCYTES ABSOLUTE: 0.56 E9/L (ref 0.1–0.95)
MONOCYTES RELATIVE PERCENT: 7.1 % (ref 2–12)
MUCUS: PRESENT /LPF
NEUTROPHILS ABSOLUTE: 5.44 E9/L (ref 1.8–7.3)
NEUTROPHILS RELATIVE PERCENT: 69.3 % (ref 43–80)
NITRITE, URINE: NEGATIVE
PDW BLD-RTO: 19.3 FL (ref 11.5–15)
PH UA: 6 (ref 5–9)
PLATELET # BLD: 394 E9/L (ref 130–450)
PMV BLD AUTO: 9.7 FL (ref 7–12)
POTASSIUM SERPL-SCNC: 3.2 MMOL/L (ref 3.5–5)
PROTEIN UA: 100 MG/DL
RBC # BLD: 3.41 E12/L (ref 3.5–5.5)
RBC UA: ABNORMAL /HPF (ref 0–2)
REASON FOR REJECTION: NORMAL
REJECTED TEST: NORMAL
SODIUM BLD-SCNC: 136 MMOL/L (ref 132–146)
SPECIFIC GRAVITY UA: 1.02 (ref 1–1.03)
TOTAL PROTEIN: 6.4 G/DL (ref 6.4–8.3)
TROPONIN, HIGH SENSITIVITY: 11 NG/L (ref 0–9)
UROBILINOGEN, URINE: 0.2 E.U./DL
WBC # BLD: 7.9 E9/L (ref 4.5–11.5)
WBC UA: ABNORMAL /HPF (ref 0–5)

## 2023-02-19 PROCEDURE — 80053 COMPREHEN METABOLIC PANEL: CPT

## 2023-02-19 PROCEDURE — 74176 CT ABD & PELVIS W/O CONTRAST: CPT

## 2023-02-19 PROCEDURE — 83690 ASSAY OF LIPASE: CPT

## 2023-02-19 PROCEDURE — 85025 COMPLETE CBC W/AUTO DIFF WBC: CPT

## 2023-02-19 PROCEDURE — 71045 X-RAY EXAM CHEST 1 VIEW: CPT

## 2023-02-19 PROCEDURE — 81001 URINALYSIS AUTO W/SCOPE: CPT

## 2023-02-19 PROCEDURE — 99285 EMERGENCY DEPT VISIT HI MDM: CPT

## 2023-02-19 PROCEDURE — 83605 ASSAY OF LACTIC ACID: CPT

## 2023-02-19 PROCEDURE — 2580000003 HC RX 258: Performed by: STUDENT IN AN ORGANIZED HEALTH CARE EDUCATION/TRAINING PROGRAM

## 2023-02-19 PROCEDURE — 96365 THER/PROPH/DIAG IV INF INIT: CPT

## 2023-02-19 PROCEDURE — 6370000000 HC RX 637 (ALT 250 FOR IP): Performed by: STUDENT IN AN ORGANIZED HEALTH CARE EDUCATION/TRAINING PROGRAM

## 2023-02-19 PROCEDURE — 96375 TX/PRO/DX INJ NEW DRUG ADDON: CPT

## 2023-02-19 PROCEDURE — 6360000002 HC RX W HCPCS: Performed by: STUDENT IN AN ORGANIZED HEALTH CARE EDUCATION/TRAINING PROGRAM

## 2023-02-19 PROCEDURE — 84484 ASSAY OF TROPONIN QUANT: CPT

## 2023-02-19 PROCEDURE — 96376 TX/PRO/DX INJ SAME DRUG ADON: CPT

## 2023-02-19 PROCEDURE — 93005 ELECTROCARDIOGRAM TRACING: CPT | Performed by: PHYSICIAN ASSISTANT

## 2023-02-19 PROCEDURE — 36415 COLL VENOUS BLD VENIPUNCTURE: CPT

## 2023-02-19 RX ORDER — FENTANYL CITRATE 50 UG/ML
25 INJECTION, SOLUTION INTRAMUSCULAR; INTRAVENOUS
Status: DISCONTINUED | OUTPATIENT
Start: 2023-02-19 | End: 2023-02-20 | Stop reason: HOSPADM

## 2023-02-19 RX ORDER — 0.9 % SODIUM CHLORIDE 0.9 %
1000 INTRAVENOUS SOLUTION INTRAVENOUS ONCE
Status: COMPLETED | OUTPATIENT
Start: 2023-02-19 | End: 2023-02-20

## 2023-02-19 RX ADMIN — POTASSIUM BICARBONATE 40 MEQ: 782 TABLET, EFFERVESCENT ORAL at 21:23

## 2023-02-19 RX ADMIN — FENTANYL CITRATE 25 MCG: 0.05 INJECTION, SOLUTION INTRAMUSCULAR; INTRAVENOUS at 21:10

## 2023-02-19 RX ADMIN — SODIUM CHLORIDE 1000 ML: 9 INJECTION, SOLUTION INTRAVENOUS at 21:09

## 2023-02-19 ASSESSMENT — PAIN SCALES - GENERAL: PAINLEVEL_OUTOF10: 7

## 2023-02-19 NOTE — TELEPHONE ENCOUNTER
Received call from patient requesting refill of pain meds and to let palliative medicine know that she has had a fever of 102 x 7 days. Patient states she has been taking acetaminophen however is requesting that palliative medicine call her in oxycodone-acetaminophen so that the acetaminophen content in the pill can further aid in bringing down her temperature. Explained that she has not been on oxycodone-acetaminophen since November and was recently called in a script for Oxy IR on 2/5. She states these are not helping bring down her temperature. Education provided on medication usage. Patient goes on to explain that she cannot leave her bed, is not eating, and has had decreased fluid intake. Advised patient to go to ER. She is in agreement and is calling her daughter for a ride.

## 2023-02-20 VITALS
RESPIRATION RATE: 14 BRPM | TEMPERATURE: 97.9 F | OXYGEN SATURATION: 100 % | HEART RATE: 99 BPM | SYSTOLIC BLOOD PRESSURE: 118 MMHG | DIASTOLIC BLOOD PRESSURE: 69 MMHG

## 2023-02-20 LAB
EKG ATRIAL RATE: 102 BPM
EKG P AXIS: 41 DEGREES
EKG P-R INTERVAL: 128 MS
EKG Q-T INTERVAL: 344 MS
EKG QRS DURATION: 64 MS
EKG QTC CALCULATION (BAZETT): 448 MS
EKG R AXIS: 30 DEGREES
EKG T AXIS: 39 DEGREES
EKG VENTRICULAR RATE: 102 BPM

## 2023-02-20 PROCEDURE — 2500000003 HC RX 250 WO HCPCS: Performed by: STUDENT IN AN ORGANIZED HEALTH CARE EDUCATION/TRAINING PROGRAM

## 2023-02-20 PROCEDURE — 93010 ELECTROCARDIOGRAM REPORT: CPT | Performed by: INTERNAL MEDICINE

## 2023-02-20 PROCEDURE — 6360000002 HC RX W HCPCS: Performed by: STUDENT IN AN ORGANIZED HEALTH CARE EDUCATION/TRAINING PROGRAM

## 2023-02-20 RX ADMIN — Medication 1000 MG: at 00:41

## 2023-02-20 RX ADMIN — FENTANYL CITRATE 25 MCG: 0.05 INJECTION, SOLUTION INTRAMUSCULAR; INTRAVENOUS at 00:37

## 2023-02-20 ASSESSMENT — PAIN SCALES - GENERAL
PAINLEVEL_OUTOF10: 3
PAINLEVEL_OUTOF10: 0
PAINLEVEL_OUTOF10: 5

## 2023-02-20 ASSESSMENT — PAIN DESCRIPTION - FREQUENCY
FREQUENCY: CONTINUOUS
FREQUENCY: INTERMITTENT

## 2023-02-20 ASSESSMENT — PAIN DESCRIPTION - LOCATION
LOCATION: ABDOMEN
LOCATION: ABDOMEN

## 2023-02-20 ASSESSMENT — PAIN DESCRIPTION - PAIN TYPE
TYPE: ACUTE PAIN
TYPE: ACUTE PAIN

## 2023-02-20 ASSESSMENT — PAIN - FUNCTIONAL ASSESSMENT: PAIN_FUNCTIONAL_ASSESSMENT: 0-10

## 2023-02-20 NOTE — ED NOTES
Department of Emergency Medicine  FIRST PROVIDER TRIAGE NOTE             Independent MLP           2/19/23  7:18 PM EST    Date of Encounter: 2/19/23   MRN: [id number]      HPI: Reno Valentino is a 66 y.o. female who presents to the ED for Abdominal Pain (RUQ off and on, pain increased last night that pt was not able to sleep, dizzy, not eating,  hx of Neuro endocrine CA)     ROS: Negative for cp, sob, or rash. PE: Gen Appearance/Constitutional: alert     Initial Plan of Care: All treatment areas with department are currently occupied. Plan to order/Initiate the following while awaiting opening in ED: labs, EKG, and imaging studies.   Initiate Treatment-Testing, Proceed toTreatment Area When Bed Available for ED Attending/MLP to Continue Care    Electronically signed by Taisha Frazier PA-C   DD: 2/19/23       Taisha Frazier PA-C  02/19/23 1914

## 2023-02-20 NOTE — ED NOTES
[facility] ENCOUNTER        Pt Name: Kris Zavaleta  MRN: [id number]  [place] 1960  Date of evaluation: 2/19/2023  Provider: Lore Graf MD  PCP: Cedric Bauman DO  Note Started: 8:51 PM EST 2/19/23    CHIEF COMPLAINT       Chief Complaint   Patient presents with    Abdominal Pain     RUQ off and on, pain increased last night that pt was not able to sleep, dizzy, not eating,  hx of Neuro endocrine CA       HISTORY OF PRESENT ILLNESS: 1 or more Elements        Limitations to history : None    Kris Zavaleta is a 66 y.o. 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 History has been obtained from patient who is a good historian. She mentioned, she was always having this diarrhea and abdominal pain since about diagnosed with cancer and follows up with palliative care. For last 1 week, her abdominal pain got worse; 6/10 abdominal pain, mostly in the right upper quadrant. Pain mostly radiates throughout the whole abdomen, nothing makes her pain better or worse. Pain is not associated with nausea, vomiting, constipation but her diarrhea also got worsened lately. She mentioned, it is mostly watery diarrhea, did not notice any mucus or blood with the stool. She takes megestrol for appetite, ordered by palliative care; which worse her diarrhea. She was also complaining, occasional lower abdominal pain, but did not notice any dysuria or polyuria. Also she noticed some color changes in her urine. Patient was also having fever and taking Tylenol at home. No other complain.      Nursing Notes were all reviewed and agreed with or any disagreements were addressed in the HPI. REVIEW OF EXTERNAL NOTE :         Controlled Substance Monitoring:    Acute and Chronic Pain Monitoring:   RX Monitoring 2/6/2023   Periodic Controlled Substance Monitoring Possible medication side effects, risk of tolerance/dependence & alternative treatments discussed. ;No signs of potential drug abuse or diversion identified. ;Assessed functional status. ;Obtaining appropriate analgesic effect of treatment. ;Random urine drug screen sent today. REVIEW OF SYSTEMS :      Positives and Pertinent negatives as per HPI. SURGICAL HISTORY     Past Surgical History:   Procedure Laterality Date    COLONOSCOPY  01/09/2018    few diverticula--eugenia    CT BIOPSY PERCUTANEOUS SUPERFICIAL BONE  11/10/2022    CT BIOPSY PERCUTANEOUS SUPERFICIAL BONE 11/10/2022 [facility]    CT NEEDLE BIOPSY LIVER PERCUTANEOUS  12/8/2022    CT NEEDLE BIOPSY LIVER PERCUTANEOUS 12/8/2022 MD [facility] II CT    GASTRIC BYPASS SURGERY  09/2019    UPPER GASTROINTESTINAL ENDOSCOPY  01/09/2018    mild gastritis; small hiatal hernia--eugenia       CURRENTMEDICATIONS       Previous Medications    CALCIUM CARBONATE (OS-DORY) 1250 (500 CA) MG CHEWABLE TABLET    Take 1,000 mg by mouth 2 times daily    CALCIUM CARBONATE ANTACID (CALCIUM CARBONATE PO)    Take 2 tablets by mouth 2 times daily as needed    DIPHENOXYLATE-ATROPINE (LOMOTIL) 2.5-0.025 MG PER TABLET    Take 1 tablet by mouth in the morning, at noon, and at bedtime.     FAMOTIDINE (PEPCID) 20 MG TABLET    Take 1 tablet by mouth 2 times daily    FLUTICASONE (FLONASE) 50 MCG/ACT NASAL SPRAY    1 spray by Each Nostril route in the morning and at bedtime    FUROSEMIDE (LASIX) 20 MG TABLET    Take 1 tablet by mouth daily as needed    LOPERAMIDE (IMODIUM A-D) 2 MG TABLET    Take 2 mg by mouth as needed    MEGESTROL (MEGACE) 40 MG/ML SUSPENSION    Take 5 mLs by mouth daily    OLANZAPINE (ZYPREXA) 5 MG TABLET    Take 5 mg by mouth nightly    ONDANSETRON (ZOFRAN) 8 MG TABLET    Take 1 tablet by mouth every 8 hours as needed for Nausea or Vomiting    ONDANSETRON (ZOFRAN-ODT) 8 MG TBDP DISINTEGRATING TABLET    Place 8 mg under the tongue in the morning, at noon, and at bedtime    PANTOPRAZOLE (PROTONIX) 40 MG TABLET    Take 40 mg by mouth in the morning and at bedtime    SUCRALFATE (CARAFATE) 1 GM TABLET    Take 1 tablet by mouth 4 times daily    VITAMIN D3 (CHOLECALCIFEROL) 25 MCG (1000 UT) TABS TABLET    Take 1,000 Units by mouth       ALLERGIES     Patient has no known allergies.     FAMILYHISTORY       Family History   Problem Relation Age of Onset    Heart Attack Maternal Grandmother     Stroke Maternal Grandmother     Heart Attack Paternal Grandmother     Stroke Paternal Grandmother     Prostate Cancer Paternal Grandfather         SOCIAL HISTORY       Social History     Tobacco Use    Smoking status: Former     Years: 10.00     Types: Cigarettes     Start date:      Quit date:      Years since quittin.    Smokeless tobacco: Never   Vaping Use    Vaping Use: Never used   Substance Use Topics    Alcohol use: No    Drug use: No       SCREENINGS        [place] Coma Scale  Eye Opening: Spontaneous  Best Verbal Response: Oriented  Best Motor Response: Obeys commands  [place] Coma Scale Score: 15                CIWA Assessment  BP: 118/69  Heart Rate: 99           PHYSICAL EXAM  1 or more Elements     ED Triage Vitals   BP Temp Temp Source Heart Rate Resp SpO2 Height Weight   23 -- --   114/74 97.9 °F (36.6 °C) Temporal (!) 111 16 98 %         Constitutional/General: Alert and oriented x3  Head: Normocephalic and atraumatic  Eyes: PERRL, EOMI, conjunctiva normal, sclera non icteric  ENT:  Oropharynx clear, handling secretions, no trismus, no asymmetry of the posterior oropharynx or uvular edema  Neck: Supple, full ROM, no stridor, no meningeal signs  Respiratory: Lungs clear to auscultation bilaterally, no wheezes, rales, or rhonchi. Not in respiratory distress  Cardiovascular:  Regular rate. Regular rhythm. No murmurs, no gallops, no rubs. 2+ distal pulses. Equal extremity pulses. Chest: No chest wall tenderness  GI:  Abdomen Soft, + tender in all four quadrants, Non distended. No rebound, guarding, or rigidity. No pulsatile masses. Musculoskeletal: Moves all extremities x 4. Warm and well perfused, no clubbing, no cyanosis, no edema. Capillary refill >3 seconds  Integument: skin warm and dry. No rashes.    Neurologic: GCS 15, no focal deficits, symmetric strength 5/5 in the upper and lower extremities bilaterally  Psychiatric: Normal Affect        DIAGNOSTIC RESULTS   LABS:    Labs Reviewed   COMPREHENSIVE METABOLIC PANEL - Abnormal; Notable for the following components:       Result Value    Potassium 3.2 (*)     CO2 17 (*)     Calcium 7.2 (*)     Albumin 2.3 (*)     Alkaline Phosphatase 386 (*)     AST 59 (*)     All other components within normal limits    Narrative:     CALL  Quiroz  H34 tel. ,  Rejected Test Name/Called to: Mayra Galindo, 02/19/2023 20:55, by [facility]   CBC WITH AUTO DIFFERENTIAL - Abnormal; Notable for the following components:    RBC 3.41 (*)     Hemoglobin 8.2 (*)     Hematocrit 23.5 (*)     MCV 68.9 (*)     MCH 24.0 (*)     MCHC 34.9 (*)     RDW 19.3 (*)     All other components within normal limits   URINALYSIS WITH MICROSCOPIC - Abnormal; Notable for the following components:    Protein,  (*)     Mucus, UA Present (*)     Bacteria, UA FEW (*)     All other components within normal limits   TROPONIN - Abnormal; Notable for the following components:    Troponin, High Sensitivity 11 (*)     All other components within normal limits   CULTURE, STOOL   CULTURE, URINE   LIPASE    Narrative:     CALL  Quiroz  H34 tel. ,  Rejected Test Name/Called to: Mayra Galindo, 02/19/2023 20:55, by [address] [place] [address] Narrative:     CALL  Quiroz  H34 tel. ,  Rejected Test Name/Called to: Dyann Gulling Aleta Kanner, 02/19/2023 20:55, by [facility]   TROPONIN   BASIC METABOLIC PANEL       As interpreted by me, the above displayed labs are abnormal. All other labs obtained during this visit were within normal range or not returned as of this dictation. RADIOLOGY:   Unless a wet read is documented in the ED course or MDM, non-plain film images such as CT, Ultrasound and MRI are read by the radiologist unless otherwise specified in the medical decision-making. Plain radiographic images are preliminarily interpreted by this ED Provider with the findings documented in the ED Course with official radiology read to follow. Interpretation per the Radiologist below, if available at the time of this note:    CT ABDOMEN PELVIS WO CONTRAST Additional Contrast? None   Final Result   1. No acute disease. 2. Normal appendix. 3. Diffuse metastatic infiltration of the markedly enlarged liver which   measures 24.9 cm in length. No intrahepatic biliary ductal dilatation. Normal size spleen. No stigmata to suggest portal vein occlusion. Trace   pelvic ascites. 4. Innumerable miliary sclerotic osseous metastatic lesions. No pathological   fracture. RECOMMENDATIONS:   Careful clinical correlation and follow up recommended. XR CHEST PORTABLE   Final Result   No acute process. Stable exam.           No results found. No results found. PAST MEDICAL HISTORY/Chronic Conditions Affecting Care      has a past medical history of Cancer ([address] Hyperlipidemia, Hypertension, and Prediabetes.      EMERGENCY DEPARTMENT COURSE    Vitals:    Vitals:    02/19/23 1906 02/19/23 1918 02/19/23 2125 02/20/23 0032   BP:  114/74 112/68 118/69   Pulse: (!) 111 (!) 110 82 99   Resp:  16  14   Temp: 97.9 °F (36.6 °C)   97.9 °F (36.6 °C)   TempSrc: Temporal   Oral   SpO2: 98% 100%  100%       Patient was given the following medications:  Medications fentaNYL (SUBLIMAZE) injection 25 mcg (25 mcg IntraVENous Given 2/20/23 0037)   0.9 % sodium chloride bolus (0 mLs IntraVENous Stopped 2/20/23 0146)   potassium bicarb-citric acid (EFFER-K) effervescent tablet 40 mEq (40 mEq Oral Given 2/19/23 2123)   calcium gluconate 1000 mg in sodium chloride 0.9% 100 mL IVPB (0 mg IntraVENous Stopped 2/20/23 0146)         Is this patient to be included in the SEP-1 Core Measure due to severe sepsis or septic shock? No Exclusion criteria - the patient is NOT to be included for SEP-1 Core Measure due to: Alternative explanation for abnormal labs/vitals that do not relate to sepsis, see MDM for further explanation    EKG Interpretation    Interpreted by emergency department physician    Rhythm: sinus tachycardia  Rate: 100-110  Axis: normal  Ectopy: none  Conduction: normal  ST Segments: normal   T Waves: non specific changes  Q Waves: none    Clinical Impression: sinus tachycardia    Erin Jensen MD          Medical Decision Making/Differential Diagnosis:    CC/HPI Summary, Social Determinants of health, Records Reviewed, DDx, testing done/not done, ED Course, Reassessment, disposition considerations/shared decision making with patient, consults, disposition:        ED Course as of 02/20/23 0150   Sun Feb 19, 2023 2049 Comprehensive Metabolic Panel []   1710 Comprehensive Metabolic Panel(!):    Sodium 136   Potassium 3.2(!)   Chloride 107   CO2 17(!)   Anion Gap 12   Glucose, Random 99   BUN,BUNPL 10   Creatinine 0.6   Est, Glom Filt Rate >60   CALCIUM, SERUM, 464880 7.2(!)   Total Protein 6.4   Albumin 2.3(!)   BILIRUBIN TOTAL 0.5   Alk Phos 386(!)   ALT 25   AST 59(!) []   2254 Potassium(!): 3.2 []   Mon Feb 20, 2023   8194 Basic Metabolic Panel []   0877 Basic Metabolic Panel []      ED Course User Index  [] Quentin Anaya MD       Medical Decision Making  Amount and/or Complexity of Data Reviewed  Labs: ordered.  Decision-making details documented in ED Course.  Radiology: ordered.  ECG/medicine tests: ordered.    Risk  Prescription drug management.      EKG Interpretation    Interpreted by emergency department physician    Rhythm: sinus tachycardia  Rate: tachycardia  Axis: normal  Ectopy: premature ventricular contractions (unifocal)  Conduction: normal  ST Segments: no acute change  T Waves: non specific changes  Q Waves: v2, v5, and v6    Clinical Impression: sinus tachycardia with PVCs    Antoine Emerson MD        CONSULTS:   None    Abdominal pain in the setting of neuroendocrine tumor metastasis to the liver  Diarrhea 2/2 neuroendocrine tumor  Hypokalemia 2/2 diarrhea      PROCEDURES   Unless otherwise noted below, none       I am the Primary Clinician of Record.    FINAL IMPRESSION      1. Right upper quadrant abdominal pain    2. Metastatic malignant neoplasm, unspecified site (HCC)    3. Hepatomegaly    4. Liver lesion    5. Hypokalemia          DISPOSITION/PLAN     DISPOSITION Discharge - Pending Orders Complete 02/20/2023 12:40:24 AM      PATIENT REFERRED TO:  Miesha Puente DO  [address] [address]  [phone]    Call       DISCHARGE MEDICATIONS:  New Prescriptions    No medications on file       DISCONTINUED MEDICATIONS:  Discontinued Medications    No medications on file              (Please note that portions of this note were completed with a voice recognition program.  Efforts were made to edit the dictations but occasionally words are mis-transcribed.)    Antoine Emerson MD (electronically signed)

## 2023-02-20 NOTE — ED PROVIDER NOTES
[facility] ENCOUNTER        Pt Name: Carma Cogan  MRN: [id number]  [place] 1960  Date of evaluation: 2/19/2023  Provider: Nazia Kumar DO  PCP: Javid Brennan DO  Note Started: 12:45 AM EST 2/23/23    CHIEF COMPLAINT       Chief Complaint   Patient presents with    Abdominal Pain     RUQ off and on, pain increased last night that pt was not able to sleep, dizzy, not eating,  hx of Neuro endocrine CA       HISTORY OF PRESENT ILLNESS: 1 or more Elements     Carma Cogan is a 66 y.o. 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 History has been obtained from patient who is a good historian. She mentioned, she was always having this diarrhea and abdominal pain since about diagnosed with cancer and follows up with palliative care. For last 1 week, her abdominal pain got worse; 6/10 abdominal pain, mostly in the right upper quadrant. Pain mostly radiates throughout the whole abdomen, nothing makes her pain better or worse. Pain is not associated with nausea, vomiting, constipation but her diarrhea also got worsened lately. She mentioned, it is mostly watery diarrhea, did not notice any mucus or blood with the stool. She takes megestrol for appetite, ordered by palliative care; which worse her diarrhea. She was also complaining, occasional lower abdominal pain, but did not notice any dysuria or polyuria. Also she noticed some color changes in her urine. Patient was also having fever and taking Tylenol at home. No other complain.    Nursing Notes were all reviewed and agreed with or any disagreements were addressed in the HPI. REVIEW OF SYSTEMS :      Review of Systems   Constitutional:  Negative for chills and fatigue. HENT:  Negative for congestion. Eyes:  Negative for redness. Respiratory:  Negative for shortness of breath. Cardiovascular:  Negative for chest pain. Gastrointestinal:  Positive for abdominal pain. Negative for nausea and vomiting. Genitourinary:  Negative for dysuria. Musculoskeletal:  Negative for arthralgias. Skin:  Negative for rash. Neurological:  Negative for light-headedness. Psychiatric/Behavioral:  Negative for confusion. All other systems reviewed and are negative. Positives and Pertinent negatives as per HPI. SURGICAL HISTORY     Past Surgical History:   Procedure Laterality Date    COLONOSCOPY  01/09/2018    few diverticula--eugenia    CT BIOPSY PERCUTANEOUS SUPERFICIAL BONE  11/10/2022    CT BIOPSY PERCUTANEOUS SUPERFICIAL BONE 11/10/2022 [facility]    CT NEEDLE BIOPSY LIVER PERCUTANEOUS  12/8/2022    CT NEEDLE BIOPSY LIVER PERCUTANEOUS 12/8/2022 Kristie Hendricks II, MD [facility]    GASTRIC BYPASS SURGERY  09/2019    UPPER GASTROINTESTINAL ENDOSCOPY  01/09/2018    mild gastritis; small hiatal hernia--eugenia       CURRENTMEDICATIONS       Discharge Medication List as of 2/20/2023 12:42 AM        CONTINUE these medications which have NOT CHANGED    Details   !! calcium carbonate (OS-DORY) 1250 (500 Ca) MG chewable tablet Take 1,000 mg by mouth 2 times dailyHistorical Med      diphenoxylate-atropine (LOMOTIL) 2.5-0.025 MG per tablet Take 1 tablet by mouth in the morning, at noon, and at bedtime. Historical Med      furosemide (LASIX) 20 MG tablet Take 1 tablet by mouth daily as neededHistorical Med      loperamide (IMODIUM A-D) 2 MG tablet Take 2 mg by mouth as neededHistorical Med      ondansetron (ZOFRAN) 8 MG tablet Take 1 tablet by mouth every 8 hours as needed for Nausea or Vomiting, Disp-90 tablet, R-0Normal      megestrol (MEGACE) 40 MG/ML suspension Take 5 mLs by mouth daily, Disp-240 mL, R-0NO PRINT      sucralfate (CARAFATE) 1 GM tablet Take 1 tablet by mouth 4 times daily, Disp-120 tablet, R-3Normal      ondansetron (ZOFRAN-ODT) 8 MG TBDP disintegrating tablet Place 8 mg under the tongue in the morning, at noon, and at bedtimeHistorical Med      pantoprazole (PROTONIX) 40 MG tablet Take 40 mg by mouth in the morning and at bedtimeHistorical Med      !! Calcium Carbonate Antacid (CALCIUM CARBONATE PO) Take 2 tablets by mouth 2 times daily as neededHistorical Med      vitamin D3 (CHOLECALCIFEROL) 25 MCG (1000 UT) TABS tablet Take 1,000 Units by mouthHistorical Med      OLANZapine (ZYPREXA) 5 MG tablet Take 5 mg by mouth nightlyHistorical Med      fluticasone (FLONASE) 50 MCG/ACT nasal spray 1 spray by Each Nostril route in the morning and at bedtime, Disp-16 g, R-0Normal      famotidine (PEPCID) 20 MG tablet Take 1 tablet by mouth 2 times daily, Disp-60 tablet, R-0Print       !! - Potential duplicate medications found. Please discuss with provider. ALLERGIES     Patient has no known allergies.     FAMILYHISTORY       Family History   Problem Relation Age of Onset    Heart Attack Maternal Grandmother     Stroke Maternal Grandmother     Heart Attack Paternal Grandmother     Stroke Paternal Grandmother     Prostate Cancer Paternal Grandfather         SOCIAL HISTORY       Social History     Tobacco Use    Smoking status: Former     Years: 10.00     Types: Cigarettes     Start date:      Quit date:      Years since quittin.1    Smokeless tobacco: Never   Vaping Use    Vaping Use: Never used   Substance Use Topics    Alcohol use: No    Drug use: No       SCREENINGS        [place] Coma Scale  Eye Opening: Spontaneous  Best Verbal Response: Oriented  Best Motor Response: Obeys commands  [place] Coma Scale Score: 15                CIWA Assessment  BP: 118/69  Heart Rate: 99           PHYSICAL EXAM  1 or more Elements     ED Triage Vitals BP Temp Temp Source Heart Rate Resp SpO2 Height Weight   02/19/23 1918 02/19/23 1906 02/19/23 1906 02/19/23 1906 02/19/23 1918 02/19/23 1906 -- --   114/74 97.9 °F (36.6 °C) Temporal (!) 111 16 98 %           Constitutional/General: Alert and oriented x3, well appearing, non toxic in NAD  Head: NC/AT  Eyes:  EOMI  Mouth: Oropharynx clear, handling secretions, no trismus  Neck: Supple, full ROM  Pulmonary: Lungs clear to auscultation bilaterally, no wheezes, rales, or rhonchi. Not in respiratory distress  Cardiovascular:  Regular rate and rhythm, no murmurs, gallops, or rubs. 2+ distal pulses  Abdomen: Moderately tender right upper quadrant no rebound, rigidity or guarding  Extremities: Moves all extremities x 4. Warm and well perfused  Skin: warm and dry without rash  Neurologic: GCS 15, no gross focal neurologic deficits  Psych: Normal Affect      DIAGNOSTIC RESULTS     I have personally reviewed all laboratory and imaging results for this patient. Results are listed below.      LABS:  Results for orders placed or performed during the hospital encounter of 02/19/23   Comprehensive Metabolic Panel   Result Value Ref Range    Sodium 136 132 - 146 mmol/L    Potassium 3.2 (L) 3.5 - 5.0 mmol/L    Chloride 107 98 - 107 mmol/L    CO2 17 (L) 22 - 29 mmol/L    Anion Gap 12 7 - 16 mmol/L    Glucose 99 74 - 99 mg/dL    BUN 10 6 - 23 mg/dL    Creatinine 0.6 0.5 - 1.0 mg/dL    Est, Glom Filt Rate >60 >=60 mL/min/1.73    Calcium 7.2 (L) 8.6 - 10.2 mg/dL    Total Protein 6.4 6.4 - 8.3 g/dL    Albumin 2.3 (L) 3.5 - 5.2 g/dL    Total Bilirubin 0.5 0.0 - 1.2 mg/dL    Alkaline Phosphatase 386 (H) 35 - 104 U/L    ALT 25 0 - 32 U/L    AST 59 (H) 0 - 31 U/L   CBC with Auto Differential   Result Value Ref Range    WBC 7.9 4.5 - 11.5 E9/L    RBC 3.41 (L) 3.50 - 5.50 E12/L    Hemoglobin 8.2 (L) 11.5 - 15.5 g/dL    Hematocrit 23.5 (L) 34.0 - 48.0 %    MCV 68.9 (L) 80.0 - 99.9 fL    MCH 24.0 (L) 26.0 - 35.0 pg    MCHC 34.9 (H) 32.0 - 34.5 % RDW 19.3 (H) 11.5 - 15.0 fL    Platelets 438 205 - 110 E9/L    MPV 9.7 7.0 - 12.0 fL    Neutrophils % 69.3 43.0 - 80.0 %    Immature Granulocytes % 0.5 0.0 - 5.0 %    Lymphocytes % 21.7 20.0 - 42.0 %    Monocytes % 7.1 2.0 - 12.0 %    Eosinophils % 0.9 0.0 - 6.0 %    Basophils % 0.5 0.0 - 2.0 %    Neutrophils Absolute 5.44 1.80 - 7.30 E9/L    Immature Granulocytes # 0.04 E9/L    Lymphocytes Absolute 1.70 1.50 - 4.00 E9/L    Monocytes Absolute 0.56 0.10 - 0.95 E9/L    Eosinophils Absolute 0.07 0.05 - 0.50 E9/L    Basophils Absolute 0.04 0.00 - 0.20 E9/L   Lipase   Result Value Ref Range    Lipase 15 13 - 60 U/L   Urinalysis with Microscopic   Result Value Ref Range    Color, UA Yellow Straw/Yellow    Clarity, UA SL CLOUDY Clear    Glucose, Ur Negative Negative mg/dL    Bilirubin Urine Negative Negative    Ketones, Urine Negative Negative mg/dL    Specific Gravity, UA 1.025 1.005 - 1.030    Blood, Urine Negative Negative    pH, UA 6.0 5.0 - 9.0    Protein,  (A) Negative mg/dL    Urobilinogen, Urine 0.2 <2.0 E.U./dL    Nitrite, Urine Negative Negative    Leukocyte Esterase, Urine Negative Negative    Mucus, UA Present (A) None Seen /LPF    WBC, UA 1-3 0 - 5 /HPF    RBC, UA NONE 0 - 2 /HPF    Epithelial Cells, UA FEW /HPF    Bacteria, UA FEW (A) None Seen /HPF   Lactic Acid   Result Value Ref Range    Lactic Acid 1.8 0.5 - 2.2 mmol/L   SPECIMEN REJECTION   Result Value Ref Range    Rejected Test trp5     Reason for Rejection see below    Troponin   Result Value Ref Range    Troponin, High Sensitivity 11 (H) 0 - 9 ng/L   EKG 12 Lead   Result Value Ref Range    Ventricular Rate 102 BPM    Atrial Rate 102 BPM    P-R Interval 128 ms    QRS Duration 64 ms    Q-T Interval 344 ms    QTc Calculation (Bazett) 448 ms    P Axis 41 degrees    R Axis 30 degrees    T Axis 39 degrees       RADIOLOGY:  Interpreted by Radiologist.  CT ABDOMEN PELVIS WO CONTRAST Additional Contrast? None   Final Result   1. No acute disease. 2. Normal appendix. 3. Diffuse metastatic infiltration of the markedly enlarged liver which   measures 24.9 cm in length. No intrahepatic biliary ductal dilatation. Normal size spleen. No stigmata to suggest portal vein occlusion. Trace   pelvic ascites. 4. Innumerable miliary sclerotic osseous metastatic lesions. No pathological   fracture. RECOMMENDATIONS:   Careful clinical correlation and follow up recommended. XR CHEST PORTABLE   Final Result   No acute process. Stable exam.               RADIOLOGY:   Non-plain film images such as CT, Ultrasound and MRI are read by the radiologist. Plain radiographic images are visualized and preliminarily interpreted by the ED Provider       Interpretation per the Radiologist below, if available at the time of this note:    CT ABDOMEN PELVIS WO CONTRAST Additional Contrast? None   Final Result   1. No acute disease. 2. Normal appendix. 3. Diffuse metastatic infiltration of the markedly enlarged liver which   measures 24.9 cm in length. No intrahepatic biliary ductal dilatation. Normal size spleen. No stigmata to suggest portal vein occlusion. Trace   pelvic ascites. 4. Innumerable miliary sclerotic osseous metastatic lesions. No pathological   fracture. RECOMMENDATIONS:   Careful clinical correlation and follow up recommended. XR CHEST PORTABLE   Final Result   No acute process. Stable exam.           CT ABDOMEN PELVIS WO CONTRAST Additional Contrast? None    Result Date: 2/19/2023  EXAMINATION: CT OF THE ABDOMEN AND PELVIS WITHOUT CONTRAST 2/19/2023 11:04 pm TECHNIQUE: CT of the abdomen and pelvis was performed without the administration of intravenous contrast. Multiplanar reformatted images are provided for review. Automated exposure control, iterative reconstruction, and/or weight based adjustment of the mA/kV was utilized to reduce the radiation dose to as low as reasonably achievable. COMPARISON: None.  HISTORY: ORDERING SYSTEM PROVIDED HISTORY: history of neuroendocrine tumor, metastasis to the liver and iliac bone. TECHNOLOGIST PROVIDED HISTORY: Reason for exam:->history of neuroendocrine tumor, metastasis to the liver and iliac bone. Additional Contrast?->None What reading provider will be dictating this exam?->CRC FINDINGS: Lower Chest: No infiltrate or effusion. Organs: Diffuse metastatic infiltration of the markedly enlarged liver which measures 24.9 cm in length. No intrahepatic biliary ductal dilatation. Normal size spleen. No stigmata to suggest portal vein occlusion. Otherwise, the bilateral adrenal glands, bilateral kidneys, spleen and pancreas are normal in unenhanced appearance. GI/Bowel: Stigmata of Mickey-en-Y gastric bypass. No concerning features. No ileus or obstruction. No inflammatory bowel process. Normal appendix right lower quadrant Pelvis: No adnexal mass. Trace intrapelvic free fluid. Peritoneum/Retroperitoneum: Trace intrapelvic free fluid. No hernia. No periaortic or pelvic adenopathy. Bones/Soft Tissues: Innumerable miliary sclerotic osseous metastatic lesions diffusely through the entire visible skeletal system. The largest confluent lesion is present within S1 measuring 3.9 cm. No pathological fracture. 1. No acute disease. 2. Normal appendix. 3. Diffuse metastatic infiltration of the markedly enlarged liver which measures 24.9 cm in length. No intrahepatic biliary ductal dilatation. Normal size spleen. No stigmata to suggest portal vein occlusion. Trace pelvic ascites. 4. Innumerable miliary sclerotic osseous metastatic lesions. No pathological fracture. RECOMMENDATIONS: Careful clinical correlation and follow up recommended.      XR CHEST PORTABLE    Result Date: 2/19/2023  EXAMINATION: ONE XRAY VIEW OF THE CHEST 2/19/2023 8:05 pm COMPARISON: Chest two view, 01/17/2023 HISTORY: ORDERING SYSTEM PROVIDED HISTORY: dizziness TECHNOLOGIST PROVIDED HISTORY: Reason for exam:->dizziness What reading provider will be dictating this exam?->CRC FINDINGS: Lines, tubes, and devices: None. Lungs and pleura: No focal consolidation. No pleural effusion. No pneumothorax. Cardiomediastinal silhouette: The heart size is normal. Bones and soft tissues: Unremarkable. No acute process. Stable exam.       No results found. PROCEDURES   Unless otherwise noted below, none       MDM:   I am the Primary Clinician of Record. FINAL IMPRESSION      1. Right upper quadrant abdominal pain    2. Metastatic malignant neoplasm, unspecified site ([address]    3. Hepatomegaly    4. Liver lesion    5. Hypokalemia          DISPOSITION/PLAN      Decision To Discharge 02/20/2023 02:02:27 AM    Patient's disposition: Discharge to home  Patient's condition is stable. (Please note that portions of this note were completed with a voice recognition program.  Efforts were made to edit the dictations but occasionally words are mis-transcribed.)      ATTENDING PROVIDER ATTESTATION:     I,  Dr. Staci Vernon am the primary physician of record for this patient. Mariel Michel presented to the emergency department for evaluation of Abdominal Pain (RUQ off and on, pain increased last night that pt was not able to sleep, dizzy, not eating,  hx of Neuro endocrine CA)   and was initially evaluated by the Medical Resident. See Original ED Note for H&P and ED course above. I have reviewed and discussed the case, including pertinent history (medical, surgical, family and social) and exam findings with the Medical Resident assigned to Mariel Michel. I have personally performed and/or participated in the history, exam, medical decision making, and procedures and agree with all pertinent clinical information. If an EKG was performed I did review the EKG and did discuss the interpretation with the resident. I do agree with the residents interpretation. I, Dr. Staci Vernon am the primary physician of record. Kristal Hayward is a 66 y.o. female who presents to the ED for abdominal pain  Vital signs upon arrival /69   Pulse 99   Temp 97.9 °F (36.6 °C) (Oral)   Resp 14   LMP 01/10/2013   SpO2 100%     MDM:   History From: The patient    Independent historian: None    Limitations: None    History: The patient is presenting emergency department with a chief complaint of abdominal pain. The patient states that she began over the last week with abdominal pain. The pain is located in the right upper quadrant. It radiates throughout her abdomen. Is moderate in severity. Worse with palpation. Nothing makes it better. No treatment prior to arrival.  The patient is currently on chemotherapy for neuroendocrine tumor. She follows at [facility]. She does follow locally with palliative care. Physical exam: Patient sitting in the bed no acute distress. Heart regular rate and rhythm, lungs clear to auscultation bilaterally abdomen soft, mildly tender right upper quadrant, no rebound, rigidity or guarding    Differential diagnosis includes but not limited to: Tumor burden  Pancreatitis-CT negative  Colitis-CT negative  Appendicitis-CT negative  Acute cholecystitis-CT negative    Records reviews: Reviewed patient's prior visit with palliative care Braden Smith NP on 2/13/2023 patient does follow with palliative care for neuroendocrine tumor.       Patient treated with  Medications   0.9 % sodium chloride bolus (0 mLs IntraVENous Stopped 2/20/23 0146)   potassium bicarb-citric acid (EFFER-K) effervescent tablet 40 mEq (40 mEq Oral Given 2/19/23 2123)   calcium gluconate 1000 mg in sodium chloride 0.9% 100 mL IVPB (0 mg IntraVENous Stopped 2/20/23 0146)         Labs independently interpreted and reviewed by me demonstrate :  CBC-mild anemia hemoglobin 8.3 which is near baseline  CMP-mild hypokalemia potassium 3.3 which was replaced  High-sensitivity troponin 11-patient refused any further blood draw    Imaging reviewed and interpreted by me demonstrates;  CBC demonstrates diffuse metastatic infiltration of the liver    EKG: This EKG is signed and interpreted by me. So you just sinus tachycardia rate of 102, no ST segment elevation or depression, AL interval 128 MS, QRS 68 MS, QTc 448 ms        Social determinants: Patient currently on chemotherapy for neuroendocrine tumor    Chronic conditions:   Past Medical History:   Diagnosis Date    Cancer ([address]     Hyperlipidemia     Hypertension     Prediabetes            Risk/Disposition: Present emergency department the chief complaint of abdominal pain. The patient did have labs and imaging which were reviewed. The patient did have CBC fairly unremarkable aside mild anemia on her baseline, CMP with mild hypokalemia which was replaced. High-sensitivity troponin 11, no ischemic changes on EKG. The patient did have CT abdomen pelvis demonstrating diffuse metastatic disease with no acute process patient refused additional blood draw. She will be discharged and follow-up outpatient. PATIENT REFERRED TO:  Schuyler Hernandez DO  [address] [place] [address]  [phone]    Call       DISCHARGE MEDICATIONS:  Discharge Medication List as of 2/20/2023 12:42 AM          DISCONTINUED MEDICATIONS:  Discharge Medication List as of 2/20/2023 12:42 AM           I have reviewed my findings and recommendations with the assigned Medical Resident, Cesar Marques and members of family present at the time of disposition. My findings/plan: The primary encounter diagnosis was Right upper quadrant abdominal pain. Diagnoses of Metastatic malignant neoplasm, unspecified site [facility]), Hepatomegaly, Liver lesion, and Hypokalemia were also pertinent to this visit.   Discharge Medication List as of 2/20/2023 12:42 AM        DO Debbie Carrizales DO  02/20/23 2000 Lizz Chaney DO  02/23/23 8459

## 2023-02-20 NOTE — ED NOTES
Patient verbalized understanding of dc and follow up orders as well as medication administration. Patient refused additional blood draw per she didn't want to wait any longer to be d/c. Iv discontinued, catheter intact, no signs of infiltration or adverse effects. Patient ambulated and walked out of ed, no further questions, nad.       Christian Andrea RN  02/20/23 5941

## 2023-02-23 RX ORDER — MEGESTROL ACETATE 40 MG/ML
SUSPENSION ORAL
Qty: 240 ML | Refills: 0 | OUTPATIENT
Start: 2023-02-23

## 2023-02-23 ASSESSMENT — ENCOUNTER SYMPTOMS
NAUSEA: 0
VOMITING: 0
EYE REDNESS: 0
ABDOMINAL PAIN: 1
SHORTNESS OF BREATH: 0

## 2023-02-28 ENCOUNTER — TELEPHONE (OUTPATIENT)
Dept: PALLATIVE CARE | Age: 63
End: 2023-02-28

## 2023-02-28 NOTE — TELEPHONE ENCOUNTER
Call from Mary stating she is still not feeling well. Called back and spoke with Mary. Mary states she continues to have pain, and nausea. Reviewed medications and when she is taking them. Mary states she has not taken a pain pill ( Oxy IR 10 mg) for a day and a half. She states last time she took one she felt dizzy and light headed. Mary also states she is not eating much . Encouraged Mary to take Zofran first thing in the morning. Then try to eat something and take her Oxy IR . Mary verbalizes understanding and will try this. Mary also states she has a PET Scan in a few days. Encouraged her to keep that appointment. Next mitch 3/6/23 with Palliative Care.

## 2023-03-02 RX ORDER — MEGESTROL ACETATE 40 MG/ML
200 SUSPENSION ORAL DAILY
Qty: 240 ML | Refills: 0 | Status: SHIPPED | OUTPATIENT
Start: 2023-03-02

## 2023-03-02 NOTE — TELEPHONE ENCOUNTER
Please resend prescription for Megace suspension as it did not go through to pharmacy the first time. Pharmacy is [facility] on [address]. Gene Abraham called, states she continues to have diarrhea. Reviewed how she is taking er medications. Gene Abraham states she has not been taking imodium. Instructed Gene Abraham on how to take Imodium and alternate with Lomotil. Gene Abraham verbalizes understanding. Gene Abraham confirms appointment for next week 3.6. 23.

## 2023-03-07 ENCOUNTER — TELEPHONE (OUTPATIENT)
Dept: PALLATIVE CARE | Age: 63
End: 2023-03-07

## 2023-03-08 ENCOUNTER — TELEPHONE (OUTPATIENT)
Dept: PALLATIVE CARE | Age: 63
End: 2023-03-08

## 2023-03-08 NOTE — TELEPHONE ENCOUNTER
Incoming call from [place] requesting return call from [facility] Vu Mendez. Returned call to patient, no answer, unable to leave voice message.

## 2023-03-08 NOTE — TELEPHONE ENCOUNTER
Jt Wills called and spoke with staff regarding her medications. She expressed that her doctor at the [facility] had changed her anti diarrhea medications and now would like to review her whole med list. Staff attempted to go over list on the phone with patient, she made the statement that she was overwhelmed and felt she was taking too many medications. Jt Wills is currently using a pill organizer for her medications. She states she lives with her  who also has health issues and had no one to help her. Patient was rescheduled for her Palliative Care appt but also offered to come into office on 3/9/23 at 1 pm to talk with MA or RN and bring her medication bottles and organizers with her. She expressed gratitude and accepted.

## 2023-03-09 ENCOUNTER — TELEPHONE (OUTPATIENT)
Dept: PALLATIVE CARE | Age: 63
End: 2023-03-09

## 2023-03-09 RX ORDER — POTASSIUM CHLORIDE 20 MEQ/1
1 TABLET, EXTENDED RELEASE ORAL DAILY
COMMUNITY
Start: 2023-03-08

## 2023-03-09 RX ORDER — CALCIUM CARBONATE 200(500)MG
2 TABLET,CHEWABLE ORAL 2 TIMES DAILY
COMMUNITY
Start: 2023-02-11

## 2023-03-09 RX ORDER — TELOTRISTAT ETHYL 250 MG/1
1 TABLET ORAL 3 TIMES DAILY
COMMUNITY
Start: 2023-03-07

## 2023-03-09 RX ORDER — PROMETHAZINE HYDROCHLORIDE 25 MG/1
TABLET ORAL
COMMUNITY
Start: 2023-02-11

## 2023-03-09 NOTE — TELEPHONE ENCOUNTER
Driss Childs came into the office today to go through her medications. Present was Palliative Care MA and RN, Driss Childs and her granddaughter Williams Gaucher. Together staff compared her bottles of medication with her current medication list in [facility]. Rn reviewed with patient what each medication was for and how often he can or should be taken. A hand written list was given to the patient with times and which medications to take at that time. All PRN meds were listed to the side and titled as needed. Pt expressed gratitude. Driss Childs stated she had no more questions about her medications before leaving. Encouraged pt to call with any further questions or concerns. Accessed Care Everywhere and printed/scanned last office note from Oncologist Dr Memo Merino.  Pt next Palliative appt  3/20/2023

## 2023-03-18 ENCOUNTER — APPOINTMENT (OUTPATIENT)
Dept: GENERAL RADIOLOGY | Age: 63
DRG: 469 | End: 2023-03-18
Payer: COMMERCIAL

## 2023-03-18 ENCOUNTER — APPOINTMENT (OUTPATIENT)
Dept: CT IMAGING | Age: 63
DRG: 469 | End: 2023-03-18
Payer: COMMERCIAL

## 2023-03-18 ENCOUNTER — HOSPITAL ENCOUNTER (INPATIENT)
Age: 63
LOS: 8 days | Discharge: ANOTHER HOSPITAL | DRG: 469 | End: 2023-03-26
Attending: STUDENT IN AN ORGANIZED HEALTH CARE EDUCATION/TRAINING PROGRAM | Admitting: INTERNAL MEDICINE
Payer: COMMERCIAL

## 2023-03-18 DIAGNOSIS — N17.9 AKI (ACUTE KIDNEY INJURY) (HCC): Primary | ICD-10-CM

## 2023-03-18 DIAGNOSIS — C22.8 LIVER CANCER, PRIMARY, WITH METASTASIS FROM LIVER TO OTHER SITE (HCC): ICD-10-CM

## 2023-03-18 DIAGNOSIS — R62.7 FAILURE TO THRIVE IN ADULT: ICD-10-CM

## 2023-03-18 LAB
ALBUMIN SERPL-MCNC: 2.3 G/DL (ref 3.5–5.2)
ALP SERPL-CCNC: 630 U/L (ref 35–104)
ALT SERPL-CCNC: 31 U/L (ref 0–32)
ANION GAP SERPL CALCULATED.3IONS-SCNC: 16 MMOL/L (ref 7–16)
ANION GAP SERPL CALCULATED.3IONS-SCNC: 16 MMOL/L (ref 7–16)
ANISOCYTOSIS: ABNORMAL
AST SERPL-CCNC: 99 U/L (ref 0–31)
BASOPHILIC STIPPLING: ABNORMAL
BASOPHILS # BLD: 0.05 E9/L (ref 0–0.2)
BASOPHILS NFR BLD: 0.6 % (ref 0–2)
BILIRUB DIRECT SERPL-MCNC: <0.2 MG/DL (ref 0–0.3)
BILIRUB INDIRECT SERPL-MCNC: ABNORMAL MG/DL (ref 0–1)
BILIRUB SERPL-MCNC: 0.4 MG/DL (ref 0–1.2)
BUN SERPL-MCNC: 22 MG/DL (ref 6–23)
BUN SERPL-MCNC: 23 MG/DL (ref 6–23)
BURR CELLS: ABNORMAL
CALCIUM SERPL-MCNC: 8.4 MG/DL (ref 8.6–10.2)
CALCIUM SERPL-MCNC: 8.6 MG/DL (ref 8.6–10.2)
CHLORIDE SERPL-SCNC: 118 MMOL/L (ref 98–107)
CHLORIDE SERPL-SCNC: 118 MMOL/L (ref 98–107)
CO2 SERPL-SCNC: 12 MMOL/L (ref 22–29)
CO2 SERPL-SCNC: 7 MMOL/L (ref 22–29)
CREAT SERPL-MCNC: 1.4 MG/DL (ref 0.5–1)
CREAT SERPL-MCNC: 1.6 MG/DL (ref 0.5–1)
EKG ATRIAL RATE: 116 BPM
EKG P AXIS: 55 DEGREES
EKG P-R INTERVAL: 128 MS
EKG Q-T INTERVAL: 312 MS
EKG QRS DURATION: 62 MS
EKG QTC CALCULATION (BAZETT): 433 MS
EKG R AXIS: 40 DEGREES
EKG T AXIS: 15 DEGREES
EKG VENTRICULAR RATE: 116 BPM
EOSINOPHIL # BLD: 0.03 E9/L (ref 0.05–0.5)
EOSINOPHIL NFR BLD: 0.4 % (ref 0–6)
ERYTHROCYTE [DISTWIDTH] IN BLOOD BY AUTOMATED COUNT: 20.7 FL (ref 11.5–15)
GLUCOSE SERPL-MCNC: 101 MG/DL (ref 74–99)
GLUCOSE SERPL-MCNC: 112 MG/DL (ref 74–99)
HCT VFR BLD AUTO: 29.1 % (ref 34–48)
HGB BLD-MCNC: 9.3 G/DL (ref 11.5–15.5)
IMM GRANULOCYTES # BLD: 0.04 E9/L
IMM GRANULOCYTES NFR BLD: 0.5 % (ref 0–5)
LACTATE BLDV-SCNC: 3.6 MMOL/L (ref 0.5–2.2)
LACTATE BLDV-SCNC: 3.6 MMOL/L (ref 0.5–2.2)
LIPASE: 25 U/L (ref 13–60)
LYMPHOCYTES # BLD: 1.32 E9/L (ref 1.5–4)
LYMPHOCYTES NFR BLD: 17 % (ref 20–42)
MAGNESIUM SERPL-MCNC: 1.8 MG/DL (ref 1.6–2.6)
MCH RBC QN AUTO: 25.1 PG (ref 26–35)
MCHC RBC AUTO-ENTMCNC: 32 % (ref 32–34.5)
MCV RBC AUTO: 78.4 FL (ref 80–99.9)
MONOCYTES # BLD: 0.58 E9/L (ref 0.1–0.95)
MONOCYTES NFR BLD: 7.5 % (ref 2–12)
NEUTROPHILS # BLD: 5.73 E9/L (ref 1.8–7.3)
NEUTS SEG NFR BLD: 74 % (ref 43–80)
OVALOCYTES: ABNORMAL
PLATELET # BLD AUTO: 463 E9/L (ref 130–450)
PMV BLD AUTO: 11.2 FL (ref 7–12)
POIKILOCYTES: ABNORMAL
POTASSIUM SERPL-SCNC: 3.1 MMOL/L (ref 3.5–5)
POTASSIUM SERPL-SCNC: 4.8 MMOL/L (ref 3.5–5)
PROT SERPL-MCNC: 6.9 G/DL (ref 6.4–8.3)
RBC # BLD AUTO: 3.71 E12/L (ref 3.5–5.5)
SARS-COV-2 RDRP RESP QL NAA+PROBE: NOT DETECTED
SODIUM SERPL-SCNC: 141 MMOL/L (ref 132–146)
SODIUM SERPL-SCNC: 146 MMOL/L (ref 132–146)
TARGET CELLS: ABNORMAL
TEAR DROP CELLS: ABNORMAL
TROPONIN, HIGH SENSITIVITY: 10 NG/L (ref 0–9)
TROPONIN, HIGH SENSITIVITY: 18 NG/L (ref 0–9)
WBC # BLD: 7.8 E9/L (ref 4.5–11.5)

## 2023-03-18 PROCEDURE — 83690 ASSAY OF LIPASE: CPT

## 2023-03-18 PROCEDURE — 84484 ASSAY OF TROPONIN QUANT: CPT

## 2023-03-18 PROCEDURE — 36415 COLL VENOUS BLD VENIPUNCTURE: CPT

## 2023-03-18 PROCEDURE — 36556 INSERT NON-TUNNEL CV CATH: CPT

## 2023-03-18 PROCEDURE — 71045 X-RAY EXAM CHEST 1 VIEW: CPT

## 2023-03-18 PROCEDURE — 02HV33Z INSERTION OF INFUSION DEVICE INTO SUPERIOR VENA CAVA, PERCUTANEOUS APPROACH: ICD-10-PCS

## 2023-03-18 PROCEDURE — 70450 CT HEAD/BRAIN W/O DYE: CPT

## 2023-03-18 PROCEDURE — 85025 COMPLETE CBC W/AUTO DIFF WBC: CPT

## 2023-03-18 PROCEDURE — 96360 HYDRATION IV INFUSION INIT: CPT

## 2023-03-18 PROCEDURE — 99285 EMERGENCY DEPT VISIT HI MDM: CPT

## 2023-03-18 PROCEDURE — 93010 ELECTROCARDIOGRAM REPORT: CPT | Performed by: INTERNAL MEDICINE

## 2023-03-18 PROCEDURE — 1200000000 HC SEMI PRIVATE

## 2023-03-18 PROCEDURE — 83605 ASSAY OF LACTIC ACID: CPT

## 2023-03-18 PROCEDURE — 83735 ASSAY OF MAGNESIUM: CPT

## 2023-03-18 PROCEDURE — 87040 BLOOD CULTURE FOR BACTERIA: CPT

## 2023-03-18 PROCEDURE — 87635 SARS-COV-2 COVID-19 AMP PRB: CPT

## 2023-03-18 PROCEDURE — 80076 HEPATIC FUNCTION PANEL: CPT

## 2023-03-18 PROCEDURE — 2580000003 HC RX 258: Performed by: STUDENT IN AN ORGANIZED HEALTH CARE EDUCATION/TRAINING PROGRAM

## 2023-03-18 PROCEDURE — 80048 BASIC METABOLIC PNL TOTAL CA: CPT

## 2023-03-18 PROCEDURE — 96361 HYDRATE IV INFUSION ADD-ON: CPT

## 2023-03-18 PROCEDURE — 93005 ELECTROCARDIOGRAM TRACING: CPT | Performed by: PHYSICIAN ASSISTANT

## 2023-03-18 RX ORDER — ONDANSETRON 8 MG/1
8 TABLET, ORALLY DISINTEGRATING ORAL EVERY 8 HOURS PRN
COMMUNITY

## 2023-03-18 RX ORDER — 0.9 % SODIUM CHLORIDE 0.9 %
1000 INTRAVENOUS SOLUTION INTRAVENOUS ONCE
Status: COMPLETED | OUTPATIENT
Start: 2023-03-18 | End: 2023-03-18

## 2023-03-18 RX ORDER — OLANZAPINE 5 MG/1
5 TABLET ORAL NIGHTLY
COMMUNITY

## 2023-03-18 RX ADMIN — SODIUM CHLORIDE 1000 ML: 9 INJECTION, SOLUTION INTRAVENOUS at 15:00

## 2023-03-18 RX ADMIN — SODIUM CHLORIDE 1000 ML: 9 INJECTION, SOLUTION INTRAVENOUS at 13:22

## 2023-03-18 ASSESSMENT — LIFESTYLE VARIABLES
HOW MANY STANDARD DRINKS CONTAINING ALCOHOL DO YOU HAVE ON A TYPICAL DAY: PATIENT DOES NOT DRINK
HOW OFTEN DO YOU HAVE A DRINK CONTAINING ALCOHOL: NEVER

## 2023-03-18 ASSESSMENT — PAIN - FUNCTIONAL ASSESSMENT: PAIN_FUNCTIONAL_ASSESSMENT: NONE - DENIES PAIN

## 2023-03-18 NOTE — ED NOTES
Patient was over in CT for scan, IV infiltrated, CT attempted to place IV. This nurse went to attempt IV insertion and patient refusing IV at this time.  Informed ED physician     Jorge Herron RN  03/18/23 2888

## 2023-03-18 NOTE — ED PROVIDER NOTES
administration in time range)   0.9 % sodium chloride infusion (has no administration in time range)   ondansetron (ZOFRAN-ODT) disintegrating tablet 4 mg (4 mg Oral Given 3/20/23 0425)     Or   ondansetron (ZOFRAN) injection 4 mg ( IntraVENous See Alternative 3/20/23 0425)   acetaminophen (TYLENOL) tablet 650 mg (650 mg Oral Given 3/20/23 0430)     Or   acetaminophen (TYLENOL) suppository 650 mg ( Rectal See Alternative 3/20/23 0430)   heparin flush 100 UNIT/ML injection 300 Units (300 Units IntraCATHeter Not Given 3/19/23 2104)   enoxaparin (LOVENOX) injection 60 mg (60 mg SubCUTAneous Given 3/19/23 2117)   0.9 % sodium chloride bolus (0 mLs IntraVENous Stopped 3/18/23 1424)   0.9 % sodium chloride bolus (0 mLs IntraVENous Stopped 3/18/23 1707)   iopamidol (ISOVUE-370) 76 % injection 75 mL (75 mLs IntraVENous Given 3/19/23 0117)         Is this patient to be included in the SEP-1 Core Measure due to severe sepsis or septic shock? No Exclusion criteria - the patient is NOT to be included for SEP-1 Core Measure due to: Infection is not suspected      Medical Decision Making/Differential Diagnosis:    CC/HPI Summary, Social Determinants of health, Records Reviewed, DDx, testing done/not done, ED Course, Reassessment, disposition considerations/shared decision making with patient, consults, disposition:      ED Course as of 03/20/23 0821   Sat Mar 18, 2023   1211 EKG: This EKG is signed and interpreted by me. Rate: 116  Rhythm: Sinus  Interpretation: Sinus tachycardia, normal axis, no acute ST elevations or depressions, normals are normal, QTc is 433  Comparison: no previous EKG available    [KG]   1648 Signout accepted at this time. [RW]   2132 PROCEDURE  3/18/23       Time: 2100    CENTRAL LINE INSERTION  Risks, benefits and alternatives (for applicable procedures below) described. Performed By: Jeffrey Mendieta DO.     Indication: vascular access, poor peripheral access, and inability to gain peripheral

## 2023-03-18 NOTE — ED NOTES
Attempted multiple times to get patient to provide urine sample. Patient placed on bed pan with no success. Explained to patient that we will have to catheterize her which she adamantly refused.  Will attempt at a later time      Michelle Cornell RN  03/18/23 1720

## 2023-03-19 ENCOUNTER — APPOINTMENT (OUTPATIENT)
Dept: CT IMAGING | Age: 63
DRG: 469 | End: 2023-03-19
Payer: COMMERCIAL

## 2023-03-19 LAB
ALBUMIN SERPL-MCNC: 2.4 G/DL (ref 3.5–5.2)
ALP SERPL-CCNC: 560 U/L (ref 35–104)
ALT SERPL-CCNC: 23 U/L (ref 0–32)
ANION GAP SERPL CALCULATED.3IONS-SCNC: 15 MMOL/L (ref 7–16)
AST SERPL-CCNC: 54 U/L (ref 0–31)
BILIRUB SERPL-MCNC: 0.5 MG/DL (ref 0–1.2)
BUN SERPL-MCNC: 25 MG/DL (ref 6–23)
CALCIUM SERPL-MCNC: 8.4 MG/DL (ref 8.6–10.2)
CHLORIDE SERPL-SCNC: 118 MMOL/L (ref 98–107)
CO2 SERPL-SCNC: 10 MMOL/L (ref 22–29)
CREAT SERPL-MCNC: 1.7 MG/DL (ref 0.5–1)
CREAT UR-MCNC: 135 MG/DL (ref 29–226)
FERRITIN SERPL-MCNC: 959 NG/ML
FOLATE SERPL-MCNC: 2.7 NG/ML (ref 4.8–24.2)
GLUCOSE SERPL-MCNC: 96 MG/DL (ref 74–99)
IRON SATN MFR SERPL: 101 % (ref 15–50)
IRON SERPL-MCNC: 104 MCG/DL (ref 37–145)
LACTATE BLDV-SCNC: 2 MMOL/L (ref 0.5–2.2)
MAGNESIUM SERPL-MCNC: 1.8 MG/DL (ref 1.6–2.6)
PHOSPHATE SERPL-MCNC: 4.8 MG/DL (ref 2.5–4.5)
POTASSIUM SERPL-SCNC: 3.2 MMOL/L (ref 3.5–5)
POTASSIUM SERPL-SCNC: 3.2 MMOL/L (ref 3.5–5)
PROT SERPL-MCNC: 6 G/DL (ref 6.4–8.3)
REASON FOR REJECTION: NORMAL
REJECTED TEST: NORMAL
SODIUM SERPL-SCNC: 143 MMOL/L (ref 132–146)
SODIUM UR-SCNC: <20 MMOL/L
TIBC SERPL-MCNC: 103 MCG/DL (ref 250–450)
UREA NITROGEN, UR: 453 MG/DL (ref 800–1666)
VIT B12 SERPL-MCNC: 364 PG/ML (ref 211–946)

## 2023-03-19 PROCEDURE — 2580000003 HC RX 258

## 2023-03-19 PROCEDURE — 74177 CT ABD & PELVIS W/CONTRAST: CPT

## 2023-03-19 PROCEDURE — 83735 ASSAY OF MAGNESIUM: CPT

## 2023-03-19 PROCEDURE — 82746 ASSAY OF FOLIC ACID SERUM: CPT

## 2023-03-19 PROCEDURE — 83550 IRON BINDING TEST: CPT

## 2023-03-19 PROCEDURE — 83605 ASSAY OF LACTIC ACID: CPT

## 2023-03-19 PROCEDURE — 82570 ASSAY OF URINE CREATININE: CPT

## 2023-03-19 PROCEDURE — 6360000004 HC RX CONTRAST MEDICATION: Performed by: RADIOLOGY

## 2023-03-19 PROCEDURE — 6360000002 HC RX W HCPCS: Performed by: NURSE PRACTITIONER

## 2023-03-19 PROCEDURE — 80053 COMPREHEN METABOLIC PANEL: CPT

## 2023-03-19 PROCEDURE — 1200000000 HC SEMI PRIVATE

## 2023-03-19 PROCEDURE — 82728 ASSAY OF FERRITIN: CPT

## 2023-03-19 PROCEDURE — 71275 CT ANGIOGRAPHY CHEST: CPT

## 2023-03-19 PROCEDURE — 84300 ASSAY OF URINE SODIUM: CPT

## 2023-03-19 PROCEDURE — 6370000000 HC RX 637 (ALT 250 FOR IP)

## 2023-03-19 PROCEDURE — 36415 COLL VENOUS BLD VENIPUNCTURE: CPT

## 2023-03-19 PROCEDURE — 84540 ASSAY OF URINE/UREA-N: CPT

## 2023-03-19 PROCEDURE — 80069 RENAL FUNCTION PANEL: CPT

## 2023-03-19 PROCEDURE — 82607 VITAMIN B-12: CPT

## 2023-03-19 PROCEDURE — 83540 ASSAY OF IRON: CPT

## 2023-03-19 RX ORDER — ONDANSETRON 2 MG/ML
4 INJECTION INTRAMUSCULAR; INTRAVENOUS EVERY 6 HOURS PRN
Status: DISCONTINUED | OUTPATIENT
Start: 2023-03-19 | End: 2023-03-26 | Stop reason: HOSPADM

## 2023-03-19 RX ORDER — HEPARIN SODIUM (PORCINE) LOCK FLUSH IV SOLN 100 UNIT/ML 100 UNIT/ML
300 SOLUTION INTRAVENOUS 2 TIMES DAILY
Status: DISCONTINUED | OUTPATIENT
Start: 2023-03-19 | End: 2023-03-26 | Stop reason: HOSPADM

## 2023-03-19 RX ORDER — ACETAMINOPHEN 650 MG/1
650 SUPPOSITORY RECTAL EVERY 6 HOURS PRN
Status: DISCONTINUED | OUTPATIENT
Start: 2023-03-19 | End: 2023-03-26 | Stop reason: HOSPADM

## 2023-03-19 RX ORDER — ONDANSETRON 4 MG/1
4 TABLET, ORALLY DISINTEGRATING ORAL EVERY 8 HOURS PRN
Status: DISCONTINUED | OUTPATIENT
Start: 2023-03-19 | End: 2023-03-26 | Stop reason: HOSPADM

## 2023-03-19 RX ORDER — ENOXAPARIN SODIUM 100 MG/ML
1 INJECTION SUBCUTANEOUS 2 TIMES DAILY
Status: DISCONTINUED | OUTPATIENT
Start: 2023-03-19 | End: 2023-03-26 | Stop reason: HOSPADM

## 2023-03-19 RX ORDER — POTASSIUM CHLORIDE 20 MEQ/1
20 TABLET, EXTENDED RELEASE ORAL DAILY
Status: DISCONTINUED | OUTPATIENT
Start: 2023-03-19 | End: 2023-03-21

## 2023-03-19 RX ORDER — CALCIUM CARBONATE 200(500)MG
2 TABLET,CHEWABLE ORAL 2 TIMES DAILY
Status: DISCONTINUED | OUTPATIENT
Start: 2023-03-19 | End: 2023-03-26 | Stop reason: HOSPADM

## 2023-03-19 RX ORDER — HEPARIN SODIUM 10000 [USP'U]/ML
5000 INJECTION, SOLUTION INTRAVENOUS; SUBCUTANEOUS EVERY 8 HOURS
Status: DISCONTINUED | OUTPATIENT
Start: 2023-03-19 | End: 2023-03-19

## 2023-03-19 RX ORDER — PANTOPRAZOLE SODIUM 40 MG/1
40 TABLET, DELAYED RELEASE ORAL 2 TIMES DAILY
Status: DISCONTINUED | OUTPATIENT
Start: 2023-03-19 | End: 2023-03-26 | Stop reason: HOSPADM

## 2023-03-19 RX ORDER — SODIUM CHLORIDE 0.9 % (FLUSH) 0.9 %
5-40 SYRINGE (ML) INJECTION PRN
Status: DISCONTINUED | OUTPATIENT
Start: 2023-03-19 | End: 2023-03-26 | Stop reason: HOSPADM

## 2023-03-19 RX ORDER — VITAMIN B COMPLEX
1000 TABLET ORAL DAILY
Status: DISCONTINUED | OUTPATIENT
Start: 2023-03-19 | End: 2023-03-26 | Stop reason: HOSPADM

## 2023-03-19 RX ORDER — ENOXAPARIN SODIUM 100 MG/ML
40 INJECTION SUBCUTANEOUS DAILY
Status: DISCONTINUED | OUTPATIENT
Start: 2023-03-19 | End: 2023-03-19

## 2023-03-19 RX ORDER — SODIUM CHLORIDE 9 MG/ML
INJECTION, SOLUTION INTRAVENOUS PRN
Status: DISCONTINUED | OUTPATIENT
Start: 2023-03-19 | End: 2023-03-20 | Stop reason: SDUPTHER

## 2023-03-19 RX ORDER — OLANZAPINE 5 MG/1
5 TABLET ORAL NIGHTLY
Status: DISCONTINUED | OUTPATIENT
Start: 2023-03-19 | End: 2023-03-26 | Stop reason: HOSPADM

## 2023-03-19 RX ORDER — ACETAMINOPHEN 325 MG/1
650 TABLET ORAL EVERY 6 HOURS PRN
Status: DISCONTINUED | OUTPATIENT
Start: 2023-03-19 | End: 2023-03-26 | Stop reason: HOSPADM

## 2023-03-19 RX ORDER — SODIUM CHLORIDE 9 MG/ML
INJECTION, SOLUTION INTRAVENOUS CONTINUOUS
Status: DISCONTINUED | OUTPATIENT
Start: 2023-03-19 | End: 2023-03-21

## 2023-03-19 RX ORDER — DIPHENOXYLATE HYDROCHLORIDE AND ATROPINE SULFATE 2.5; .025 MG/1; MG/1
2 TABLET ORAL 4 TIMES DAILY PRN
Status: DISCONTINUED | OUTPATIENT
Start: 2023-03-19 | End: 2023-03-26 | Stop reason: HOSPADM

## 2023-03-19 RX ORDER — SUCRALFATE 1 G/1
1 TABLET ORAL 4 TIMES DAILY
Status: DISCONTINUED | OUTPATIENT
Start: 2023-03-19 | End: 2023-03-26 | Stop reason: HOSPADM

## 2023-03-19 RX ORDER — SODIUM CHLORIDE 0.9 % (FLUSH) 0.9 %
5-40 SYRINGE (ML) INJECTION EVERY 12 HOURS SCHEDULED
Status: DISCONTINUED | OUTPATIENT
Start: 2023-03-19 | End: 2023-03-26 | Stop reason: HOSPADM

## 2023-03-19 RX ORDER — MEGESTROL ACETATE 40 MG/ML
200 SUSPENSION ORAL DAILY
Status: DISCONTINUED | OUTPATIENT
Start: 2023-03-19 | End: 2023-03-19

## 2023-03-19 RX ADMIN — IOPAMIDOL 75 ML: 755 INJECTION, SOLUTION INTRAVENOUS at 01:17

## 2023-03-19 RX ADMIN — PANTOPRAZOLE SODIUM 40 MG: 40 TABLET, DELAYED RELEASE ORAL at 21:14

## 2023-03-19 RX ADMIN — PANTOPRAZOLE SODIUM 40 MG: 40 TABLET, DELAYED RELEASE ORAL at 10:51

## 2023-03-19 RX ADMIN — ENOXAPARIN SODIUM 60 MG: 100 INJECTION SUBCUTANEOUS at 21:17

## 2023-03-19 RX ADMIN — SODIUM CHLORIDE: 9 INJECTION, SOLUTION INTRAVENOUS at 05:49

## 2023-03-19 RX ADMIN — ENOXAPARIN SODIUM 60 MG: 100 INJECTION SUBCUTANEOUS at 10:51

## 2023-03-19 RX ADMIN — OLANZAPINE 5 MG: 5 TABLET, FILM COATED ORAL at 21:14

## 2023-03-19 RX ADMIN — Medication 1000 UNITS: at 10:51

## 2023-03-19 RX ADMIN — POTASSIUM CHLORIDE 20 MEQ: 1500 TABLET, EXTENDED RELEASE ORAL at 10:51

## 2023-03-19 RX ADMIN — SUCRALFATE 1 G: 1 TABLET ORAL at 10:51

## 2023-03-19 RX ADMIN — CALCIUM CARBONATE 1000 MG: 500 TABLET, CHEWABLE ORAL at 10:51

## 2023-03-19 RX ADMIN — CALCIUM CARBONATE 1000 MG: 500 TABLET, CHEWABLE ORAL at 21:14

## 2023-03-19 NOTE — ED NOTES
Received call from CT unable to flush line. Will send patient back to ED. Updated ED resident of above.       Guerda Garza, RN  03/18/23 2016

## 2023-03-19 NOTE — ED NOTES
Was called to waiting room by sonny-pt was sitting in waiting room states,\"I'm tired of waiting for something to drink so I got it myself. \" Pt drinking a coke. Explained to patient we are waiting of test results before we can give her something to eat or drink. (CT results pending.)   Pt updated on admission to the hospital and room assignment once results obtained.       Rhnoda Knight RN  03/18/23 0000

## 2023-03-19 NOTE — ED NOTES
Received call from [facility] waiver for CT. Dr Jessica Neri notified.       Marin Clayton RN  03/19/23 8942

## 2023-03-19 NOTE — ED NOTES
Verified with Dr Hernan pichardo to send patient to the floor.       Jose Francisco Bee RN  03/19/23 5263

## 2023-03-19 NOTE — ED NOTES
Radiology Procedure Waiver   Name: Lakia Marlow  : 1960  MRN: [id number]    Date:  3/19/23    Time: 12:32 AM EDT    Benefits of immediately proceeding with Radiology exam(s) without pre-testing outweigh the risks or are not indicated as specified below and therefore the following is/are being waived:    [] Pregnancy test   [] Patients LMP on-time and regular.   [] Patient had Tubal Ligation or has other Contraception Device. [] Patient  is Menopausal or Premenarcheal.    [] Patient had Full or Partial Hysterectomy. [] Protocol for Iodine allergy    [] MRI Questionnaire     [x] BUN/Creatinine   [] Patient age w/no hx of renal dysfunction. [] Patient on Dialysis. [] Recent Normal Labs.   Electronically signed by Braeden Yang DO on 3/19/23 at 12:32 AM EDT               Braeden Yang DO  Resident  [phone]

## 2023-03-20 PROBLEM — E43 SEVERE PROTEIN-CALORIE MALNUTRITION (HCC): Chronic | Status: ACTIVE | Noted: 2023-03-20

## 2023-03-20 PROBLEM — I74.5: Status: ACTIVE | Noted: 2023-03-20

## 2023-03-20 LAB
ANION GAP SERPL CALCULATED.3IONS-SCNC: 18 MMOL/L (ref 7–16)
BUN SERPL-MCNC: 25 MG/DL (ref 6–23)
CALCIUM SERPL-MCNC: 8.5 MG/DL (ref 8.6–10.2)
CHLORIDE SERPL-SCNC: 120 MMOL/L (ref 98–107)
CO2 SERPL-SCNC: 7 MMOL/L (ref 22–29)
CREAT SERPL-MCNC: 1.7 MG/DL (ref 0.5–1)
ERYTHROCYTE [DISTWIDTH] IN BLOOD BY AUTOMATED COUNT: 19.8 FL (ref 11.5–15)
GLUCOSE SERPL-MCNC: 133 MG/DL (ref 74–99)
HCT VFR BLD AUTO: 24.5 % (ref 34–48)
HGB BLD-MCNC: 7.7 G/DL (ref 11.5–15.5)
MCH RBC QN AUTO: 25.1 PG (ref 26–35)
MCHC RBC AUTO-ENTMCNC: 31.4 % (ref 32–34.5)
MCV RBC AUTO: 79.8 FL (ref 80–99.9)
METER GLUCOSE: 86 MG/DL (ref 74–99)
PLATELET # BLD AUTO: 315 E9/L (ref 130–450)
PMV BLD AUTO: 9.5 FL (ref 7–12)
POTASSIUM SERPL-SCNC: 3.1 MMOL/L (ref 3.5–5)
RBC # BLD AUTO: 3.07 E12/L (ref 3.5–5.5)
SODIUM SERPL-SCNC: 145 MMOL/L (ref 132–146)
WBC # BLD: 7 E9/L (ref 4.5–11.5)

## 2023-03-20 PROCEDURE — 82962 GLUCOSE BLOOD TEST: CPT

## 2023-03-20 PROCEDURE — C1751 CATH, INF, PER/CENT/MIDLINE: HCPCS

## 2023-03-20 PROCEDURE — 97535 SELF CARE MNGMENT TRAINING: CPT

## 2023-03-20 PROCEDURE — 6370000000 HC RX 637 (ALT 250 FOR IP): Performed by: PHYSICIAN ASSISTANT

## 2023-03-20 PROCEDURE — 99223 1ST HOSP IP/OBS HIGH 75: CPT | Performed by: SURGERY

## 2023-03-20 PROCEDURE — 85027 COMPLETE CBC AUTOMATED: CPT

## 2023-03-20 PROCEDURE — 80048 BASIC METABOLIC PNL TOTAL CA: CPT

## 2023-03-20 PROCEDURE — 97165 OT EVAL LOW COMPLEX 30 MIN: CPT

## 2023-03-20 PROCEDURE — 1200000000 HC SEMI PRIVATE

## 2023-03-20 PROCEDURE — 6370000000 HC RX 637 (ALT 250 FOR IP)

## 2023-03-20 PROCEDURE — 2500000003 HC RX 250 WO HCPCS: Performed by: INTERNAL MEDICINE

## 2023-03-20 PROCEDURE — 2580000003 HC RX 258

## 2023-03-20 PROCEDURE — 36415 COLL VENOUS BLD VENIPUNCTURE: CPT

## 2023-03-20 PROCEDURE — 36410 VNPNXR 3YR/> PHY/QHP DX/THER: CPT

## 2023-03-20 PROCEDURE — 2580000003 HC RX 258: Performed by: INTERNAL MEDICINE

## 2023-03-20 PROCEDURE — 6370000000 HC RX 637 (ALT 250 FOR IP): Performed by: NURSE PRACTITIONER

## 2023-03-20 PROCEDURE — 76937 US GUIDE VASCULAR ACCESS: CPT

## 2023-03-20 PROCEDURE — 6360000002 HC RX W HCPCS: Performed by: NURSE PRACTITIONER

## 2023-03-20 PROCEDURE — 6360000002 HC RX W HCPCS

## 2023-03-20 RX ORDER — TRAMADOL HYDROCHLORIDE 50 MG/1
25 TABLET ORAL ONCE
Status: COMPLETED | OUTPATIENT
Start: 2023-03-20 | End: 2023-03-20

## 2023-03-20 RX ORDER — SODIUM CHLORIDE 0.9 % (FLUSH) 0.9 %
5-40 SYRINGE (ML) INJECTION PRN
Status: DISCONTINUED | OUTPATIENT
Start: 2023-03-20 | End: 2023-03-26 | Stop reason: HOSPADM

## 2023-03-20 RX ORDER — FOLIC ACID 1 MG/1
1 TABLET ORAL DAILY
Status: DISCONTINUED | OUTPATIENT
Start: 2023-03-20 | End: 2023-03-26 | Stop reason: HOSPADM

## 2023-03-20 RX ORDER — HEPARIN SODIUM (PORCINE) LOCK FLUSH IV SOLN 100 UNIT/ML 100 UNIT/ML
1 SOLUTION INTRAVENOUS PRN
Status: DISCONTINUED | OUTPATIENT
Start: 2023-03-20 | End: 2023-03-26 | Stop reason: HOSPADM

## 2023-03-20 RX ORDER — HEPARIN SODIUM (PORCINE) LOCK FLUSH IV SOLN 100 UNIT/ML 100 UNIT/ML
1 SOLUTION INTRAVENOUS EVERY 12 HOURS SCHEDULED
Status: DISCONTINUED | OUTPATIENT
Start: 2023-03-20 | End: 2023-03-26 | Stop reason: HOSPADM

## 2023-03-20 RX ORDER — SODIUM CHLORIDE 0.9 % (FLUSH) 0.9 %
5-40 SYRINGE (ML) INJECTION EVERY 12 HOURS SCHEDULED
Status: DISCONTINUED | OUTPATIENT
Start: 2023-03-20 | End: 2023-03-26 | Stop reason: HOSPADM

## 2023-03-20 RX ORDER — SODIUM CHLORIDE 9 MG/ML
INJECTION, SOLUTION INTRAVENOUS PRN
Status: DISCONTINUED | OUTPATIENT
Start: 2023-03-20 | End: 2023-03-26 | Stop reason: HOSPADM

## 2023-03-20 RX ADMIN — FOLIC ACID 1 MG: 1 TABLET ORAL at 18:22

## 2023-03-20 RX ADMIN — SUCRALFATE 1 G: 1 TABLET ORAL at 18:22

## 2023-03-20 RX ADMIN — SODIUM BICARBONATE: 84 INJECTION, SOLUTION INTRAVENOUS at 18:31

## 2023-03-20 RX ADMIN — POTASSIUM CHLORIDE 20 MEQ: 1500 TABLET, EXTENDED RELEASE ORAL at 10:11

## 2023-03-20 RX ADMIN — CALCIUM CARBONATE 1000 MG: 500 TABLET, CHEWABLE ORAL at 10:11

## 2023-03-20 RX ADMIN — SUCRALFATE 1 G: 1 TABLET ORAL at 20:15

## 2023-03-20 RX ADMIN — SODIUM CHLORIDE, PRESERVATIVE FREE 10 ML: 5 INJECTION INTRAVENOUS at 20:18

## 2023-03-20 RX ADMIN — TRAMADOL HYDROCHLORIDE 25 MG: 50 TABLET ORAL at 21:30

## 2023-03-20 RX ADMIN — SUCRALFATE 1 G: 1 TABLET ORAL at 13:48

## 2023-03-20 RX ADMIN — SUCRALFATE 1 G: 1 TABLET ORAL at 10:11

## 2023-03-20 RX ADMIN — SODIUM CHLORIDE, PRESERVATIVE FREE 10 ML: 5 INJECTION INTRAVENOUS at 10:13

## 2023-03-20 RX ADMIN — ONDANSETRON 4 MG: 4 TABLET, ORALLY DISINTEGRATING ORAL at 13:48

## 2023-03-20 RX ADMIN — CALCIUM CARBONATE 1000 MG: 500 TABLET, CHEWABLE ORAL at 20:16

## 2023-03-20 RX ADMIN — Medication 1000 UNITS: at 10:11

## 2023-03-20 RX ADMIN — ACETAMINOPHEN 650 MG: 325 TABLET ORAL at 04:30

## 2023-03-20 RX ADMIN — HEPARIN 300 UNITS: 100 SYRINGE at 10:34

## 2023-03-20 RX ADMIN — HEPARIN 100 UNITS: 100 SYRINGE at 20:17

## 2023-03-20 RX ADMIN — OLANZAPINE 5 MG: 5 TABLET, FILM COATED ORAL at 20:16

## 2023-03-20 RX ADMIN — ENOXAPARIN SODIUM 60 MG: 100 INJECTION SUBCUTANEOUS at 20:17

## 2023-03-20 RX ADMIN — PANTOPRAZOLE SODIUM 40 MG: 40 TABLET, DELAYED RELEASE ORAL at 10:11

## 2023-03-20 RX ADMIN — ENOXAPARIN SODIUM 60 MG: 100 INJECTION SUBCUTANEOUS at 10:11

## 2023-03-20 RX ADMIN — SODIUM CHLORIDE: 9 INJECTION, SOLUTION INTRAVENOUS at 18:29

## 2023-03-20 RX ADMIN — ACETAMINOPHEN 650 MG: 325 TABLET ORAL at 18:22

## 2023-03-20 RX ADMIN — ONDANSETRON 4 MG: 4 TABLET, ORALLY DISINTEGRATING ORAL at 04:25

## 2023-03-20 RX ADMIN — PANTOPRAZOLE SODIUM 40 MG: 40 TABLET, DELAYED RELEASE ORAL at 20:15

## 2023-03-20 ASSESSMENT — PAIN DESCRIPTION - DESCRIPTORS
DESCRIPTORS: THROBBING
DESCRIPTORS: ACHING

## 2023-03-20 ASSESSMENT — PAIN SCALES - GENERAL
PAINLEVEL_OUTOF10: 7
PAINLEVEL_OUTOF10: 7

## 2023-03-20 ASSESSMENT — PAIN DESCRIPTION - LOCATION
LOCATION: ABDOMEN
LOCATION: ARM

## 2023-03-20 ASSESSMENT — PAIN SCALES - WONG BAKER: WONGBAKER_NUMERICALRESPONSE: 0

## 2023-03-20 ASSESSMENT — PAIN - FUNCTIONAL ASSESSMENT: PAIN_FUNCTIONAL_ASSESSMENT: PREVENTS OR INTERFERES SOME ACTIVE ACTIVITIES AND ADLS

## 2023-03-20 NOTE — ACP (ADVANCE CARE PLANNING)
Advance Care Planning   The patient has the following advanced directives on file:  Advance Directives       Power of  Living Will ACP-Advance Directive ACP-Power of     Not on File Filed on 02/26/13 Filed Not on File            The patient has appointed the following active healthcare agents:    Primary Decision Maker: Dorothy Estevez [facility] - [phone]    Secondary Decision Maker: Ramses Cruz Child - [phone]    The Patient has the following current code status:    Code Status: Full Code      Valentino Oiler, LSW  3/20/2023

## 2023-03-21 ENCOUNTER — APPOINTMENT (OUTPATIENT)
Dept: ULTRASOUND IMAGING | Age: 63
DRG: 469 | End: 2023-03-21
Payer: COMMERCIAL

## 2023-03-21 LAB
ANION GAP SERPL CALCULATED.3IONS-SCNC: 17 MMOL/L (ref 7–16)
ANION GAP SERPL CALCULATED.3IONS-SCNC: 18 MMOL/L (ref 7–16)
BUN SERPL-MCNC: 23 MG/DL (ref 6–23)
BUN SERPL-MCNC: 25 MG/DL (ref 6–23)
CALCIUM SERPL-MCNC: 8.3 MG/DL (ref 8.6–10.2)
CALCIUM SERPL-MCNC: 8.9 MG/DL (ref 8.6–10.2)
CHLORIDE SERPL-SCNC: 115 MMOL/L (ref 98–107)
CHLORIDE SERPL-SCNC: 119 MMOL/L (ref 98–107)
CO2 SERPL-SCNC: 11 MMOL/L (ref 22–29)
CO2 SERPL-SCNC: 7 MMOL/L (ref 22–29)
CREAT SERPL-MCNC: 1.6 MG/DL (ref 0.5–1)
CREAT SERPL-MCNC: 1.7 MG/DL (ref 0.5–1)
GLUCOSE SERPL-MCNC: 144 MG/DL (ref 74–99)
GLUCOSE SERPL-MCNC: 79 MG/DL (ref 74–99)
LACTATE BLDV-SCNC: 5.5 MMOL/L (ref 0.5–2.2)
POTASSIUM SERPL-SCNC: 2.8 MMOL/L (ref 3.5–5)
POTASSIUM SERPL-SCNC: 3 MMOL/L (ref 3.5–5)
SODIUM SERPL-SCNC: 143 MMOL/L (ref 132–146)
SODIUM SERPL-SCNC: 144 MMOL/L (ref 132–146)

## 2023-03-21 PROCEDURE — 2580000003 HC RX 258: Performed by: NURSE PRACTITIONER

## 2023-03-21 PROCEDURE — 6370000000 HC RX 637 (ALT 250 FOR IP): Performed by: FAMILY MEDICINE

## 2023-03-21 PROCEDURE — 2500000003 HC RX 250 WO HCPCS: Performed by: INTERNAL MEDICINE

## 2023-03-21 PROCEDURE — 36415 COLL VENOUS BLD VENIPUNCTURE: CPT

## 2023-03-21 PROCEDURE — 6370000000 HC RX 637 (ALT 250 FOR IP)

## 2023-03-21 PROCEDURE — 6360000002 HC RX W HCPCS

## 2023-03-21 PROCEDURE — 2580000003 HC RX 258: Performed by: INTERNAL MEDICINE

## 2023-03-21 PROCEDURE — 76770 US EXAM ABDO BACK WALL COMP: CPT

## 2023-03-21 PROCEDURE — 6360000002 HC RX W HCPCS: Performed by: NURSE PRACTITIONER

## 2023-03-21 PROCEDURE — 6360000002 HC RX W HCPCS: Performed by: INTERNAL MEDICINE

## 2023-03-21 PROCEDURE — 2500000003 HC RX 250 WO HCPCS

## 2023-03-21 PROCEDURE — 6370000000 HC RX 637 (ALT 250 FOR IP): Performed by: INTERNAL MEDICINE

## 2023-03-21 PROCEDURE — 1200000000 HC SEMI PRIVATE

## 2023-03-21 PROCEDURE — 80048 BASIC METABOLIC PNL TOTAL CA: CPT

## 2023-03-21 PROCEDURE — 83605 ASSAY OF LACTIC ACID: CPT

## 2023-03-21 PROCEDURE — 2580000003 HC RX 258

## 2023-03-21 PROCEDURE — 6370000000 HC RX 637 (ALT 250 FOR IP): Performed by: PHYSICIAN ASSISTANT

## 2023-03-21 RX ORDER — TRAMADOL HYDROCHLORIDE 50 MG/1
50 TABLET ORAL EVERY 6 HOURS PRN
Status: DISCONTINUED | OUTPATIENT
Start: 2023-03-21 | End: 2023-03-26 | Stop reason: HOSPADM

## 2023-03-21 RX ORDER — POTASSIUM CHLORIDE 20 MEQ/1
40 TABLET, EXTENDED RELEASE ORAL 2 TIMES DAILY WITH MEALS
Status: DISCONTINUED | OUTPATIENT
Start: 2023-03-22 | End: 2023-03-22

## 2023-03-21 RX ORDER — POTASSIUM CHLORIDE 750 MG/1
40 TABLET, EXTENDED RELEASE ORAL 2 TIMES DAILY
Status: DISCONTINUED | OUTPATIENT
Start: 2023-03-21 | End: 2023-03-22

## 2023-03-21 RX ADMIN — POTASSIUM CHLORIDE 40 MEQ: 1500 TABLET, EXTENDED RELEASE ORAL at 20:35

## 2023-03-21 RX ADMIN — SUCRALFATE 1 G: 1 TABLET ORAL at 16:45

## 2023-03-21 RX ADMIN — OLANZAPINE 5 MG: 5 TABLET, FILM COATED ORAL at 20:35

## 2023-03-21 RX ADMIN — SODIUM BICARBONATE 50 MEQ: 84 INJECTION, SOLUTION INTRAVENOUS at 13:21

## 2023-03-21 RX ADMIN — POTASSIUM CHLORIDE: 2 INJECTION, SOLUTION, CONCENTRATE INTRAVENOUS at 14:04

## 2023-03-21 RX ADMIN — Medication 1000 UNITS: at 08:22

## 2023-03-21 RX ADMIN — PANTOPRAZOLE SODIUM 40 MG: 40 TABLET, DELAYED RELEASE ORAL at 08:16

## 2023-03-21 RX ADMIN — SODIUM CHLORIDE, PRESERVATIVE FREE 10 ML: 5 INJECTION INTRAVENOUS at 08:19

## 2023-03-21 RX ADMIN — ENOXAPARIN SODIUM 60 MG: 100 INJECTION SUBCUTANEOUS at 08:17

## 2023-03-21 RX ADMIN — PANTOPRAZOLE SODIUM 40 MG: 40 TABLET, DELAYED RELEASE ORAL at 20:33

## 2023-03-21 RX ADMIN — HEPARIN 100 UNITS: 100 SYRINGE at 08:18

## 2023-03-21 RX ADMIN — CALCIUM CARBONATE 1000 MG: 500 TABLET, CHEWABLE ORAL at 20:33

## 2023-03-21 RX ADMIN — Medication 10 ML: at 20:39

## 2023-03-21 RX ADMIN — POTASSIUM CHLORIDE: 2 INJECTION, SOLUTION, CONCENTRATE INTRAVENOUS at 22:32

## 2023-03-21 RX ADMIN — SODIUM BICARBONATE 50 MEQ: 84 INJECTION, SOLUTION INTRAVENOUS at 12:52

## 2023-03-21 RX ADMIN — CALCIUM CARBONATE 1000 MG: 500 TABLET, CHEWABLE ORAL at 08:17

## 2023-03-21 RX ADMIN — FOLIC ACID 1 MG: 1 TABLET ORAL at 08:16

## 2023-03-21 RX ADMIN — HEPARIN 300 UNITS: 100 SYRINGE at 22:21

## 2023-03-21 RX ADMIN — Medication 10 ML: at 08:19

## 2023-03-21 RX ADMIN — SUCRALFATE 1 G: 1 TABLET ORAL at 12:22

## 2023-03-21 RX ADMIN — TRAMADOL HYDROCHLORIDE 50 MG: 50 TABLET ORAL at 09:19

## 2023-03-21 RX ADMIN — SUCRALFATE 1 G: 1 TABLET ORAL at 08:16

## 2023-03-21 RX ADMIN — HEPARIN 300 UNITS: 100 SYRINGE at 08:17

## 2023-03-21 RX ADMIN — SUCRALFATE 1 G: 1 TABLET ORAL at 20:36

## 2023-03-21 RX ADMIN — SODIUM CHLORIDE, PRESERVATIVE FREE 10 ML: 5 INJECTION INTRAVENOUS at 22:46

## 2023-03-21 RX ADMIN — ENOXAPARIN SODIUM 60 MG: 100 INJECTION SUBCUTANEOUS at 20:36

## 2023-03-21 RX ADMIN — TRAMADOL HYDROCHLORIDE 50 MG: 50 TABLET ORAL at 20:33

## 2023-03-21 RX ADMIN — POTASSIUM CHLORIDE 40 MEQ: 1500 TABLET, EXTENDED RELEASE ORAL at 08:16

## 2023-03-21 ASSESSMENT — PAIN DESCRIPTION - LOCATION: LOCATION: LEG;ARM;ABDOMEN

## 2023-03-21 ASSESSMENT — PAIN - FUNCTIONAL ASSESSMENT: PAIN_FUNCTIONAL_ASSESSMENT: PREVENTS OR INTERFERES SOME ACTIVE ACTIVITIES AND ADLS

## 2023-03-21 ASSESSMENT — PAIN SCALES - GENERAL
PAINLEVEL_OUTOF10: 7
PAINLEVEL_OUTOF10: 5

## 2023-03-21 ASSESSMENT — PAIN DESCRIPTION - DESCRIPTORS: DESCRIPTORS: SORE

## 2023-03-21 ASSESSMENT — PAIN DESCRIPTION - ORIENTATION: ORIENTATION: LEFT

## 2023-03-22 LAB
ALBUMIN SERPL-MCNC: 2 G/DL (ref 3.5–5.2)
ALP SERPL-CCNC: 588 U/L (ref 35–104)
ALT SERPL-CCNC: 23 U/L (ref 0–32)
AMMONIA PLAS-SCNC: 30 UMOL/L (ref 11–51)
ANION GAP SERPL CALCULATED.3IONS-SCNC: 16 MMOL/L (ref 7–16)
ANION GAP SERPL CALCULATED.3IONS-SCNC: 17 MMOL/L (ref 7–16)
AST SERPL-CCNC: 73 U/L (ref 0–31)
BILIRUB DIRECT SERPL-MCNC: <0.2 MG/DL (ref 0–0.3)
BILIRUB INDIRECT SERPL-MCNC: ABNORMAL MG/DL (ref 0–1)
BILIRUB SERPL-MCNC: 0.3 MG/DL (ref 0–1.2)
BUN SERPL-MCNC: 23 MG/DL (ref 6–23)
BUN SERPL-MCNC: 24 MG/DL (ref 6–23)
CALCIUM SERPL-MCNC: 7.9 MG/DL (ref 8.6–10.2)
CALCIUM SERPL-MCNC: 8.2 MG/DL (ref 8.6–10.2)
CHLORIDE SERPL-SCNC: 108 MMOL/L (ref 98–107)
CHLORIDE SERPL-SCNC: 114 MMOL/L (ref 98–107)
CO2 SERPL-SCNC: 17 MMOL/L (ref 22–29)
CO2 SERPL-SCNC: 19 MMOL/L (ref 22–29)
CREAT SERPL-MCNC: 1.6 MG/DL (ref 0.5–1)
CREAT SERPL-MCNC: 1.6 MG/DL (ref 0.5–1)
ERYTHROCYTE [DISTWIDTH] IN BLOOD BY AUTOMATED COUNT: 18.6 FL (ref 11.5–15)
GLUCOSE SERPL-MCNC: 130 MG/DL (ref 74–99)
GLUCOSE SERPL-MCNC: 95 MG/DL (ref 74–99)
HAPTOGLOB SERPL-MCNC: 173 MG/DL (ref 30–200)
HCT VFR BLD AUTO: 21.7 % (ref 34–48)
HGB BLD-MCNC: 7.3 G/DL (ref 11.5–15.5)
LACTATE BLDV-SCNC: 4.9 MMOL/L (ref 0.5–2.2)
LDH SERPL-CCNC: 253 U/L (ref 135–214)
MCH RBC QN AUTO: 25.1 PG (ref 26–35)
MCHC RBC AUTO-ENTMCNC: 33.6 % (ref 32–34.5)
MCV RBC AUTO: 74.6 FL (ref 80–99.9)
PLATELET # BLD AUTO: 294 E9/L (ref 130–450)
PMV BLD AUTO: 9.5 FL (ref 7–12)
POTASSIUM SERPL-SCNC: 2.9 MMOL/L (ref 3.5–5)
POTASSIUM SERPL-SCNC: 2.9 MMOL/L (ref 3.5–5)
PROT SERPL-MCNC: 5.1 G/DL (ref 6.4–8.3)
RBC # BLD AUTO: 2.91 E12/L (ref 3.5–5.5)
SODIUM SERPL-SCNC: 144 MMOL/L (ref 132–146)
SODIUM SERPL-SCNC: 147 MMOL/L (ref 132–146)
WBC # BLD: 6.8 E9/L (ref 4.5–11.5)

## 2023-03-22 PROCEDURE — 83615 LACTATE (LD) (LDH) ENZYME: CPT

## 2023-03-22 PROCEDURE — 80048 BASIC METABOLIC PNL TOTAL CA: CPT

## 2023-03-22 PROCEDURE — 2580000003 HC RX 258: Performed by: PHYSICIAN ASSISTANT

## 2023-03-22 PROCEDURE — 2580000003 HC RX 258: Performed by: INTERNAL MEDICINE

## 2023-03-22 PROCEDURE — 2500000003 HC RX 250 WO HCPCS: Performed by: INTERNAL MEDICINE

## 2023-03-22 PROCEDURE — 36415 COLL VENOUS BLD VENIPUNCTURE: CPT

## 2023-03-22 PROCEDURE — 6370000000 HC RX 637 (ALT 250 FOR IP): Performed by: PHYSICIAN ASSISTANT

## 2023-03-22 PROCEDURE — 2580000003 HC RX 258: Performed by: NURSE PRACTITIONER

## 2023-03-22 PROCEDURE — 6360000002 HC RX W HCPCS: Performed by: PHYSICIAN ASSISTANT

## 2023-03-22 PROCEDURE — 6360000002 HC RX W HCPCS

## 2023-03-22 PROCEDURE — 6370000000 HC RX 637 (ALT 250 FOR IP): Performed by: FAMILY MEDICINE

## 2023-03-22 PROCEDURE — 6360000002 HC RX W HCPCS: Performed by: INTERNAL MEDICINE

## 2023-03-22 PROCEDURE — 6360000002 HC RX W HCPCS: Performed by: NURSE PRACTITIONER

## 2023-03-22 PROCEDURE — 83010 ASSAY OF HAPTOGLOBIN QUANT: CPT

## 2023-03-22 PROCEDURE — 85027 COMPLETE CBC AUTOMATED: CPT

## 2023-03-22 PROCEDURE — 83605 ASSAY OF LACTIC ACID: CPT

## 2023-03-22 PROCEDURE — 80076 HEPATIC FUNCTION PANEL: CPT

## 2023-03-22 PROCEDURE — 82140 ASSAY OF AMMONIA: CPT

## 2023-03-22 PROCEDURE — 6370000000 HC RX 637 (ALT 250 FOR IP)

## 2023-03-22 PROCEDURE — 6370000000 HC RX 637 (ALT 250 FOR IP): Performed by: INTERNAL MEDICINE

## 2023-03-22 PROCEDURE — 1200000000 HC SEMI PRIVATE

## 2023-03-22 RX ORDER — POTASSIUM CHLORIDE 20 MEQ/1
40 TABLET, EXTENDED RELEASE ORAL 3 TIMES DAILY
Status: DISCONTINUED | OUTPATIENT
Start: 2023-03-22 | End: 2023-03-26 | Stop reason: HOSPADM

## 2023-03-22 RX ADMIN — CALCIUM CARBONATE 1000 MG: 500 TABLET, CHEWABLE ORAL at 20:38

## 2023-03-22 RX ADMIN — IRON SUCROSE 100 MG: 20 INJECTION, SOLUTION INTRAVENOUS at 14:51

## 2023-03-22 RX ADMIN — POTASSIUM CHLORIDE 40 MEQ: 1500 TABLET, EXTENDED RELEASE ORAL at 20:38

## 2023-03-22 RX ADMIN — POTASSIUM CHLORIDE 40 MEQ: 750 TABLET, EXTENDED RELEASE ORAL at 09:49

## 2023-03-22 RX ADMIN — PANTOPRAZOLE SODIUM 40 MG: 40 TABLET, DELAYED RELEASE ORAL at 20:39

## 2023-03-22 RX ADMIN — POTASSIUM CHLORIDE: 2 INJECTION, SOLUTION, CONCENTRATE INTRAVENOUS at 06:05

## 2023-03-22 RX ADMIN — PANTOPRAZOLE SODIUM 40 MG: 40 TABLET, DELAYED RELEASE ORAL at 09:42

## 2023-03-22 RX ADMIN — SUCRALFATE 1 G: 1 TABLET ORAL at 09:42

## 2023-03-22 RX ADMIN — ENOXAPARIN SODIUM 60 MG: 100 INJECTION SUBCUTANEOUS at 09:42

## 2023-03-22 RX ADMIN — SUCRALFATE 1 G: 1 TABLET ORAL at 18:13

## 2023-03-22 RX ADMIN — OLANZAPINE 5 MG: 5 TABLET, FILM COATED ORAL at 20:39

## 2023-03-22 RX ADMIN — TRAMADOL HYDROCHLORIDE 50 MG: 50 TABLET ORAL at 20:39

## 2023-03-22 RX ADMIN — Medication 1000 UNITS: at 09:42

## 2023-03-22 RX ADMIN — Medication 10 ML: at 20:39

## 2023-03-22 RX ADMIN — FOLIC ACID 1 MG: 1 TABLET ORAL at 09:42

## 2023-03-22 RX ADMIN — HEPARIN 300 UNITS: 100 SYRINGE at 20:38

## 2023-03-22 RX ADMIN — CALCIUM CARBONATE 1000 MG: 500 TABLET, CHEWABLE ORAL at 09:42

## 2023-03-22 RX ADMIN — POTASSIUM CHLORIDE: 2 INJECTION, SOLUTION, CONCENTRATE INTRAVENOUS at 13:02

## 2023-03-22 RX ADMIN — TRAMADOL HYDROCHLORIDE 50 MG: 50 TABLET ORAL at 06:49

## 2023-03-22 RX ADMIN — SUCRALFATE 1 G: 1 TABLET ORAL at 20:39

## 2023-03-22 RX ADMIN — POTASSIUM CHLORIDE: 2 INJECTION, SOLUTION, CONCENTRATE INTRAVENOUS at 18:49

## 2023-03-22 ASSESSMENT — PAIN - FUNCTIONAL ASSESSMENT: PAIN_FUNCTIONAL_ASSESSMENT: PREVENTS OR INTERFERES SOME ACTIVE ACTIVITIES AND ADLS

## 2023-03-22 ASSESSMENT — PAIN DESCRIPTION - DESCRIPTORS: DESCRIPTORS: ACHING

## 2023-03-22 ASSESSMENT — PAIN SCALES - GENERAL
PAINLEVEL_OUTOF10: 2
PAINLEVEL_OUTOF10: 5

## 2023-03-22 ASSESSMENT — PAIN SCALES - WONG BAKER: WONGBAKER_NUMERICALRESPONSE: 2

## 2023-03-22 ASSESSMENT — PAIN DESCRIPTION - LOCATION: LOCATION: LEG;ABDOMEN

## 2023-03-23 LAB
ANION GAP SERPL CALCULATED.3IONS-SCNC: 15 MMOL/L (ref 7–16)
BACTERIA BLD CULT: NORMAL
BUN SERPL-MCNC: 24 MG/DL (ref 6–23)
CALCIUM SERPL-MCNC: 7.9 MG/DL (ref 8.6–10.2)
CHLORIDE SERPL-SCNC: 107 MMOL/L (ref 98–107)
CO2 SERPL-SCNC: 22 MMOL/L (ref 22–29)
CREAT SERPL-MCNC: 1.6 MG/DL (ref 0.5–1)
D DIMER: >5250 NG/ML DDU
ERYTHROCYTE [DISTWIDTH] IN BLOOD BY AUTOMATED COUNT: 18.5 FL (ref 11.5–15)
GLUCOSE SERPL-MCNC: 91 MG/DL (ref 74–99)
HCT VFR BLD AUTO: 21 % (ref 34–48)
HGB BLD-MCNC: 7.3 G/DL (ref 11.5–15.5)
LACTATE BLDV-SCNC: 5.2 MMOL/L (ref 0.5–2.2)
LACTATE BLDV-SCNC: 6.6 MMOL/L (ref 0.5–2.2)
MCH RBC QN AUTO: 25.2 PG (ref 26–35)
MCHC RBC AUTO-ENTMCNC: 34.8 % (ref 32–34.5)
MCV RBC AUTO: 72.4 FL (ref 80–99.9)
PLATELET # BLD AUTO: 265 E9/L (ref 130–450)
PMV BLD AUTO: 9.8 FL (ref 7–12)
POTASSIUM SERPL-SCNC: 3.4 MMOL/L (ref 3.5–5)
RBC # BLD AUTO: 2.9 E12/L (ref 3.5–5.5)
SODIUM SERPL-SCNC: 144 MMOL/L (ref 132–146)
WBC # BLD: 8.7 E9/L (ref 4.5–11.5)

## 2023-03-23 PROCEDURE — 6360000002 HC RX W HCPCS: Performed by: NURSE PRACTITIONER

## 2023-03-23 PROCEDURE — 2580000003 HC RX 258: Performed by: NURSE PRACTITIONER

## 2023-03-23 PROCEDURE — 6360000002 HC RX W HCPCS: Performed by: INTERNAL MEDICINE

## 2023-03-23 PROCEDURE — 83605 ASSAY OF LACTIC ACID: CPT

## 2023-03-23 PROCEDURE — 36415 COLL VENOUS BLD VENIPUNCTURE: CPT

## 2023-03-23 PROCEDURE — 6360000002 HC RX W HCPCS

## 2023-03-23 PROCEDURE — 80048 BASIC METABOLIC PNL TOTAL CA: CPT

## 2023-03-23 PROCEDURE — 85378 FIBRIN DEGRADE SEMIQUANT: CPT

## 2023-03-23 PROCEDURE — 97530 THERAPEUTIC ACTIVITIES: CPT

## 2023-03-23 PROCEDURE — 6370000000 HC RX 637 (ALT 250 FOR IP)

## 2023-03-23 PROCEDURE — 2580000003 HC RX 258: Performed by: INTERNAL MEDICINE

## 2023-03-23 PROCEDURE — 1200000000 HC SEMI PRIVATE

## 2023-03-23 PROCEDURE — 6360000002 HC RX W HCPCS: Performed by: PHYSICIAN ASSISTANT

## 2023-03-23 PROCEDURE — 2580000003 HC RX 258

## 2023-03-23 PROCEDURE — 6370000000 HC RX 637 (ALT 250 FOR IP): Performed by: INTERNAL MEDICINE

## 2023-03-23 PROCEDURE — 97535 SELF CARE MNGMENT TRAINING: CPT

## 2023-03-23 PROCEDURE — 97161 PT EVAL LOW COMPLEX 20 MIN: CPT

## 2023-03-23 PROCEDURE — 6370000000 HC RX 637 (ALT 250 FOR IP): Performed by: PHYSICIAN ASSISTANT

## 2023-03-23 PROCEDURE — 2580000003 HC RX 258: Performed by: PHYSICIAN ASSISTANT

## 2023-03-23 PROCEDURE — 2500000003 HC RX 250 WO HCPCS: Performed by: INTERNAL MEDICINE

## 2023-03-23 PROCEDURE — 85027 COMPLETE CBC AUTOMATED: CPT

## 2023-03-23 RX ADMIN — POTASSIUM CHLORIDE: 2 INJECTION, SOLUTION, CONCENTRATE INTRAVENOUS at 05:19

## 2023-03-23 RX ADMIN — OLANZAPINE 5 MG: 5 TABLET, FILM COATED ORAL at 20:19

## 2023-03-23 RX ADMIN — FOLIC ACID 1 MG: 1 TABLET ORAL at 07:48

## 2023-03-23 RX ADMIN — ENOXAPARIN SODIUM 60 MG: 100 INJECTION SUBCUTANEOUS at 20:18

## 2023-03-23 RX ADMIN — PANTOPRAZOLE SODIUM 40 MG: 40 TABLET, DELAYED RELEASE ORAL at 20:19

## 2023-03-23 RX ADMIN — POTASSIUM CHLORIDE: 2 INJECTION, SOLUTION, CONCENTRATE INTRAVENOUS at 23:34

## 2023-03-23 RX ADMIN — POTASSIUM CHLORIDE 40 MEQ: 1500 TABLET, EXTENDED RELEASE ORAL at 20:18

## 2023-03-23 RX ADMIN — HEPARIN 300 UNITS: 100 SYRINGE at 20:19

## 2023-03-23 RX ADMIN — SUCRALFATE 1 G: 1 TABLET ORAL at 20:19

## 2023-03-23 RX ADMIN — POTASSIUM CHLORIDE 40 MEQ: 1500 TABLET, EXTENDED RELEASE ORAL at 07:44

## 2023-03-23 RX ADMIN — Medication 1000 UNITS: at 07:44

## 2023-03-23 RX ADMIN — CALCIUM CARBONATE 1000 MG: 500 TABLET, CHEWABLE ORAL at 20:18

## 2023-03-23 RX ADMIN — PANTOPRAZOLE SODIUM 40 MG: 40 TABLET, DELAYED RELEASE ORAL at 07:44

## 2023-03-23 RX ADMIN — SUCRALFATE 1 G: 1 TABLET ORAL at 08:50

## 2023-03-23 RX ADMIN — Medication 10 ML: at 20:34

## 2023-03-23 RX ADMIN — SUCRALFATE 1 G: 1 TABLET ORAL at 16:55

## 2023-03-23 RX ADMIN — ONDANSETRON 4 MG: 2 INJECTION INTRAMUSCULAR; INTRAVENOUS at 22:09

## 2023-03-23 RX ADMIN — HEPARIN 300 UNITS: 100 SYRINGE at 07:46

## 2023-03-23 RX ADMIN — SODIUM CHLORIDE, PRESERVATIVE FREE 10 ML: 5 INJECTION INTRAVENOUS at 20:20

## 2023-03-23 RX ADMIN — ONDANSETRON 4 MG: 2 INJECTION INTRAMUSCULAR; INTRAVENOUS at 11:27

## 2023-03-23 RX ADMIN — IRON SUCROSE 100 MG: 20 INJECTION, SOLUTION INTRAVENOUS at 09:31

## 2023-03-23 ASSESSMENT — PAIN SCALES - GENERAL: PAINLEVEL_OUTOF10: 0

## 2023-03-24 ENCOUNTER — APPOINTMENT (OUTPATIENT)
Dept: ULTRASOUND IMAGING | Age: 63
DRG: 469 | End: 2023-03-24
Payer: COMMERCIAL

## 2023-03-24 LAB
ABO + RH BLD: NORMAL
ALBUMIN SERPL-MCNC: 2 G/DL (ref 3.5–5.2)
ALP SERPL-CCNC: 602 U/L (ref 35–104)
ALT SERPL-CCNC: 24 U/L (ref 0–32)
ANION GAP SERPL CALCULATED.3IONS-SCNC: 20 MMOL/L (ref 7–16)
APTT BLD: 38.3 SEC (ref 24.5–35.1)
AST SERPL-CCNC: 73 U/L (ref 0–31)
BACTERIA BLD CULT ORG #2: NORMAL
BASOPHILS # BLD: 0.04 E9/L (ref 0–0.2)
BASOPHILS # BLD: 0.04 E9/L (ref 0–0.2)
BASOPHILS NFR BLD: 0.4 % (ref 0–2)
BASOPHILS NFR BLD: 0.4 % (ref 0–2)
BILIRUB DIRECT SERPL-MCNC: 0.2 MG/DL (ref 0–0.3)
BILIRUB INDIRECT SERPL-MCNC: 0.3 MG/DL (ref 0–1)
BILIRUB SERPL-MCNC: 0.5 MG/DL (ref 0–1.2)
BLD GP AB SCN SERPL QL: NORMAL
BLOOD BANK DISPENSE STATUS: NORMAL
BLOOD BANK PRODUCT CODE: NORMAL
BPU ID: NORMAL
BUN SERPL-MCNC: 27 MG/DL (ref 6–23)
CALCIUM SERPL-MCNC: 8.4 MG/DL (ref 8.6–10.2)
CHLORIDE SERPL-SCNC: 103 MMOL/L (ref 98–107)
CO2 SERPL-SCNC: 22 MMOL/L (ref 22–29)
CREAT SERPL-MCNC: 1.7 MG/DL (ref 0.5–1)
DESCRIPTION BLOOD BANK: NORMAL
EOSINOPHIL # BLD: 0.05 E9/L (ref 0.05–0.5)
EOSINOPHIL # BLD: 0.07 E9/L (ref 0.05–0.5)
EOSINOPHIL NFR BLD: 0.5 % (ref 0–6)
EOSINOPHIL NFR BLD: 0.6 % (ref 0–6)
ERYTHROCYTE [DISTWIDTH] IN BLOOD BY AUTOMATED COUNT: 17.3 FL (ref 11.5–15)
ERYTHROCYTE [DISTWIDTH] IN BLOOD BY AUTOMATED COUNT: 17.8 FL (ref 11.5–15)
FERRITIN SERPL-MCNC: 1567 NG/ML
GLUCOSE SERPL-MCNC: 103 MG/DL (ref 74–99)
HCT VFR BLD AUTO: 18.6 % (ref 34–48)
HCT VFR BLD AUTO: 20.5 % (ref 34–48)
HGB BLD-MCNC: 6.6 G/DL (ref 11.5–15.5)
HGB BLD-MCNC: 6.9 G/DL (ref 11.5–15.5)
IMM GRANULOCYTES # BLD: 0.07 E9/L
IMM GRANULOCYTES # BLD: 0.07 E9/L
IMM GRANULOCYTES NFR BLD: 0.6 % (ref 0–5)
IMM GRANULOCYTES NFR BLD: 0.7 % (ref 0–5)
INR BLD: 2.1
LACTATE BLDV-SCNC: 6 MMOL/L (ref 0.5–2.2)
LDH SERPL-CCNC: 270 U/L (ref 135–214)
LYMPHOCYTES # BLD: 1.88 E9/L (ref 1.5–4)
LYMPHOCYTES # BLD: 2.16 E9/L (ref 1.5–4)
LYMPHOCYTES NFR BLD: 18 % (ref 20–42)
LYMPHOCYTES NFR BLD: 19.9 % (ref 20–42)
MAGNESIUM SERPL-MCNC: 1.5 MG/DL (ref 1.6–2.6)
MCH RBC QN AUTO: 24.9 PG (ref 26–35)
MCH RBC QN AUTO: 25.3 PG (ref 26–35)
MCHC RBC AUTO-ENTMCNC: 33.7 % (ref 32–34.5)
MCHC RBC AUTO-ENTMCNC: 35.5 % (ref 32–34.5)
MCV RBC AUTO: 71.3 FL (ref 80–99.9)
MCV RBC AUTO: 74 FL (ref 80–99.9)
MONOCYTES # BLD: 0.83 E9/L (ref 0.1–0.95)
MONOCYTES # BLD: 0.93 E9/L (ref 0.1–0.95)
MONOCYTES NFR BLD: 7.6 % (ref 2–12)
MONOCYTES NFR BLD: 8.9 % (ref 2–12)
NEUTROPHILS # BLD: 7.5 E9/L (ref 1.8–7.3)
NEUTROPHILS # BLD: 7.69 E9/L (ref 1.8–7.3)
NEUTS SEG NFR BLD: 70.9 % (ref 43–80)
NEUTS SEG NFR BLD: 71.5 % (ref 43–80)
PLATELET # BLD AUTO: 243 E9/L (ref 130–450)
PLATELET # BLD AUTO: 259 E9/L (ref 130–450)
PMV BLD AUTO: 10.2 FL (ref 7–12)
PMV BLD AUTO: 9.9 FL (ref 7–12)
POTASSIUM SERPL-SCNC: 3.5 MMOL/L (ref 3.5–5)
PROT SERPL-MCNC: 5.2 G/DL (ref 6.4–8.3)
PROTHROMBIN TIME: 22.8 SEC (ref 9.3–12.4)
RBC # BLD AUTO: 2.61 E12/L (ref 3.5–5.5)
RBC # BLD AUTO: 2.77 E12/L (ref 3.5–5.5)
SODIUM SERPL-SCNC: 145 MMOL/L (ref 132–146)
WBC # BLD: 10.5 E9/L (ref 4.5–11.5)
WBC # BLD: 10.9 E9/L (ref 4.5–11.5)

## 2023-03-24 PROCEDURE — 85610 PROTHROMBIN TIME: CPT

## 2023-03-24 PROCEDURE — 85730 THROMBOPLASTIN TIME PARTIAL: CPT

## 2023-03-24 PROCEDURE — 83605 ASSAY OF LACTIC ACID: CPT

## 2023-03-24 PROCEDURE — 2580000003 HC RX 258: Performed by: NURSE PRACTITIONER

## 2023-03-24 PROCEDURE — 6360000002 HC RX W HCPCS

## 2023-03-24 PROCEDURE — 2580000003 HC RX 258

## 2023-03-24 PROCEDURE — 6360000002 HC RX W HCPCS: Performed by: NURSE PRACTITIONER

## 2023-03-24 PROCEDURE — 76705 ECHO EXAM OF ABDOMEN: CPT

## 2023-03-24 PROCEDURE — 1200000000 HC SEMI PRIVATE

## 2023-03-24 PROCEDURE — 82728 ASSAY OF FERRITIN: CPT

## 2023-03-24 PROCEDURE — 86923 COMPATIBILITY TEST ELECTRIC: CPT

## 2023-03-24 PROCEDURE — 85025 COMPLETE CBC W/AUTO DIFF WBC: CPT

## 2023-03-24 PROCEDURE — 2500000003 HC RX 250 WO HCPCS: Performed by: INTERNAL MEDICINE

## 2023-03-24 PROCEDURE — 86850 RBC ANTIBODY SCREEN: CPT

## 2023-03-24 PROCEDURE — 2580000003 HC RX 258: Performed by: PHYSICIAN ASSISTANT

## 2023-03-24 PROCEDURE — 86901 BLOOD TYPING SEROLOGIC RH(D): CPT

## 2023-03-24 PROCEDURE — P9016 RBC LEUKOCYTES REDUCED: HCPCS

## 2023-03-24 PROCEDURE — 80076 HEPATIC FUNCTION PANEL: CPT

## 2023-03-24 PROCEDURE — 6360000002 HC RX W HCPCS: Performed by: INTERNAL MEDICINE

## 2023-03-24 PROCEDURE — 6370000000 HC RX 637 (ALT 250 FOR IP): Performed by: FAMILY MEDICINE

## 2023-03-24 PROCEDURE — 36415 COLL VENOUS BLD VENIPUNCTURE: CPT

## 2023-03-24 PROCEDURE — 36430 TRANSFUSION BLD/BLD COMPNT: CPT

## 2023-03-24 PROCEDURE — 83735 ASSAY OF MAGNESIUM: CPT

## 2023-03-24 PROCEDURE — 2580000003 HC RX 258: Performed by: INTERNAL MEDICINE

## 2023-03-24 PROCEDURE — 6360000002 HC RX W HCPCS: Performed by: PHYSICIAN ASSISTANT

## 2023-03-24 PROCEDURE — 6370000000 HC RX 637 (ALT 250 FOR IP)

## 2023-03-24 PROCEDURE — 83615 LACTATE (LD) (LDH) ENZYME: CPT

## 2023-03-24 PROCEDURE — 6370000000 HC RX 637 (ALT 250 FOR IP): Performed by: INTERNAL MEDICINE

## 2023-03-24 PROCEDURE — 80048 BASIC METABOLIC PNL TOTAL CA: CPT

## 2023-03-24 PROCEDURE — 86900 BLOOD TYPING SEROLOGIC ABO: CPT

## 2023-03-24 RX ORDER — MAGNESIUM SULFATE IN WATER 40 MG/ML
2000 INJECTION, SOLUTION INTRAVENOUS ONCE
Status: COMPLETED | OUTPATIENT
Start: 2023-03-24 | End: 2023-03-24

## 2023-03-24 RX ORDER — SODIUM CHLORIDE 9 MG/ML
INJECTION, SOLUTION INTRAVENOUS PRN
Status: DISCONTINUED | OUTPATIENT
Start: 2023-03-24 | End: 2023-03-26 | Stop reason: HOSPADM

## 2023-03-24 RX ADMIN — CALCIUM CARBONATE 1000 MG: 500 TABLET, CHEWABLE ORAL at 21:13

## 2023-03-24 RX ADMIN — ONDANSETRON 4 MG: 2 INJECTION INTRAMUSCULAR; INTRAVENOUS at 19:22

## 2023-03-24 RX ADMIN — ENOXAPARIN SODIUM 60 MG: 100 INJECTION SUBCUTANEOUS at 21:13

## 2023-03-24 RX ADMIN — OLANZAPINE 5 MG: 5 TABLET, FILM COATED ORAL at 22:01

## 2023-03-24 RX ADMIN — HEPARIN 100 UNITS: 100 SYRINGE at 21:14

## 2023-03-24 RX ADMIN — ONDANSETRON 4 MG: 2 INJECTION INTRAMUSCULAR; INTRAVENOUS at 09:01

## 2023-03-24 RX ADMIN — MAGNESIUM SULFATE HEPTAHYDRATE 2000 MG: 40 INJECTION, SOLUTION INTRAVENOUS at 18:09

## 2023-03-24 RX ADMIN — TRAMADOL HYDROCHLORIDE 50 MG: 50 TABLET ORAL at 13:48

## 2023-03-24 RX ADMIN — SUCRALFATE 1 G: 1 TABLET ORAL at 21:13

## 2023-03-24 RX ADMIN — IRON SUCROSE 100 MG: 20 INJECTION, SOLUTION INTRAVENOUS at 09:01

## 2023-03-24 RX ADMIN — PANTOPRAZOLE SODIUM 40 MG: 40 TABLET, DELAYED RELEASE ORAL at 21:13

## 2023-03-24 RX ADMIN — Medication 10 ML: at 08:53

## 2023-03-24 RX ADMIN — POTASSIUM CHLORIDE 40 MEQ: 1500 TABLET, EXTENDED RELEASE ORAL at 21:13

## 2023-03-24 RX ADMIN — Medication 10 ML: at 21:14

## 2023-03-24 RX ADMIN — HEPARIN 300 UNITS: 100 SYRINGE at 08:53

## 2023-03-24 RX ADMIN — POTASSIUM CHLORIDE: 2 INJECTION, SOLUTION, CONCENTRATE INTRAVENOUS at 08:41

## 2023-03-24 RX ADMIN — SUCRALFATE 1 G: 1 TABLET ORAL at 08:41

## 2023-03-24 RX ADMIN — SODIUM CHLORIDE, PRESERVATIVE FREE 10 ML: 5 INJECTION INTRAVENOUS at 22:01

## 2023-03-24 RX ADMIN — TRAMADOL HYDROCHLORIDE 50 MG: 50 TABLET ORAL at 21:13

## 2023-03-24 RX ADMIN — ENOXAPARIN SODIUM 60 MG: 100 INJECTION SUBCUTANEOUS at 08:54

## 2023-03-24 ASSESSMENT — PAIN DESCRIPTION - ORIENTATION: ORIENTATION: RIGHT;LEFT

## 2023-03-24 ASSESSMENT — PAIN SCALES - GENERAL
PAINLEVEL_OUTOF10: 0
PAINLEVEL_OUTOF10: 7
PAINLEVEL_OUTOF10: 8
PAINLEVEL_OUTOF10: 10

## 2023-03-24 ASSESSMENT — PAIN DESCRIPTION - LOCATION: LOCATION: FOOT

## 2023-03-24 NOTE — CARE COORDINATION
03/24/23 Update CM Note: Patient continues to await [facility] bed. Accepting physician is Dr Oziel Pan of Oncology. Will follow for bed available. She is currently to have blood today due to hgb 6.9/20.5. She is NPO.  Electronically signed by Luna Macedo RN CM on 3/24/2023 at 2:08 PM
Chart reviewed. [facility] transfer initiated yesterday by nephrology. Accepting physician is Dr. Brian Queen. Awaiting bed assignment. Family aware, and asking for transport price to [facility], in case not medically stable for family to transport. Tierra at [facility] ambulance called, and requested price for transfer to be given to the family. No one is at the bedside, and 's number was provided to update him. CM/SW will continue to follow.   Electronically signed by Farhana Belcher RN on 3/23/2023 at 2:46 PM
Chart reviewed. Per nephrology today, planning transfer to [facility] for oncology management. Continue supportive care. Management of abnormal labs. 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 Family wants to continue full code-diffuse metastatic disease. [facility] transfer initiated today by nephrology. Accepting physician is Dr. Danish Johnson. Awaiting bed assignment. Family aware, and asking for transport price to [facility], in case not medically stable for family to transport. CM/SW will continue to follow.   Electronically signed by Crissy Vasquez RN on 3/22/2023 at 2:45 -[phone]
Social Work/Case Management Transition of Care Planning (Maritza Johnson [place] [phone]): Per report, patient's CO2 was 7. ABGs have been ordered. Nephrology has been consulted. US retroperitoneal has been ordered. PT/OT evals are pending. Discharge plan is to return home with no needs. CM/SW will follow for possible needs. CARIE Arguelles  3/21/2023      Update:  Met with patient's sister and her daughter, Vanessa Montgomery, was on speaker phone. They indicated they plan for the patient to transfer to [facility] so her oncologist can manage her care. They spoke with Dr. Yamila Maurice about this and she will initiate transfer request.  Informed sister and daughter they will be responsible for the cost of transport. Daughter indicated she will take patient in her vehicle once a bed is secured. CM/SW will follow.   CARIE Arguelles  3/21/2023
Ramona Vega / Plan: Juliocesar Bolton / Product Type: *No Product type* /     Does insurance require precert for SNF:     Potential assistance Purchasing Medications:    Meds-to-Beds request: Yes      [facility] [id number] - [place] - [address]  [address] [facility] [address]  Phone: [phone] Fax: [phone]      Notes:    Factors facilitating achievement of predicted outcomes:     Barriers to discharge: Additional Case Management Notes: The Plan for Transition of Care is related to the following treatment goals of DEEDEE (acute kidney injury) ([facility] [address] [B64.9]    IF APPLICABLE: The Patient and/or patient representative Charly Calderón and her family were provided with a choice of provider and agrees with the discharge plan.  Freedom of choice list with basic dialogue that supports the patient's individualized plan of care/goals and shares the quality data associated with the providers was provided to:     Patient Representative Name:       The Patient and/or Patient Representative Agree with the Discharge Plan     Theodora Blas [facility]  Case Management Department  Ph: [phone]

## 2023-03-25 LAB
ANION GAP SERPL CALCULATED.3IONS-SCNC: 12 MMOL/L (ref 7–16)
ANION GAP SERPL CALCULATED.3IONS-SCNC: 13 MMOL/L (ref 7–16)
BASOPHILS # BLD: 0.04 E9/L (ref 0–0.2)
BASOPHILS # BLD: 0.05 E9/L (ref 0–0.2)
BASOPHILS # BLD: 0.05 E9/L (ref 0–0.2)
BASOPHILS NFR BLD: 0.4 % (ref 0–2)
BASOPHILS NFR BLD: 0.5 % (ref 0–2)
BASOPHILS NFR BLD: 0.5 % (ref 0–2)
BUN SERPL-MCNC: 28 MG/DL (ref 6–23)
BUN SERPL-MCNC: 28 MG/DL (ref 6–23)
CALCIUM SERPL-MCNC: 7.8 MG/DL (ref 8.6–10.2)
CALCIUM SERPL-MCNC: 8 MG/DL (ref 8.6–10.2)
CHLORIDE SERPL-SCNC: 100 MMOL/L (ref 98–107)
CHLORIDE SERPL-SCNC: 98 MMOL/L (ref 98–107)
CO2 SERPL-SCNC: 26 MMOL/L (ref 22–29)
CO2 SERPL-SCNC: 27 MMOL/L (ref 22–29)
CREAT SERPL-MCNC: 1.5 MG/DL (ref 0.5–1)
CREAT SERPL-MCNC: 1.6 MG/DL (ref 0.5–1)
EOSINOPHIL # BLD: 0.06 E9/L (ref 0.05–0.5)
EOSINOPHIL # BLD: 0.07 E9/L (ref 0.05–0.5)
EOSINOPHIL # BLD: 0.08 E9/L (ref 0.05–0.5)
EOSINOPHIL NFR BLD: 0.6 % (ref 0–6)
EOSINOPHIL NFR BLD: 0.7 % (ref 0–6)
EOSINOPHIL NFR BLD: 0.7 % (ref 0–6)
ERYTHROCYTE [DISTWIDTH] IN BLOOD BY AUTOMATED COUNT: 16.3 FL (ref 11.5–15)
ERYTHROCYTE [DISTWIDTH] IN BLOOD BY AUTOMATED COUNT: 16.5 FL (ref 11.5–15)
ERYTHROCYTE [DISTWIDTH] IN BLOOD BY AUTOMATED COUNT: 16.6 FL (ref 11.5–15)
GLUCOSE SERPL-MCNC: 108 MG/DL (ref 74–99)
GLUCOSE SERPL-MCNC: 114 MG/DL (ref 74–99)
HCT VFR BLD AUTO: 23.6 % (ref 34–48)
HCT VFR BLD AUTO: 23.9 % (ref 34–48)
HCT VFR BLD AUTO: 24.1 % (ref 34–48)
HCT VFR BLD AUTO: 25.7 % (ref 34–48)
HEMOCCULT SP1 STL QL: NORMAL
HGB BLD-MCNC: 8.2 G/DL (ref 11.5–15.5)
HGB BLD-MCNC: 8.2 G/DL (ref 11.5–15.5)
HGB BLD-MCNC: 8.4 G/DL (ref 11.5–15.5)
HGB BLD-MCNC: 8.9 G/DL (ref 11.5–15.5)
IMM GRANULOCYTES # BLD: 0.05 E9/L
IMM GRANULOCYTES # BLD: 0.06 E9/L
IMM GRANULOCYTES # BLD: 0.07 E9/L
IMM GRANULOCYTES NFR BLD: 0.5 % (ref 0–5)
IMM GRANULOCYTES NFR BLD: 0.6 % (ref 0–5)
IMM GRANULOCYTES NFR BLD: 0.7 % (ref 0–5)
LACTATE BLDV-SCNC: 4.4 MMOL/L (ref 0.5–2.2)
LYMPHOCYTES # BLD: 1.6 E9/L (ref 1.5–4)
LYMPHOCYTES # BLD: 1.71 E9/L (ref 1.5–4)
LYMPHOCYTES # BLD: 1.8 E9/L (ref 1.5–4)
LYMPHOCYTES NFR BLD: 14.9 % (ref 20–42)
LYMPHOCYTES NFR BLD: 16.4 % (ref 20–42)
LYMPHOCYTES NFR BLD: 16.8 % (ref 20–42)
MCH RBC QN AUTO: 25.2 PG (ref 26–35)
MCH RBC QN AUTO: 25.4 PG (ref 26–35)
MCH RBC QN AUTO: 25.8 PG (ref 26–35)
MCHC RBC AUTO-ENTMCNC: 34.3 % (ref 32–34.5)
MCHC RBC AUTO-ENTMCNC: 34.6 % (ref 32–34.5)
MCHC RBC AUTO-ENTMCNC: 35.6 % (ref 32–34.5)
MCV RBC AUTO: 72.4 FL (ref 80–99.9)
MCV RBC AUTO: 73.3 FL (ref 80–99.9)
MCV RBC AUTO: 73.4 FL (ref 80–99.9)
MONOCYTES # BLD: 0.71 E9/L (ref 0.1–0.95)
MONOCYTES # BLD: 0.76 E9/L (ref 0.1–0.95)
MONOCYTES # BLD: 0.86 E9/L (ref 0.1–0.95)
MONOCYTES NFR BLD: 6.6 % (ref 2–12)
MONOCYTES NFR BLD: 7.1 % (ref 2–12)
MONOCYTES NFR BLD: 8.2 % (ref 2–12)
NEUTROPHILS # BLD: 7.69 E9/L (ref 1.8–7.3)
NEUTROPHILS # BLD: 8 E9/L (ref 1.8–7.3)
NEUTROPHILS # BLD: 8.23 E9/L (ref 1.8–7.3)
NEUTS SEG NFR BLD: 73.6 % (ref 43–80)
NEUTS SEG NFR BLD: 74.8 % (ref 43–80)
NEUTS SEG NFR BLD: 76.4 % (ref 43–80)
PLATELET # BLD AUTO: 229 E9/L (ref 130–450)
PLATELET # BLD AUTO: 230 E9/L (ref 130–450)
PLATELET # BLD AUTO: 260 E9/L (ref 130–450)
PMV BLD AUTO: 10.3 FL (ref 7–12)
PMV BLD AUTO: 10.4 FL (ref 7–12)
PMV BLD AUTO: 10.9 FL (ref 7–12)
POTASSIUM SERPL-SCNC: 3 MMOL/L (ref 3.5–5)
POTASSIUM SERPL-SCNC: 3.1 MMOL/L (ref 3.5–5)
RBC # BLD AUTO: 3.26 E12/L (ref 3.5–5.5)
RBC # BLD AUTO: 3.26 E12/L (ref 3.5–5.5)
RBC # BLD AUTO: 3.5 E12/L (ref 3.5–5.5)
SODIUM SERPL-SCNC: 136 MMOL/L (ref 132–146)
SODIUM SERPL-SCNC: 140 MMOL/L (ref 132–146)
WBC # BLD: 10.4 E9/L (ref 4.5–11.5)
WBC # BLD: 10.7 E9/L (ref 4.5–11.5)
WBC # BLD: 10.8 E9/L (ref 4.5–11.5)

## 2023-03-25 PROCEDURE — 6370000000 HC RX 637 (ALT 250 FOR IP)

## 2023-03-25 PROCEDURE — 83605 ASSAY OF LACTIC ACID: CPT

## 2023-03-25 PROCEDURE — 2580000003 HC RX 258

## 2023-03-25 PROCEDURE — 36415 COLL VENOUS BLD VENIPUNCTURE: CPT

## 2023-03-25 PROCEDURE — 82270 OCCULT BLOOD FECES: CPT

## 2023-03-25 PROCEDURE — 99221 1ST HOSP IP/OBS SF/LOW 40: CPT | Performed by: NURSE PRACTITIONER

## 2023-03-25 PROCEDURE — 2580000003 HC RX 258: Performed by: NURSE PRACTITIONER

## 2023-03-25 PROCEDURE — 6360000002 HC RX W HCPCS: Performed by: INTERNAL MEDICINE

## 2023-03-25 PROCEDURE — 2580000003 HC RX 258: Performed by: INTERNAL MEDICINE

## 2023-03-25 PROCEDURE — 2580000003 HC RX 258: Performed by: PHYSICIAN ASSISTANT

## 2023-03-25 PROCEDURE — 6360000002 HC RX W HCPCS

## 2023-03-25 PROCEDURE — 80048 BASIC METABOLIC PNL TOTAL CA: CPT

## 2023-03-25 PROCEDURE — 1200000000 HC SEMI PRIVATE

## 2023-03-25 PROCEDURE — 2500000003 HC RX 250 WO HCPCS: Performed by: INTERNAL MEDICINE

## 2023-03-25 PROCEDURE — 6360000002 HC RX W HCPCS: Performed by: PHYSICIAN ASSISTANT

## 2023-03-25 PROCEDURE — 6360000002 HC RX W HCPCS: Performed by: NURSE PRACTITIONER

## 2023-03-25 PROCEDURE — 85025 COMPLETE CBC W/AUTO DIFF WBC: CPT

## 2023-03-25 PROCEDURE — 85014 HEMATOCRIT: CPT

## 2023-03-25 PROCEDURE — 85018 HEMOGLOBIN: CPT

## 2023-03-25 RX ORDER — HYDROCODONE BITARTRATE AND ACETAMINOPHEN 5; 325 MG/1; MG/1
1 TABLET ORAL EVERY 6 HOURS PRN
Status: DISCONTINUED | OUTPATIENT
Start: 2023-03-25 | End: 2023-03-26 | Stop reason: HOSPADM

## 2023-03-25 RX ORDER — 0.9 % SODIUM CHLORIDE 0.9 %
500 INTRAVENOUS SOLUTION INTRAVENOUS ONCE
Status: COMPLETED | OUTPATIENT
Start: 2023-03-25 | End: 2023-03-25

## 2023-03-25 RX ORDER — HYDROMORPHONE HYDROCHLORIDE 1 MG/ML
0.25 INJECTION, SOLUTION INTRAMUSCULAR; INTRAVENOUS; SUBCUTANEOUS ONCE
Status: DISCONTINUED | OUTPATIENT
Start: 2023-03-25 | End: 2023-03-26 | Stop reason: HOSPADM

## 2023-03-25 RX ORDER — SODIUM CHLORIDE, SODIUM LACTATE, POTASSIUM CHLORIDE, CALCIUM CHLORIDE 600; 310; 30; 20 MG/100ML; MG/100ML; MG/100ML; MG/100ML
INJECTION, SOLUTION INTRAVENOUS CONTINUOUS
Status: DISCONTINUED | OUTPATIENT
Start: 2023-03-25 | End: 2023-03-26

## 2023-03-25 RX ADMIN — HEPARIN 100 UNITS: 100 SYRINGE at 20:37

## 2023-03-25 RX ADMIN — Medication 10 ML: at 20:36

## 2023-03-25 RX ADMIN — IRON SUCROSE 100 MG: 20 INJECTION, SOLUTION INTRAVENOUS at 11:02

## 2023-03-25 RX ADMIN — SODIUM CHLORIDE, PRESERVATIVE FREE 10 ML: 5 INJECTION INTRAVENOUS at 11:22

## 2023-03-25 RX ADMIN — ENOXAPARIN SODIUM 60 MG: 100 INJECTION SUBCUTANEOUS at 12:39

## 2023-03-25 RX ADMIN — ACETAMINOPHEN 650 MG: 650 SUPPOSITORY RECTAL at 21:02

## 2023-03-25 RX ADMIN — POTASSIUM CHLORIDE: 2 INJECTION, SOLUTION, CONCENTRATE INTRAVENOUS at 01:37

## 2023-03-25 RX ADMIN — POTASSIUM CHLORIDE: 2 INJECTION, SOLUTION, CONCENTRATE INTRAVENOUS at 10:36

## 2023-03-25 RX ADMIN — ONDANSETRON 4 MG: 2 INJECTION INTRAMUSCULAR; INTRAVENOUS at 21:01

## 2023-03-25 RX ADMIN — SODIUM CHLORIDE 500 ML: 9 INJECTION, SOLUTION INTRAVENOUS at 17:15

## 2023-03-25 RX ADMIN — ONDANSETRON 4 MG: 2 INJECTION INTRAMUSCULAR; INTRAVENOUS at 12:39

## 2023-03-25 RX ADMIN — SODIUM CHLORIDE, POTASSIUM CHLORIDE, SODIUM LACTATE AND CALCIUM CHLORIDE: 600; 310; 30; 20 INJECTION, SOLUTION INTRAVENOUS at 18:10

## 2023-03-25 RX ADMIN — ENOXAPARIN SODIUM 60 MG: 100 INJECTION SUBCUTANEOUS at 20:36

## 2023-03-25 ASSESSMENT — PAIN DESCRIPTION - DESCRIPTORS
DESCRIPTORS: ACHING;DISCOMFORT;CRAMPING
DESCRIPTORS: ACHING;CRAMPING;DISCOMFORT

## 2023-03-25 ASSESSMENT — PAIN SCALES - GENERAL
PAINLEVEL_OUTOF10: 8
PAINLEVEL_OUTOF10: 8

## 2023-03-25 ASSESSMENT — PAIN DESCRIPTION - LOCATION
LOCATION: ABDOMEN
LOCATION: ABDOMEN

## 2023-03-25 ASSESSMENT — PAIN DESCRIPTION - ORIENTATION: ORIENTATION: MID

## 2023-03-26 VITALS
BODY MASS INDEX: 18.52 KG/M2 | SYSTOLIC BLOOD PRESSURE: 90 MMHG | HEART RATE: 104 BPM | HEIGHT: 68 IN | WEIGHT: 122.2 LBS | TEMPERATURE: 98.8 F | RESPIRATION RATE: 18 BRPM | DIASTOLIC BLOOD PRESSURE: 60 MMHG | OXYGEN SATURATION: 99 %

## 2023-03-26 LAB
ANION GAP SERPL CALCULATED.3IONS-SCNC: 10 MMOL/L (ref 7–16)
ANION GAP SERPL CALCULATED.3IONS-SCNC: 14 MMOL/L (ref 7–16)
BASOPHILS # BLD: 0.02 E9/L (ref 0–0.2)
BASOPHILS NFR BLD: 0.2 % (ref 0–2)
BUN SERPL-MCNC: 28 MG/DL (ref 6–23)
BUN SERPL-MCNC: 28 MG/DL (ref 6–23)
CALCIUM SERPL-MCNC: 7.7 MG/DL (ref 8.6–10.2)
CALCIUM SERPL-MCNC: 8 MG/DL (ref 8.6–10.2)
CHLORIDE SERPL-SCNC: 102 MMOL/L (ref 98–107)
CHLORIDE SERPL-SCNC: 103 MMOL/L (ref 98–107)
CO2 SERPL-SCNC: 24 MMOL/L (ref 22–29)
CO2 SERPL-SCNC: 25 MMOL/L (ref 22–29)
CREAT SERPL-MCNC: 1.5 MG/DL (ref 0.5–1)
CREAT SERPL-MCNC: 1.5 MG/DL (ref 0.5–1)
EOSINOPHIL # BLD: 0.02 E9/L (ref 0.05–0.5)
EOSINOPHIL NFR BLD: 0.2 % (ref 0–6)
ERYTHROCYTE [DISTWIDTH] IN BLOOD BY AUTOMATED COUNT: 16.7 FL (ref 11.5–15)
GLUCOSE SERPL-MCNC: 67 MG/DL (ref 74–99)
GLUCOSE SERPL-MCNC: 75 MG/DL (ref 74–99)
HCT VFR BLD AUTO: 22.9 % (ref 34–48)
HGB BLD-MCNC: 7.9 G/DL (ref 11.5–15.5)
IMM GRANULOCYTES # BLD: 0.06 E9/L
IMM GRANULOCYTES NFR BLD: 0.5 % (ref 0–5)
LYMPHOCYTES # BLD: 1.38 E9/L (ref 1.5–4)
LYMPHOCYTES NFR BLD: 12.2 % (ref 20–42)
MAGNESIUM SERPL-MCNC: 1.8 MG/DL (ref 1.6–2.6)
MCH RBC QN AUTO: 25.6 PG (ref 26–35)
MCHC RBC AUTO-ENTMCNC: 34.5 % (ref 32–34.5)
MCV RBC AUTO: 74.1 FL (ref 80–99.9)
MONOCYTES # BLD: 0.84 E9/L (ref 0.1–0.95)
MONOCYTES NFR BLD: 7.5 % (ref 2–12)
NEUTROPHILS # BLD: 8.95 E9/L (ref 1.8–7.3)
NEUTS SEG NFR BLD: 79.4 % (ref 43–80)
PHOSPHATE SERPL-MCNC: 4.6 MG/DL (ref 2.5–4.5)
PLATELET # BLD AUTO: 221 E9/L (ref 130–450)
PMV BLD AUTO: 10.3 FL (ref 7–12)
POTASSIUM SERPL-SCNC: 2.9 MMOL/L (ref 3.5–5)
POTASSIUM SERPL-SCNC: 3.9 MMOL/L (ref 3.5–5)
RBC # BLD AUTO: 3.09 E12/L (ref 3.5–5.5)
SODIUM SERPL-SCNC: 137 MMOL/L (ref 132–146)
SODIUM SERPL-SCNC: 141 MMOL/L (ref 132–146)
WBC # BLD: 11.3 E9/L (ref 4.5–11.5)

## 2023-03-26 PROCEDURE — 2580000003 HC RX 258: Performed by: PHYSICIAN ASSISTANT

## 2023-03-26 PROCEDURE — 6360000002 HC RX W HCPCS: Performed by: PHYSICIAN ASSISTANT

## 2023-03-26 PROCEDURE — 83735 ASSAY OF MAGNESIUM: CPT

## 2023-03-26 PROCEDURE — 84100 ASSAY OF PHOSPHORUS: CPT

## 2023-03-26 PROCEDURE — 6360000002 HC RX W HCPCS

## 2023-03-26 PROCEDURE — 6370000000 HC RX 637 (ALT 250 FOR IP): Performed by: INTERNAL MEDICINE

## 2023-03-26 PROCEDURE — 6360000002 HC RX W HCPCS: Performed by: INTERNAL MEDICINE

## 2023-03-26 PROCEDURE — 2580000003 HC RX 258: Performed by: INTERNAL MEDICINE

## 2023-03-26 PROCEDURE — 80048 BASIC METABOLIC PNL TOTAL CA: CPT

## 2023-03-26 PROCEDURE — 36415 COLL VENOUS BLD VENIPUNCTURE: CPT

## 2023-03-26 PROCEDURE — 85025 COMPLETE CBC W/AUTO DIFF WBC: CPT

## 2023-03-26 RX ORDER — DEXTROSE, SODIUM CHLORIDE, SODIUM LACTATE, POTASSIUM CHLORIDE, AND CALCIUM CHLORIDE 5; .6; .31; .03; .02 G/100ML; G/100ML; G/100ML; G/100ML; G/100ML
INJECTION, SOLUTION INTRAVENOUS CONTINUOUS
Status: DISCONTINUED | OUTPATIENT
Start: 2023-03-26 | End: 2023-03-26 | Stop reason: HOSPADM

## 2023-03-26 RX ORDER — 0.9 % SODIUM CHLORIDE 0.9 %
500 INTRAVENOUS SOLUTION INTRAVENOUS ONCE
Status: COMPLETED | OUTPATIENT
Start: 2023-03-26 | End: 2023-03-26

## 2023-03-26 RX ORDER — POTASSIUM CHLORIDE 7.45 MG/ML
10 INJECTION INTRAVENOUS
Status: COMPLETED | OUTPATIENT
Start: 2023-03-26 | End: 2023-03-26

## 2023-03-26 RX ADMIN — SODIUM CHLORIDE, POTASSIUM CHLORIDE, SODIUM LACTATE AND CALCIUM CHLORIDE: 600; 310; 30; 20 INJECTION, SOLUTION INTRAVENOUS at 12:50

## 2023-03-26 RX ADMIN — SODIUM CHLORIDE, POTASSIUM CHLORIDE, SODIUM LACTATE AND CALCIUM CHLORIDE: 600; 310; 30; 20 INJECTION, SOLUTION INTRAVENOUS at 09:37

## 2023-03-26 RX ADMIN — POTASSIUM CHLORIDE 10 MEQ: 7.46 INJECTION, SOLUTION INTRAVENOUS at 09:09

## 2023-03-26 RX ADMIN — POTASSIUM CHLORIDE 10 MEQ: 7.46 INJECTION, SOLUTION INTRAVENOUS at 12:48

## 2023-03-26 RX ADMIN — POTASSIUM CHLORIDE 10 MEQ: 7.46 INJECTION, SOLUTION INTRAVENOUS at 10:22

## 2023-03-26 RX ADMIN — ONDANSETRON 4 MG: 2 INJECTION INTRAMUSCULAR; INTRAVENOUS at 16:52

## 2023-03-26 RX ADMIN — POTASSIUM CHLORIDE 10 MEQ: 7.46 INJECTION, SOLUTION INTRAVENOUS at 08:01

## 2023-03-26 RX ADMIN — ONDANSETRON 4 MG: 2 INJECTION INTRAMUSCULAR; INTRAVENOUS at 04:05

## 2023-03-26 RX ADMIN — SODIUM CHLORIDE 500 ML: 9 INJECTION, SOLUTION INTRAVENOUS at 11:27

## 2023-03-26 RX ADMIN — POTASSIUM CHLORIDE 10 MEQ: 7.46 INJECTION, SOLUTION INTRAVENOUS at 07:08

## 2023-03-26 RX ADMIN — SODIUM CHLORIDE, SODIUM LACTATE, POTASSIUM CHLORIDE, CALCIUM CHLORIDE AND DEXTROSE MONOHYDRATE: 5; 600; 310; 30; 20 INJECTION, SOLUTION INTRAVENOUS at 16:48

## 2023-03-26 RX ADMIN — POTASSIUM CHLORIDE 10 MEQ: 7.46 INJECTION, SOLUTION INTRAVENOUS at 11:37

## 2023-03-26 RX ADMIN — IRON SUCROSE 100 MG: 20 INJECTION, SOLUTION INTRAVENOUS at 09:15

## 2023-03-26 RX ADMIN — POTASSIUM BICARBONATE 40 MEQ: 782 TABLET, EFFERVESCENT ORAL at 13:24

## 2023-03-26 NOTE — H&P
Attempted to call nurse to nurse to rincon they will be calling back.
(acute kidney injury) ([facility] [address]  Resolved Problems:    * No resolved hospital problems. 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     DVT prophylaxis Lovenox  PT/OT  Discharge planning Home    Code status: Full  Requires Inpatient level of care    Sofia Landa MD

## 2023-03-26 NOTE — PLAN OF CARE
Problem: Safety - Adult  Goal: Free from fall injury  3/20/2023 2321 by Jag Barton RN  Outcome: Progressing     Problem: Confusion  Goal: Confusion, delirium, dementia, or psychosis is improved or at baseline  Description: INTERVENTIONS:  1. Assess for possible contributors to thought disturbance, including medications, impaired vision or hearing, underlying metabolic abnormalities, dehydration, psychiatric diagnoses, and notify attending LIP  2. [place] high risk fall precautions, as indicated  3. Provide frequent short contacts to provide reality reorientation, refocusing and direction  4. Decrease environmental stimuli, including noise as appropriate  5. Monitor and intervene to maintain adequate nutrition, hydration, elimination, sleep and activity  6. If unable to ensure safety without constant attention obtain sitter and review sitter guidelines with assigned personnel  7.  Initiate Psychosocial CNS and Spiritual Care consult, as indicated  3/20/2023 2321 by Jag Barton RN  Outcome: Progressing  Flowsheets (Taken 3/20/2023 2130)  Effect of thought disturbance (confusion, delirium, dementia, or psychosis) are managed with adequate functional status: Monitor and intervene to maintain adequate nutrition, hydration, elimination, sleep and activity     Problem: Pain  Goal: Verbalizes/displays adequate comfort level or baseline comfort level  Outcome: Progressing
Problem: Safety - Adult  Goal: Free from fall injury  Outcome: Progressing
Problem: Safety - Adult  Goal: Free from fall injury  Outcome: Progressing     Problem: Confusion  Goal: Confusion, delirium, dementia, or psychosis is improved or at baseline  Description: INTERVENTIONS:  1. Assess for possible contributors to thought disturbance, including medications, impaired vision or hearing, underlying metabolic abnormalities, dehydration, psychiatric diagnoses, and notify attending LIP  2. [place] high risk fall precautions, as indicated  3. Provide frequent short contacts to provide reality reorientation, refocusing and direction  4. Decrease environmental stimuli, including noise as appropriate  5. Monitor and intervene to maintain adequate nutrition, hydration, elimination, sleep and activity  6. If unable to ensure safety without constant attention obtain sitter and review sitter guidelines with assigned personnel  7.  Initiate Psychosocial CNS and Spiritual Care consult, as indicated  Outcome: Progressing     Problem: Pain  Goal: Verbalizes/displays adequate comfort level or baseline comfort level  Outcome: Progressing  Flowsheets (Taken 3/21/2023 2033)  Verbalizes/displays adequate comfort level or baseline comfort level: Encourage patient to monitor pain and request assistance
Problem: Skin/Tissue Integrity  Goal: Absence of new skin breakdown  Description: 1. Monitor for areas of redness and/or skin breakdown  2. Assess vascular access sites hourly  3. Every 4-6 hours minimum:  Change oxygen saturation probe site  4. Every 4-6 hours:  If on nasal continuous positive airway pressure, respiratory therapy assess nares and determine need for appliance change or resting period. 3/25/2023 2207 by Brian Roblero RN  Outcome: Progressing  3/25/2023 1339 by Abhi Sung RN  Outcome: Progressing     Problem: Safety - Adult  Goal: Free from fall injury  3/25/2023 2207 by Brian Roblero RN  Outcome: Progressing  3/25/2023 1339 by Abhi Sung RN  Outcome: Progressing     Problem: Confusion  Goal: Confusion, delirium, dementia, or psychosis is improved or at baseline  Description: INTERVENTIONS:  1. Assess for possible contributors to thought disturbance, including medications, impaired vision or hearing, underlying metabolic abnormalities, dehydration, psychiatric diagnoses, and notify attending LIP  2. [place] high risk fall precautions, as indicated  3. Provide frequent short contacts to provide reality reorientation, refocusing and direction  4. Decrease environmental stimuli, including noise as appropriate  5. Monitor and intervene to maintain adequate nutrition, hydration, elimination, sleep and activity  6. If unable to ensure safety without constant attention obtain sitter and review sitter guidelines with assigned personnel  7.  Initiate Psychosocial CNS and Spiritual Care consult, as indicated  3/25/2023 2207 by Brian Roblero RN  Outcome: Progressing  3/25/2023 1339 by Abhi Sung RN  Outcome: Progressing     Problem: Pain  Goal: Verbalizes/displays adequate comfort level or baseline comfort level  3/25/2023 2207 by Brian Roblero RN  Outcome: Progressing  3/25/2023 1339 by Abhi Sung RN  Outcome: Progressing     Problem: ABCDS Injury
Problem: Skin/Tissue Integrity  Goal: Absence of new skin breakdown  Description: 1. Monitor for areas of redness and/or skin breakdown  2. Assess vascular access sites hourly  3. Every 4-6 hours minimum:  Change oxygen saturation probe site  4. Every 4-6 hours:  If on nasal continuous positive airway pressure, respiratory therapy assess nares and determine need for appliance change or resting period. 3/26/2023 1116 by Nani Green RN  Outcome: Progressing  3/25/2023 2207 by Naty Childs RN  Outcome: Progressing     Problem: Safety - Adult  Goal: Free from fall injury  3/26/2023 1116 by Nani Green RN  Outcome: Progressing  3/25/2023 2207 by Naty Childs RN  Outcome: Progressing     Problem: Confusion  Goal: Confusion, delirium, dementia, or psychosis is improved or at baseline  Description: INTERVENTIONS:  1. Assess for possible contributors to thought disturbance, including medications, impaired vision or hearing, underlying metabolic abnormalities, dehydration, psychiatric diagnoses, and notify attending LIP  2. [place] high risk fall precautions, as indicated  3. Provide frequent short contacts to provide reality reorientation, refocusing and direction  4. Decrease environmental stimuli, including noise as appropriate  5. Monitor and intervene to maintain adequate nutrition, hydration, elimination, sleep and activity  6. If unable to ensure safety without constant attention obtain sitter and review sitter guidelines with assigned personnel  7.  Initiate Psychosocial CNS and Spiritual Care consult, as indicated  3/26/2023 1116 by Nani Green RN  Outcome: Progressing  3/25/2023 2207 by Naty Childs RN  Outcome: Progressing     Problem: Nutrition Deficit:  Goal: Optimize nutritional status  3/26/2023 1116 by Nani Green RN  Outcome: Progressing  3/25/2023 2207 by Naty Childs RN  Outcome: Progressing     Problem: Pain  Goal: Verbalizes/displays adequate
Problem: Skin/Tissue Integrity  Goal: Absence of new skin breakdown  Description: 1. Monitor for areas of redness and/or skin breakdown  2. Assess vascular access sites hourly  3. Every 4-6 hours minimum:  Change oxygen saturation probe site  4. Every 4-6 hours:  If on nasal continuous positive airway pressure, respiratory therapy assess nares and determine need for appliance change or resting period. Outcome: Progressing     Problem: Safety - Adult  Goal: Free from fall injury  3/19/2023 0418 by Latasha Salas RN  Outcome: Progressing  3/19/2023 0412 by Venkatesh Sagastume RN  Outcome: Progressing     Problem: Confusion  Goal: Confusion, delirium, dementia, or psychosis is improved or at baseline  Description: INTERVENTIONS:  1. Assess for possible contributors to thought disturbance, including medications, impaired vision or hearing, underlying metabolic abnormalities, dehydration, psychiatric diagnoses, and notify attending LIP  2. [place] high risk fall precautions, as indicated  3. Provide frequent short contacts to provide reality reorientation, refocusing and direction  4. Decrease environmental stimuli, including noise as appropriate  5. Monitor and intervene to maintain adequate nutrition, hydration, elimination, sleep and activity  6. If unable to ensure safety without constant attention obtain sitter and review sitter guidelines with assigned personnel  7.  Initiate Psychosocial CNS and Spiritual Care consult, as indicated  Outcome: Progressing
Problem: Skin/Tissue Integrity  Goal: Absence of new skin breakdown  Description: 1. Monitor for areas of redness and/or skin breakdown  2. Assess vascular access sites hourly  3. Every 4-6 hours minimum:  Change oxygen saturation probe site  4. Every 4-6 hours:  If on nasal continuous positive airway pressure, respiratory therapy assess nares and determine need for appliance change or resting period. Outcome: Progressing     Problem: Safety - Adult  Goal: Free from fall injury  Outcome: Progressing     Problem: Confusion  Goal: Confusion, delirium, dementia, or psychosis is improved or at baseline  Description: INTERVENTIONS:  1. Assess for possible contributors to thought disturbance, including medications, impaired vision or hearing, underlying metabolic abnormalities, dehydration, psychiatric diagnoses, and notify attending LIP  2. [place] high risk fall precautions, as indicated  3. Provide frequent short contacts to provide reality reorientation, refocusing and direction  4. Decrease environmental stimuli, including noise as appropriate  5. Monitor and intervene to maintain adequate nutrition, hydration, elimination, sleep and activity  6. If unable to ensure safety without constant attention obtain sitter and review sitter guidelines with assigned personnel  7.  Initiate Psychosocial CNS and Spiritual Care consult, as indicated  Outcome: Progressing     Problem: Nutrition Deficit:  Goal: Optimize nutritional status  Outcome: Progressing     Problem: Pain  Goal: Verbalizes/displays adequate comfort level or baseline comfort level  Outcome: Progressing     Problem: ABCDS Injury Assessment  Goal: Absence of physical injury  Outcome: Progressing
Problem: Skin/Tissue Integrity  Goal: Absence of new skin breakdown  Description: 1. Monitor for areas of redness and/or skin breakdown  2. Assess vascular access sites hourly  3. Every 4-6 hours minimum:  Change oxygen saturation probe site  4. Every 4-6 hours:  If on nasal continuous positive airway pressure, respiratory therapy assess nares and determine need for appliance change or resting period. Outcome: Progressing     Problem: Safety - Adult  Goal: Free from fall injury  Outcome: Progressing     Problem: Confusion  Goal: Confusion, delirium, dementia, or psychosis is improved or at baseline  Description: INTERVENTIONS:  1. Assess for possible contributors to thought disturbance, including medications, impaired vision or hearing, underlying metabolic abnormalities, dehydration, psychiatric diagnoses, and notify attending LIP  2. [place] high risk fall precautions, as indicated  3. Provide frequent short contacts to provide reality reorientation, refocusing and direction  4. Decrease environmental stimuli, including noise as appropriate  5. Monitor and intervene to maintain adequate nutrition, hydration, elimination, sleep and activity  6. If unable to ensure safety without constant attention obtain sitter and review sitter guidelines with assigned personnel  7.  Initiate Psychosocial CNS and Spiritual Care consult, as indicated  Outcome: Progressing     Problem: Nutrition Deficit:  Goal: Optimize nutritional status  Outcome: Progressing
Progressing     Problem: ABCDS Injury Assessment  Goal: Absence of physical injury  Outcome: Progressing
comfort level  3/25/2023 1339 by Eleuterio Parrish RN  Outcome: Progressing  3/25/2023 0635 by Pratik Alberto RN  Outcome: Progressing     Problem: ABCDS Injury Assessment  Goal: Absence of physical injury  3/25/2023 1339 by Eleuterio Parrish RN  Outcome: Progressing  3/25/2023 0635 by Pratik Alberto RN  Outcome: Progressing

## 2023-03-26 NOTE — CONSULTS
BS+  M/S/Ext:  Moving all extremities, no edema, pulses present  Skin:  Warm and dry  Neuro:  PERRL, oriented x 3; following commands    Objective data reviewed: labs, images, records, medication use, vitals, and chart    Time/Communication:  Greater than 50% of time spent, total 40 minutes in counseling and coordination of care at the bedside regarding goals of care, symptom management, and see above. MAXIMUS Gomez - CNP  Palliative Medicine    Patient and the plan of care discussed with the other IDT members of Palliative Care Team, and with consultants, Primary Attending, patient, family, and floor nurses, as appropriate and available. Thank you for allowing Palliative Medicine to participate in the care of Bryon Borden. Note: This report was completed using [email or web address] voiced recognition software. Every effort has been made to ensure accuracy; however, inadvertent computerized transcription errors may be present.
Diffuse osseous metastatic disease again noted. Thyromegaly again noted. No evidence of pulmonary embolism or acute pulmonary abnormality. Unchanged left lower lobe and right upper lobe nodules, which remain concerning for metastatic disease. Diffuse osseous metastatic disease. ASSESSMENT/PLAN:    65 y/o female with left iliac artery occlusive disease, likely subacute, in the setting of stage IV metastatic carcinoma     No plans for vascular surgery intervention at this time. No need for anticoagulation given subacute nature and lack of symptoms of acute occlusion. [id number] Made[address] Martinez for discharge from Vascular Surgery perspective without [address].      Discussed with Dr. Melissa Madrigal -- Beto Marshall MD  Surgery Resident PGY-2  3/20/2023  12:54 PM
Ezequiel Miller - CNP on 3/21/2023 at 4:40 PM

## 2023-03-26 NOTE — DISCHARGE INSTR - COC
Continuity of Care Form    Patient Name: Eamon Govea   :  1960  MRN:  [id number]    Admit date:  3/18/2023  Discharge date:  3/26/23    Code Status Order: Full Code   Advance Directives:     Admitting Physician:  Sonia Meadows DO  PCP: J Luis Monteiro DO    Discharging Nurse:   [address] Unit/Room#: [address] Unit Phone Number: [phone]    Emergency Contact:   Extended Emergency Contact Information  Primary Emergency Contact: Wicho Perez  Address: [address]           [address] Phone: [phone]  Mobile Phone: [phone]  Relation: Spouse  Secondary Emergency Contact: [address] [place] [address] Phone: [phone]  Mobile Phone: [phone]  Relation: Child  Preferred language: English   needed? No    Past Surgical History:  Past Surgical History:   Procedure Laterality Date    COLONOSCOPY  2018    few diverticula--[place]    CT BIOPSY PERCUTANEOUS SUPERFICIAL BONE  11/10/2022    CT BIOPSY PERCUTANEOUS SUPERFICIAL BONE 11/10/2022 [facility]    CT NEEDLE BIOPSY LIVER PERCUTANEOUS  2022    CT NEEDLE BIOPSY LIVER PERCUTANEOUS 2022 Marjorie Small MD [facility]    GASTRIC BYPASS SURGERY  2019    UPPER GASTROINTESTINAL ENDOSCOPY  2018    mild gastritis; small hiatal hernia--[place]       Immunization History: There is no immunization history on file for this patient.     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

## 2023-03-27 NOTE — PROGRESS NOTES
[id number] [place] [address]        Date:3/20/2023                                                  Patient Name: Corona Reno    MRN: [id number]    : 1960    Room: [address] [place] [address]      Evaluating OT: Geronimo Dockery, Kendrick Velazquez OTR/L; [id number]      Referring Provider: MAXIMUS Ruvalcaba CNP    Specific Provider Orders/Date: OT Eval and Treat 3/19/23      Diagnosis: DEEDEE (acute kidney injury)   Thromboembolism of iliac artery  Severe protein-calorie malnutrition     Surgery: none this admission     Pertinent Medical History:  has a past medical history of Cancer ([facility] [address] Hyperlipidemia, Hypertension, and Prediabetes.       Reason for Admission: fatigue/dizziness, fever, nausea/decreased appetite    Recommended Adaptive Equipment:  TBD pending progression/discharge plan     Precautions:  Fall Risk, Fatigue, Bed Alarm,      Assessment of current deficits:    [x] Functional mobility  [x]ADLs  [x] Strength               [x]Cognition    [x] Functional transfers   [x] IADLs         [x] Safety Awareness   [x]Endurance    [x] Fine Coordination              [x] Balance      [] Vision/perception   []Sensation     []Gross Motor Coordination  [] ROM  [] Delirium                   [] Motor Control     OT PLAN OF CARE   OT POC based on physician orders, patient diagnosis and results of clinical assessment    Frequency/Duration: 1-3 days/wk for 2 weeks PRN   Specific OT Treatment Interventions to include:   * Instruction/training on adapted ADL techniques and AE recommendations to increase functional independence within precautions       * Training on energy conservation strategies, correct breathing pattern and techniques to improve independence/tolerance for self-care routine  * Functional transfer/mobility training/DME recommendations for increased independence, safety, and fall prevention  * Patient/Family
[facility] MD Carlin Delarosa MD Luisa Rudder, MD Juju Faith, MD Luis Barraza, JANELLE Slaughter, OZZY  Progress Note    3/24/2023    SUBJECTIVE:   3/22:  Complains of feet pain. Denies abdominal discomfort. Denies chest pain or SOB. Appetite is poor. Taking in liquids sluggishly. Family at bedside. 3/23:  Weak and fatigued. Complains of abdominal discomfort today. Appetite remains poor. 3/24: Seen while laying in bed. Still having diarrhea. She reports up to 4x per day. Appetite is poor. Denies chest pain or dyspnea. She is on room air. Blood pressures remain low. PROBLEM LIST:    Principal Problem:    DEEDEE (acute kidney injury) ([address]  Active Problems: Thromboembolism of iliac artery (HCC)    Severe protein-calorie malnutrition (HCC)  Resolved Problems:    * No resolved hospital problems.  *         DIET:    Diet NPO     MEDS (scheduled):    magnesium sulfate  2,000 mg IntraVENous Once    iron sucrose  100 mg IntraVENous Daily    potassium chloride  40 mEq Oral TID    lidocaine  5 mL IntraDERmal Once    sodium chloride flush  5-40 mL IntraVENous 2 times per day    heparin flush  1 mL IntraVENous 2 times per day    folic acid  1 mg Oral Daily    calcium carbonate  2 tablet Oral BID    OLANZapine  5 mg Oral Nightly    pantoprazole  40 mg Oral BID    sucralfate  1 g Oral 4x Daily    Vitamin D  1,000 Units Oral Daily    sodium chloride flush  5-40 mL IntraVENous 2 times per day    heparin flush  300 Units IntraCATHeter BID    enoxaparin  1 mg/kg SubCUTAneous BID       MEDS (infusions):   sodium chloride      IV infusion builder 150 mL/hr at 03/24/23 0841    sodium chloride         MEDS (prn):  sodium chloride, traMADol, sodium chloride flush, sodium chloride, heparin flush, diphenoxylate-atropine, sodium chloride flush, ondansetron **OR** ondansetron, acetaminophen **OR** acetaminophen    PHYSICAL EXAM:     Patient Vitals for the past 24
[facility] MD Cathie Bhakta, MD Lilia Hidalgo, MD Oanh Montero, MD Daniel Ceron, CNP   Kira Slaughter, OZZY  Progress Note    3/22/2023    SUBJECTIVE:   3/22:  Complains of feet pain. Denies abdominal discomfort. Denies chest pain or SOB. Appetite is poor. Taking in liquids sluggishly. Family at bedside. PROBLEM LIST:    Principal Problem:    DEEDEE (acute kidney injury) ([address]  Active Problems: Thromboembolism of iliac artery (HCC)    Severe protein-calorie malnutrition (HCC)  Resolved Problems:    * No resolved hospital problems. *         DIET:    ADULT DIET;  Dysphagia - Soft and Bite Sized  ADULT ORAL NUTRITION SUPPLEMENT; Breakfast, Lunch, Dinner; Standard High Calorie/High Protein Oral Supplement     MEDS (scheduled):    iron sucrose  100 mg IntraVENous Daily    potassium chloride  40 mEq Oral BID    lidocaine  5 mL IntraDERmal Once    sodium chloride flush  5-40 mL IntraVENous 2 times per day    heparin flush  1 mL IntraVENous 2 times per day    folic acid  1 mg Oral Daily    calcium carbonate  2 tablet Oral BID    OLANZapine  5 mg Oral Nightly    pantoprazole  40 mg Oral BID    sucralfate  1 g Oral 4x Daily    Vitamin D  1,000 Units Oral Daily    sodium chloride flush  5-40 mL IntraVENous 2 times per day    heparin flush  300 Units IntraCATHeter BID    [Held by provider] enoxaparin  1 mg/kg SubCUTAneous BID       MEDS (infusions):   IV infusion builder      sodium chloride         MEDS (prn):  traMADol, sodium chloride flush, sodium chloride, heparin flush, diphenoxylate-atropine, sodium chloride flush, ondansetron **OR** ondansetron, acetaminophen **OR** acetaminophen    PHYSICAL EXAM:     Patient Vitals for the past 24 hrs:   BP Temp Temp src Pulse Resp SpO2   03/22/23 0731 90/71 97.8 °F (36.6 °C) Temporal (!) 116 16 92 %   03/22/23 0649 -- -- -- -- 16 --   03/21/23 2033 99/80 98.1 °F (36.7 °C) Temporal (!) 112 16 100 %     @      Intake/Output
[facility] MD Leila Thompson, MD Raul Deleon, JANELLE Salughter, OZZY Miner, JANELLE  Progress Note    3/25/2023    SUBJECTIVE:   3/22:  Complains of feet pain. Denies abdominal discomfort. Denies chest pain or SOB. Appetite is poor. Taking in liquids sluggishly. Family at bedside. 3/23:  Weak and fatigued. Complains of abdominal discomfort today. Appetite remains poor. 3/24: Seen while laying in bed. Still having diarrhea. She reports up to 4x per day. Appetite is poor. Denies chest pain or dyspnea. She is on room air. Blood pressures remain low.     3/25: \"I felt better. \"  Lightheaded. No chest pain or palpitations. Ongoing fatigue, generalized weakness. Ongoing anorexia. Denies nausea or vomiting. No peripheral swelling. Ongoing diarrhea. No melena or hematochezia    PROBLEM LIST:    Principal Problem:    DEEDEE (acute kidney injury) ([facility] [address]  Active Problems: Thromboembolism of iliac artery (HCC)    Severe protein-calorie malnutrition (HCC)  Resolved Problems:    * No resolved hospital problems. *         DIET:    Diet NPO     MEDS (scheduled):     HYDROmorphone  0.25 mg IntraVENous Once    sodium chloride  500 mL IntraVENous Once    iron sucrose  100 mg IntraVENous Daily    potassium chloride  40 mEq Oral TID    lidocaine  5 mL IntraDERmal Once    sodium chloride flush  5-40 mL IntraVENous 2 times per day    heparin flush  1 mL IntraVENous 2 times per day    folic acid  1 mg Oral Daily    calcium carbonate  2 tablet Oral BID    OLANZapine  5 mg Oral Nightly    pantoprazole  40 mg Oral BID    sucralfate  1 g Oral 4x Daily    Vitamin D  1,000 Units Oral Daily    sodium chloride flush  5-40 mL IntraVENous 2 times per day    heparin flush  300 Units IntraCATHeter BID    enoxaparin  1 mg/kg SubCUTAneous BID       MEDS (infusions):   sodium chloride      IV infusion builder 150 mL/hr at 03/25/23 1036    sodium chloride
[facility] MD Leila Thompson, MD Raul Deleon, JANELLE Slaughter, OZZY Miner, JANELLE  Progress Note    3/26/2023    SUBJECTIVE:   3/22:  Complains of feet pain. Denies abdominal discomfort. Denies chest pain or SOB. Appetite is poor. Taking in liquids sluggishly. Family at bedside. 3/23:  Weak and fatigued. Complains of abdominal discomfort today. Appetite remains poor. 3/24: Seen while laying in bed. Still having diarrhea. She reports up to 4x per day. Appetite is poor. Denies chest pain or dyspnea. She is on room air. Blood pressures remain low.     3/25: \"I felt better. \"  Lightheaded. No chest pain or palpitations. Ongoing fatigue, generalized weakness. Ongoing anorexia. Denies nausea or vomiting. No peripheral swelling. Ongoing diarrhea. No melena or hematochezia    3/26: Continued severe fatigue, malaise, generalized weakness and anorexia. Persistent nausea. Refusing oral medications including liquid oral medications due to inability to swallow and nausea, though tolerating [employer] [facility] and other liquids that her family has brought her without difficulty. Continued lightheadedness. Remains hypotensive, mildly so. Has some stomach \"upset,\" though without pain. No peripheral swelling. PROBLEM LIST:    Principal Problem:    DEEDEE (acute kidney injury) ([facility] [address]  Active Problems: Thromboembolism of iliac artery (HCC)    Severe protein-calorie malnutrition (HCC)  Resolved Problems:    * No resolved hospital problems. *         DIET:    Diet NPO     MEDS (scheduled):     HYDROmorphone  0.25 mg IntraVENous Once    potassium chloride  40 mEq Oral TID    lidocaine  5 mL IntraDERmal Once    sodium chloride flush  5-40 mL IntraVENous 2 times per day    heparin flush  1 mL IntraVENous 2 times per day    folic acid  1 mg Oral Daily    calcium carbonate  2 tablet Oral BID    OLANZapine  5 mg Oral Nightly    pantoprazole  40 mg
[facility] MD Michelle Rg, MD Magalis Carey, MD Ifeoma Gardiner, CNP   Kira Slaughter, OZZY  Progress Note    3/22/2023    SUBJECTIVE:   3/22:  Complains of feet pain. Denies abdominal discomfort. Denies chest pain or SOB. Appetite is poor. Taking in liquids sluggishly. Family at bedside. PROBLEM LIST:    Principal Problem:    DEEDEE (acute kidney injury) ([address]  Active Problems: Thromboembolism of iliac artery (HCC)    Severe protein-calorie malnutrition (HCC)  Resolved Problems:    * No resolved hospital problems. *         DIET:    ADULT DIET;  Dysphagia - Soft and Bite Sized  ADULT ORAL NUTRITION SUPPLEMENT; Breakfast, Lunch, Dinner; Standard High Calorie/High Protein Oral Supplement     MEDS (scheduled):    iron sucrose  100 mg IntraVENous Daily    potassium chloride  40 mEq Oral BID    lidocaine  5 mL IntraDERmal Once    sodium chloride flush  5-40 mL IntraVENous 2 times per day    heparin flush  1 mL IntraVENous 2 times per day    folic acid  1 mg Oral Daily    calcium carbonate  2 tablet Oral BID    OLANZapine  5 mg Oral Nightly    pantoprazole  40 mg Oral BID    sucralfate  1 g Oral 4x Daily    Vitamin D  1,000 Units Oral Daily    sodium chloride flush  5-40 mL IntraVENous 2 times per day    heparin flush  300 Units IntraCATHeter BID    [Held by provider] enoxaparin  1 mg/kg SubCUTAneous BID       MEDS (infusions):   IV infusion builder 150 mL/hr at 03/22/23 1302    sodium chloride         MEDS (prn):  traMADol, sodium chloride flush, sodium chloride, heparin flush, diphenoxylate-atropine, sodium chloride flush, ondansetron **OR** ondansetron, acetaminophen **OR** acetaminophen    PHYSICAL EXAM:     Patient Vitals for the past 24 hrs:   BP Temp Temp src Pulse Resp SpO2   03/22/23 0731 90/71 97.8 °F (36.6 °C) Temporal (!) 116 16 92 %   03/22/23 0649 -- -- -- -- 16 --   03/21/23 2033 99/80 98.1 °F (36.7 °C) Temporal (!) 112 16 100 %
[facility] Roberto A. Cosette Marine, MD Marva Sellers, MD Cristino Plowman, MD [facility], MD Medford Locust, JANELLE Slaughter, OZZY  Progress Note    3/23/2023    SUBJECTIVE:   3/22:  Complains of feet pain. Denies abdominal discomfort. Denies chest pain or SOB. Appetite is poor. Taking in liquids sluggishly. Family at bedside. 3/23:  Weak and fatigued. Complains of abdominal discomfort today. Appetite remains poor. PROBLEM LIST:    Principal Problem:    DEEDEE (acute kidney injury) ([address]  Active Problems: Thromboembolism of iliac artery (HCC)    Severe protein-calorie malnutrition (HCC)  Resolved Problems:    * No resolved hospital problems. *         DIET:    ADULT DIET;  Dysphagia - Soft and Bite Sized  ADULT ORAL NUTRITION SUPPLEMENT; Breakfast, Lunch, Dinner; Standard High Calorie/High Protein Oral Supplement     MEDS (scheduled):    iron sucrose  100 mg IntraVENous Daily    potassium chloride  40 mEq Oral TID    lidocaine  5 mL IntraDERmal Once    sodium chloride flush  5-40 mL IntraVENous 2 times per day    heparin flush  1 mL IntraVENous 2 times per day    folic acid  1 mg Oral Daily    calcium carbonate  2 tablet Oral BID    OLANZapine  5 mg Oral Nightly    pantoprazole  40 mg Oral BID    sucralfate  1 g Oral 4x Daily    Vitamin D  1,000 Units Oral Daily    sodium chloride flush  5-40 mL IntraVENous 2 times per day    heparin flush  300 Units IntraCATHeter BID    [Held by provider] enoxaparin  1 mg/kg SubCUTAneous BID       MEDS (infusions):   IV infusion builder 150 mL/hr at 03/23/23 0519    sodium chloride         MEDS (prn):  traMADol, sodium chloride flush, sodium chloride, heparin flush, diphenoxylate-atropine, sodium chloride flush, ondansetron **OR** ondansetron, acetaminophen **OR** acetaminophen    PHYSICAL EXAM:     Patient Vitals for the past 24 hrs:   BP Temp Temp src Pulse Resp SpO2   03/23/23 1124 104/68 97.7 °F (36.5 °C) Temporal (!) 106 16 94 %
Attempted to call nurse to nurse to [place] they stated they will call me back.
Attending NP notified via telephone regarding K+ of 2.9
Attending NP notified via telephone regarding pt output. No new orders at this time.
Called Dr. Ros Roper and updated her that pt's BP is still in the low 90's
Called and notified Nelson Zurita that pt is being picked up now and will be in route shortly pt stable and report given to paramedic for transport.
Called nurse to nurse to [address] [place] [address] all questions answered.
Comprehensive Nutrition Assessment    Type and Reason for Visit:  Initial, Positive Nutrition Screen    Nutrition Recommendations/Plan:   Continue current diet w/ ONS to optimize intake. Will continue to monitor. Malnutrition Assessment:  Malnutrition Status:  Severe malnutrition (03/20/23 1439)    Context:  Chronic Illness     Findings of the 6 clinical characteristics of malnutrition:  Energy Intake:  75% or less estimated energy requirements for 1 month or longer  Weight Loss:  Greater than 7.5% over 3 months (20% Weight loss  x 2 months, 26.8% weight loss x ~5 months)     Body Fat Loss:   (Moderate) Orbital, Triceps   Muscle Mass Loss:   (Moderate temporal severe clavicle) Temples (temporalis), Clavicles (pectoralis & deltoids)  Fluid Accumulation:  No significant fluid accumulation     Strength:  Not Performed    Nutrition Assessment:    Pt. remains at risk d/t severe malnutrtion. Admitted w/ FTT and DEEDEE. Noted Metabolic acidosis 2/2 multifactorial. Vascular surgery was consulted for left iliac artery occlusive disease, likely subacute, in the setting of stage IV metastatic carcinoma. 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 [facility]-awaiting bed availability. PO intake is sporadic and pt. is w/ poor appetite. Will continue ONS and monitor. Nutrition Related Findings:    A&OX2, hypokalemia, elevated BUN/Cr, + I/O, +BS, loss of appetite, +diarrhea, no edema, Pt. reports poor appetite and eating \"very little\" pta and current. Amendable to ONS Wound Type: None       Current Nutrition Intake & Therapies:    Average Meal Intake: %, 1-25% (0-25% x2, % x3)  Average Supplements Intake: Unable to assess  ADULT DIET;  Dysphagia - Soft and Bite Sized  ADULT ORAL NUTRITION SUPPLEMENT; Breakfast, Lunch, Dinner; Standard High Calorie/High Protein Oral Supplement    Anthropometric Measures:  Height: 5' 8\" (172.7
Critical lab of Lactic acid- 4.9. April, NP informed via telephone. Instructions given to inform nephrology.    Message left for Mega Sánchez NP.
Critical lactic acid of 5.2. April, NP informed via telephone. Edel Perez MD (nephrology) informed via [employer].
Dr. Marcy Johnson aware of vascular consult.
Dr. Tennille Buchanan notified of critical Co2 lab via [employer] phone messaging and also regarding patients IV status and fluids.
Dr. Underwood Dec notified of nephrology consult. Pt added to census.
[facility]                                Progress note    Subjective:    Resting in bed, no family at bedside   No acute events or issues overnight  Appears significantly improved, awake and tolerating some food  Still waiting for bed      Objective:    /68   Pulse (!) 106   Temp 97.7 °F (36.5 °C) (Temporal)   Resp 16   Ht 5' 8\" (1.727 m)   Wt 122 lb 3.2 oz (55.4 kg)   LMP 01/10/2013   SpO2 94%   BMI 18.58 kg/m²     In: 600 [P.O.:600]  Out: 250   In: 600   Out: 250 [Urine:250]    General appearance: sleeping, ill-appearing   HEENT: AT/NC, MMM  Neck: FROM, supple  Lungs: Clear to auscultation  CV: RRR, no MRGs  Vasc: Radial pulses 2+  Abdomen: Soft, non-tender; no masses or HSM  Extremities: No peripheral edema or digital cyanosis  Skin: no rash, lesions or ulcers  Psych: Alert and oriented to person, place and time  Neuro: Alert and interactive     Recent Labs     03/22/23  0549 03/23/23  0349   WBC 6.8 8.7   HGB 7.3* 7.3*   HCT 21.7* 21.0*    265         Recent Labs     03/22/23  0549 03/22/23  1643 03/23/23  0349   * 144 144   K 2.9* 2.9* 3.4*   * 108* 107   CO2 17* 19* 22   BUN 24* 23 24*   CREATININE 1.6* 1.6* 1.6*   CALCIUM 8.2* 7.9* 7.9*         Assessment:    Principal Problem:    DEEDEE (acute kidney injury) ([address]  Active Problems: Thromboembolism of iliac artery (HCC)    Severe protein-calorie malnutrition (HCC)  Resolved Problems:    * No resolved hospital problems.  *      Plan:  Patient is a 82-year-old female with an extensive history of neuroendocrine tumor with sensitive osteoblastic metastatic disease and liver mets admitted to med/surg for      DEEDEE  -Monitor labs  -BUN/Cr 22/1.4> 23/1.6  -Potassium 3.2-supplement  -IVF NS @ 75  -Monitor strict I&O's  -Lactic acid 3.6> patient received 2 L NS bolus in the ED  -Avoid nephrotoxins     Stage IV Metastatic Liver Cancer   -Patient follows at [facility] [place]  -Patient had a biopsy completed of the liver mass
Informed of critical lactic acid of 5.5. NP notified via telephone. Awaiting orders.
Lactic Acid obtained, via PICC line, without incident.
Left message via answering service to Lisa Ramesh for admission/diet orders for patient.
[facility]                                Progress note    Subjective:  Awake no acute complaints  Indicates she slept well and feels better today  Case discussed with daughter at home, see below    Objective:    /72   Pulse (!) 108   Temp 97.8 °F (36.6 °C) (Temporal)   Resp 16   Ht 5' 8\" (1.727 m)   Wt 122 lb 3.2 oz (55.4 kg)   LMP 01/10/2013   SpO2 95%   BMI 18.58 kg/m²     In: 460 [P.O.:450; I.V.:10]  Out: 0   In: 460   Out: 0     General appearance: sleeping, ill-appearing   HEENT: AT/NC, MMM  Neck: FROM, supple  Lungs: Clear to auscultation  CV: RRR, no MRGs  Vasc: Radial pulses 2+  Abdomen: Soft, non-tender; no masses or HSM  Extremities: No peripheral edema or digital cyanosis  Skin: no rash, lesions or ulcers  Psych: Alert and oriented to person, place and time  Neuro: Alert and interactive     Recent Labs     03/20/23  1155   WBC 7.0   HGB 7.7*   HCT 24.5*          Recent Labs     03/19/23  0600 03/20/23  1155 03/21/23  0521    145 143   K 3.2*  3.2* 3.1* 3.0*   * 120* 119*   CO2 10* 7* 7*   BUN 25* 25* 25*   CREATININE 1.7* 1.7* 1.7*   CALCIUM 8.4* 8.5* 8.9       Assessment:    Principal Problem:    DEEDEE (acute kidney injury) ([facility] [address]  Active Problems: Thromboembolism of iliac artery (HCC)    Severe protein-calorie malnutrition (HCC)  Resolved Problems:    * No resolved hospital problems. *      Plan:  Patient is a 71-year-old female with an extensive history of neuroendocrine tumor with sensitive osteoblastic metastatic disease and liver mets admitted to med/surg for      DEEDEE  -Monitor labs  -BUN/Cr 22/1.4> 23/1.6  -Potassium 3.2-supplement  -IVF NS @ 75  -Monitor strict I&O's  -Lactic acid 3.6> patient received 2 L NS bolus in the ED  -Avoid nephrotoxins     Stage IV Metastatic Liver Cancer   -Patient follows at [facility] [place]  -Patient had a biopsy completed of the liver mass positive for well-differentiated NET on 12/30/2022.   Patient was started on octreotide
Notified Dr Julia Chavez that pt is refusing to take the oral potassium.  IV potassium still infusing
Notified Dr. Lashonda Rjoas of pt's potassium level and glucose level
Notified NP of lactic level
Notified of critical lab of CO2  7.
Nursing staff notified vascular surgery that consult was cancelled    Please call if needed    Daniel Narvaez MD
PICC line accidentally pulled out by patient this morning. IV team notified.
PT LEFT PEDAL PULSE CONFIRMED WITH DOPPLER. PT OFFERED FOOD AND DRINKS, REFUSED AT THIS TIME. GLUCOSE CHECKED EARLIER DUE TO DX OF PRE DIABETIC AND POOR ORAL INTAKE . NO IV ACCESS  IV TEAM HAS BEEN MESSAGE FOR MORNING  IV ACCESS.
PT STATED SHE FEELS LIKE SHE NEED TO GO HOME. GINERALE, CRACKERS, ZOFRAN AND TYLENOL GIVEN. PT WILL CALL IF SHE NEEDS ANYTHING ELSE.
Palliative consult called
Patient has newly placed PICC line. Unable to draw labs. Line was flushed with heprin. Area surounding PICC insertion is pinkish/reddish. IV team made aware via [address] [place] [address].
Patient has not urinated for this shift. Bladder scan= 0mL. NP made aware via telephone .
Patient refused lab draw x2.
Patient's daughter , Irma Izquierdo, called and concerned about patients plan of care. Informed Irma Izquierdo that she would be able to talk with social work during day shift. Daughter stated she would be in early.
Perfect served re midline that was not drawing back blood. Removed bloody dressing, cleaned and redressed with new chg dressing. Flushed with approx 20cc of saline flush. Blood return returned.
Physical Therapy    Date: 3/20/2023       Patient Name: Luthre Valles  : 1960      MRN: [id number]    Physical therapy order received and chart reviewed. PT evaluation attempted this PM. Pt declined participation at this time d/t fatigue and nausea. Will follow up as appropriate.      Chrissy Garcia PT, DPT, [id number]
Physical Therapy    Date: 3/21/2023       Patient Name: Bryon Borden  : 1960      MRN: [id number]    Physical therapy order received and chart reviewed. PT evaluation held at this time per RN d/t increase in pt's pain level. Will follow up as appropriate.      Alexander Foster PT, DPT, [id number]
Physical Therapy    Date: 3/22/2023       Patient Name: John Goldstein  : 1960      MRN: [id number]    Physical therapy order received and chart reviewed. PT evaluation attempted. Pt declined at this time. Will follow up as appropriate.      Zeny Cherry PT, DPT, [id number]
Please pass on to have doctors call pt daughter Alejandro Melendrez for update on plan of care.  [phone]
Power midline  Placement 3/20/2023    Product number: [id number]   Lot Number: [id number]      Ultrasound: yes   Left Brachial vein:                Upper Arm Circumference: (CM) 26cm    Size:(FR)/GUAGE 4.5frsl    Exposed Length: (CM) 3cm    Internal Length: (CM) 12cm   Cut: (CM) 0cm   Vein Measurement: 0.58cm    Kristie Calvillo RN  3/20/2023  4:23 PM
Pt has bed  at [address] [place] [address] bed 26 [address]to[address] [phone] Access center to arrange transport
Pt removed kearney leg strap re applied and educated pt that it is important to reduce the risk of the catheter being pulled or dislodged. Pt verbalized understanding.
Pt stated that even though she swallowed the  water for her bedside swallow, she \"feels like its hard to go down and that if feels like something is stuck\" notified Dr. Gallardo Repress
Received call from Access center. Currently, no available beds.
[facility] aware of vascular consult.
Salvador Vazquez NP notified of hemoglobin level.
Sodium Bicarbonate infusion discontinued per Dr Barbara Irwin order.
Spoke to Dr. Wilson Ferrer about pts BP and K level as well as explained she is not taking any food or pills orally because she feels she cannot swallow. Bolus ordered and he will evaluate her fluids.
Spoke with patient's daughter Lori Saenz gave her physicians number to call for payment for ambulance trip, also gave her nurses number to call when payment is taken care of, then will get transport time
Spoke with pt daughter Leonarda Gotti, gave her the estimated time for  to transfer pt.  To [facility]
Subjective:  Chart reviewed,  H/H with continued decline  Patient to be transferred to [facility], primary oncologist there  Continued pain in feet, fatigued but wakes  Nephrology following    Objective:    BP 90/71   Pulse (!) 116   Temp 97.8 °F (36.6 °C) (Temporal)   Resp 16   Ht 5' 8\" (1.727 m)   Wt 122 lb 3.2 oz (55.4 kg)   LMP 01/10/2013   SpO2 92%   BMI 18.58 kg/m²     General: NAD, sleeping  HEENT: sclera anicteric, EOMI, mucosa moist  NECK: supple, trachea midline  Heart:  tachycardic  Lungs:  CTA bilaterally, no wheeze, rales or rhonchi  Abd: BS present, nontender,distended  Skin: Intact, no petechia or purpura, warm to touch    CBC with Differential:    Lab Results   Component Value Date/Time    WBC 6.8 03/22/2023 05:49 AM    RBC 2.91 03/22/2023 05:49 AM    HGB 7.3 03/22/2023 05:49 AM    HCT 21.7 03/22/2023 05:49 AM     03/22/2023 05:49 AM    MCV 74.6 03/22/2023 05:49 AM    MCH 25.1 03/22/2023 05:49 AM    MCHC 33.6 03/22/2023 05:49 AM    RDW 18.6 03/22/2023 05:49 AM    SEGSPCT 55 01/17/2013 10:52 AM    BANDSPCT 1 07/23/2015 10:10 PM    LYMPHOPCT 17.0 03/18/2023 12:30 PM    MONOPCT 7.5 03/18/2023 12:30 PM    BASOPCT 0.6 03/18/2023 12:30 PM    MONOSABS 0.58 03/18/2023 12:30 PM    LYMPHSABS 1.32 03/18/2023 12:30 PM    EOSABS 0.03 03/18/2023 12:30 PM    BASOSABS 0.05 03/18/2023 12:30 PM     CMP:    Lab Results   Component Value Date/Time     03/22/2023 05:49 AM    K 2.9 03/22/2023 05:49 AM    K 3.2 03/19/2023 06:00 AM     03/22/2023 05:49 AM    CO2 17 03/22/2023 05:49 AM    BUN 24 03/22/2023 05:49 AM    CREATININE 1.6 03/22/2023 05:49 AM    GFRAA >60 12/18/2021 04:46 PM    LABGLOM 36 03/22/2023 05:49 AM    GLUCOSE 95 03/22/2023 05:49 AM    GLUCOSE 92 01/18/2012 12:30 PM    PROT 6.0 03/19/2023 06:00 AM    LABALBU 2.4 03/19/2023 06:00 AM    LABALBU 4.5 01/18/2012 12:30 PM    CALCIUM 8.2 03/22/2023 05:49 AM    BILITOT 0.5 03/19/2023 06:00 AM    ALKPHOS 560 03/19/2023 06:00 AM    AST 54
Subjective:  Chart reviewed,  nephrology consulted  CBC no reported from this am  Sleeping, family updated at bedside    Objective:    /72   Pulse (!) 108   Temp 97.8 °F (36.6 °C) (Temporal)   Resp 16   Ht 5' 8\" (1.727 m)   Wt 122 lb 3.2 oz (55.4 kg)   LMP 01/10/2013   SpO2 95%   BMI 18.58 kg/m²     General: NAD, sleeping  HEENT: sclera anicteric, EOMI, mucosa moist  NECK: supple, trachea midline  Heart:  RRR  Lungs:  CTA bilaterally, no wheeze, rales or rhonchi  Abd: BS present, nontender,distended  Skin: Intact, no petechia or purpura, warm to touch    CBC with Differential:    Lab Results   Component Value Date/Time    WBC 7.0 03/20/2023 11:55 AM    RBC 3.07 03/20/2023 11:55 AM    HGB 7.7 03/20/2023 11:55 AM    HCT 24.5 03/20/2023 11:55 AM     03/20/2023 11:55 AM    MCV 79.8 03/20/2023 11:55 AM    MCH 25.1 03/20/2023 11:55 AM    MCHC 31.4 03/20/2023 11:55 AM    RDW 19.8 03/20/2023 11:55 AM    SEGSPCT 55 01/17/2013 10:52 AM    BANDSPCT 1 07/23/2015 10:10 PM    LYMPHOPCT 17.0 03/18/2023 12:30 PM    MONOPCT 7.5 03/18/2023 12:30 PM    BASOPCT 0.6 03/18/2023 12:30 PM    MONOSABS 0.58 03/18/2023 12:30 PM    LYMPHSABS 1.32 03/18/2023 12:30 PM    EOSABS 0.03 03/18/2023 12:30 PM    BASOSABS 0.05 03/18/2023 12:30 PM     CMP:    Lab Results   Component Value Date/Time     03/21/2023 05:21 AM    K 3.0 03/21/2023 05:21 AM    K 3.2 03/19/2023 06:00 AM     03/21/2023 05:21 AM    CO2 7 03/21/2023 05:21 AM    BUN 25 03/21/2023 05:21 AM    CREATININE 1.7 03/21/2023 05:21 AM    GFRAA >60 12/18/2021 04:46 PM    LABGLOM 34 03/21/2023 05:21 AM    GLUCOSE 79 03/21/2023 05:21 AM    GLUCOSE 92 01/18/2012 12:30 PM    PROT 6.0 03/19/2023 06:00 AM    LABALBU 2.4 03/19/2023 06:00 AM    LABALBU 4.5 01/18/2012 12:30 PM    CALCIUM 8.9 03/21/2023 05:21 AM    BILITOT 0.5 03/19/2023 06:00 AM    ALKPHOS 560 03/19/2023 06:00 AM    AST 54 03/19/2023 06:00 AM    ALT 23 03/19/2023 06:00 AM          Current
Vascular consult called.
06:00 AM    ALKPHOS 560 03/19/2023 06:00 AM    AST 54 03/19/2023 06:00 AM    ALT 23 03/19/2023 06:00 AM          Current Facility-Administered Medications:     lidocaine 1 % injection 5 mL, 5 mL, IntraDERmal, Once, MAXIMUS Lake CNP    sodium chloride flush 0.9 % injection 5-40 mL, 5-40 mL, IntraVENous, 2 times per day, MAXIMUS Lake - CNP    sodium chloride flush 0.9 % injection 5-40 mL, 5-40 mL, IntraVENous, PRN, AMXIMUS Lake CNP    0.9 % sodium chloride infusion, , IntraVENous, PRN, MAXIMUS Lake CNP    heparin flush 100 UNIT/ML injection 100 Units, 1 mL, IntraVENous, 2 times per day, MAXIMUS Lake - CNP    heparin flush 100 UNIT/ML injection 100 Units, 1 mL, IntraCATHeter, PRN, MAXIMUS Lake CNP    calcium carbonate (TUMS) chewable tablet 1,000 mg, 2 tablet, Oral, BID, MAXIMUS Trammell - CNP, 1,000 mg at 03/20/23 1011    diphenoxylate-atropine (LOMOTIL) 2.5-0.025 MG per tablet 2 tablet, 2 tablet, Oral, 4x Daily PRN, MAXIMUS Trammell CNP    OLANZapine (ZYPREXA) tablet 5 mg, 5 mg, Oral, Nightly, MAXIMUS Trammell - CNP, 5 mg at 03/19/23 2114    pantoprazole (PROTONIX) tablet 40 mg, 40 mg, Oral, BID, MAXIMUS Trammell - CNP, 40 mg at 03/20/23 1011    potassium chloride (KLOR-CON M) extended release tablet 20 mEq, 20 mEq, Oral, Daily, MAXIMUS Trammell - CNP, 20 mEq at 03/20/23 1011    sucralfate (CARAFATE) tablet 1 g, 1 g, Oral, 4x Daily, MAXIMUS Trammell - CNP, 1 g at 03/20/23 1348    Vitamin D (CHOLECALCIFEROL) tablet 1,000 Units, 1,000 Units, Oral, Daily, TawnyaMAXIMUS Guerrero CNP, 1,000 Units at 03/20/23 1011    0.9 % sodium chloride infusion, , IntraVENous, Continuous, MAXIMUS Trammell CNP, Last Rate: 75 mL/hr at 03/19/23 0549, New Bag at 03/19/23 0549    sodium chloride flush 0.9 % injection 5-40 mL, 5-40 mL, IntraVENous, 2 times per day, MAXIMUS Trammell CNP, 10 mL at 03/20/23 1013    sodium chloride flush 0.9 % injection
3/27/2023 5:54 PM
LYMPHSABS 1.32 03/18/2023 12:30 PM    EOSABS 0.03 03/18/2023 12:30 PM    BASOSABS 0.05 03/18/2023 12:30 PM     CMP:    Lab Results   Component Value Date/Time     03/19/2023 06:00 AM    K 3.2 03/19/2023 06:00 AM    K 3.1 03/18/2023 05:06 PM     03/19/2023 06:00 AM    CO2 10 03/19/2023 06:00 AM    BUN 25 03/19/2023 06:00 AM    CREATININE 1.7 03/19/2023 06:00 AM    GFRAA >60 12/18/2021 04:46 PM    LABGLOM 34 03/19/2023 06:00 AM    GLUCOSE 96 03/19/2023 06:00 AM    GLUCOSE 92 01/18/2012 12:30 PM    PROT 6.0 03/19/2023 06:00 AM    LABALBU 2.4 03/19/2023 06:00 AM    LABALBU 4.5 01/18/2012 12:30 PM    CALCIUM 8.4 03/19/2023 06:00 AM    BILITOT 0.5 03/19/2023 06:00 AM    ALKPHOS 560 03/19/2023 06:00 AM    AST 54 03/19/2023 06:00 AM    ALT 23 03/19/2023 06:00 AM          Current Facility-Administered Medications:     calcium carbonate (TUMS) chewable tablet 1,000 mg, 2 tablet, Oral, BID, Tamsen Andkatie, APRN - CNP    diphenoxylate-atropine (LOMOTIL) 2.5-0.025 MG per tablet 2 tablet, 2 tablet, Oral, 4x Daily PRN, Tamsender Andkatie, APRN - CNP    megestrol (MEGACE) 40 MG/ML suspension 200 mg, 200 mg, Oral, Daily, Tamsen Andkatie, APRN - CNP    OLANZapine (ZYPREXA) tablet 5 mg, 5 mg, Oral, Nightly, Tamsen Andes, APRN - CNP    pantoprazole (PROTONIX) tablet 40 mg, 40 mg, Oral, BID, Tamsen Andes, APRN - CNP    potassium chloride (KLOR-CON M) extended release tablet 20 mEq, 20 mEq, Oral, Daily, Tamsen Andes, APRN - CNP    sucralfate (CARAFATE) tablet 1 g, 1 g, Oral, 4x Daily, MAXIMUS Davila CNP    Vitamin D (CHOLECALCIFEROL) tablet 1,000 Units, 1,000 Units, Oral, Daily, MAXIMUS Davila - CNP    0.9 % sodium chloride infusion, , IntraVENous, Continuous, MAXIMUS Davila CNP, Last Rate: 75 mL/hr at 03/19/23 0549, New Bag at 03/19/23 0549    sodium chloride flush 0.9 % injection 5-40 mL, 5-40 mL, IntraVENous, 2 times per day, MAXIMUS Davila - CNP    sodium chloride flush 0.9 % injection 5-40 mL, 5-40 mL,
clubbing, cyanosis,   NO  edema  Skin: no rashes or lesions  Psych: A & O x3  Neuro: grossly intact, moves all four extremities. Labs:   Recent Labs     03/24/23  0900 03/24/23  2206 03/25/23  0305 03/25/23  1047   WBC 10.9 10.5  --  10.8   HGB 6.9* 6.6* 8.2* 8.9*   HCT 20.5* 18.6* 24.1* 25.7*    243  --  260     Recent Labs     03/23/23  0349 03/24/23  0900 03/25/23  0900    145 136   K 3.4* 3.5 3.0*    103 98   CO2 22 22 26   BUN 24* 27* 28*   CREATININE 1.6* 1.7* 1.6*   CALCIUM 7.9* 8.4* 8.0*     Recent Labs     03/22/23  1643 03/24/23  0900   AST 73* 73*   ALT 23 24   BILIDIR <0.2 0.2   BILITOT 0.3 0.5   ALKPHOS 588* 602*     Recent Labs     03/24/23  0900   INR 2.1     No results for input(s): CKTOTAL, TROPONINI in the last 72 hours.   Recent Labs     03/22/23  1643 03/24/23  0900   AST 73* 73*   ALT 23 24   BILIDIR <0.2 0.2   BILITOT 0.3 0.5   ALKPHOS 588* 602*     Recent Labs     03/23/23  1635 03/24/23  0900 03/25/23  1047   LACTA 6.6* 6.0* 4.4*     No results found for: Kojo Beth  Lab Results   Component Value Date    AMMONIA 30.0 03/22/2023       Assessment:    Active Hospital Problems    Diagnosis Date Noted    Thromboembolism of iliac artery ([address] [I74.5] 03/20/2023     Priority: Medium    Severe protein-calorie malnutrition ([address] [E43] 03/20/2023     Priority: Medium    DEEDEE (acute kidney injury) ([facility] [N17.9] 03/18/2023     Priority: Medium   NEUROENDOCRINE TUMOR   LEFT ILIAC ARTERY THROMBUS  HYPOKALEMIA    Plan  TO [facility]   REPLACE K      DVT Prophylaxis: LOVENOX  Diet: Diet NPO  Code Status: Full Code    PT/OT Eval Status:  ORDERED    Dispo - [facility]      Electronically signed by Yris Garsia DO on 3/25/2023 at 2:43 PM [facility]
monthly  -PET scan done on 3/3/2023 which noted stable disease in liver and bone metastasis     Left common iliac artery thrombus in patient with underlying malignancy, likely thromboembolic disease from elsewhere  -Start weigh-based lovenox for now  -Consult Vascular surgery for input  -Doubt she would be a candidate for intervention given her current comorbidities with diffuse metastatic disease     Palliative medicine   -Will need goals of care and code status discussion     3/20/23:  -No surgically intervention from vascular, no need for [address] on discharge   -Loss IV access, awaiting placement of line to continue fluids   -Recheck labs tomorrow  -Vitals stable   -Initiate 75 mEq bicarb drip with D5/0.45 normal saline x1 L  -Recheck a.m. labs  -Family wants to continue full code-diffuse metastatic disease  -No further plans by vascular service    3/82/1525  Metabolic acidosis [id number] mEq sodium bicarb with D5.45 normal saline was initiated last night however persistently acidotic without any change in BMP this morning, along with persistent DEEDEE with only mild improvement  As such, renal consult-input appreciated  Continue to supplement potassium  Retroperitoneal ultrasound negative  Case discussed with patient's Taran Degree over the phone-she indicates her oncologist would like her to be transferred to [facility] for ongoing care-we will initiate transfer through transfer center tomorrow  Patient had indicated that she prefers to have local care however daughter wishes for transfer and indicates that she has discussed the case with the oncologist at [facility] [place] and he would like her transferred    3/22/23:  -Sodium bicarb 150 meq in D5 w/ 20K @ 150 remains, nephrology managing  -Slow improvement in kidney function, 24/1.6  -CO2 on BMP improving, 17 today  -Improving lactic acidosis > 5.5 > 4.9    Transfer to [facility] was initiated through access center as daughter states patient's oncologist wants her transferred there.
octreotide LAR 30 mg monthly  -PET scan done on 3/3/2023 which noted stable disease in liver and bone metastasis     Left common iliac artery thrombus in patient with underlying malignancy, likely thromboembolic disease from elsewhere  -Start weigh-based lovenox for now  -Consult Vascular surgery for input  -Doubt she would be a candidate for intervention given her current comorbidities with diffuse metastatic disease     Palliative medicine   -Will need goals of care and code status discussion     3/20/23:  -No surgically intervention from vascular, no need for [facility] on discharge   -Loss IV access, awaiting placement of line to continue fluids   -Recheck labs tomorrow  -Vitals stable   -Initiate 75 mEq bicarb drip with D5/0.45 normal saline x1 L  -Recheck a.m. labs  -Family wants to continue full code-diffuse metastatic disease  -No further plans by vascular service    Code status: Full  Consultants: Vascular surgery and hem/onc      DVT Prophylaxis Lovenox   PT/OT  Discharge planning     MAXIMUS Pathak CNP  3:09 PM  3/20/2023     Above note edited to reflect my thoughts     I personally saw, examined and provided care for the patient. Radiographs, labs and medication list were reviewed by me independently. The case was discussed in detail and plans for care were established. Review of JUNE Pathak   , documentation was conducted and revisions were made as appropriate directly by me. I agree with the above documented exam, problem list, and plan of care.      Andreia Silver MD  5:40 PM  3/20/2023
oz (55.4 kg) (3/19 BS first measured)  Current Body Weight: 122 lb 3.2 oz (55.4 kg) (3/19 BS), 87.3 % IBW. Weight Source: Bed Scale  Current BMI (kg/m2): 18.6  Usual Body Weight: 154 lb (69.9 kg) (1/19/23 BS; (x 2months))  % Weight Change (Calculated): -20.6  Weight Adjustment For: No Adjustment                 BMI Categories: Normal Weight (BMI 18.5-24. 9)    Estimated Daily Nutrient Needs:  Energy Requirements Based On: Formula  Weight Used for Energy Requirements: Current  Energy (kcal/day): 1500-1700kcal (MSJ x1.3-1. 4SF)  Weight Used for Protein Requirements: Current  Protein (g/day): 78-89g (1.4-1.6g/kgCBW)  Method Used for Fluid Requirements: 1 ml/kcal  Fluid (ml/day):     Nutrition Diagnosis:   Severe malnutrition, In context of chronic illness related to catabolic illness (metastatic neuroendocrine carcinoma) as evidenced by poor intake prior to admission, Criteria as identified in malnutrition assessment    Nutrition Interventions:   Food and/or Nutrient Delivery: Continue Current Diet, Start Oral Nutrition Supplement (Ensure + TID)  Nutrition Education/Counseling: No recommendation at this time  Coordination of Nutrition Care: Continue to monitor while inpatient       Goals:     Goals: PO intake 50% or greater, by next RD assessment       Nutrition Monitoring and Evaluation:   Behavioral-Environmental Outcomes: None Identified  Food/Nutrient Intake Outcomes: Food and Nutrient Intake, Supplement Intake  Physical Signs/Symptoms Outcomes: Biochemical Data, GI Status, Fluid Status or Edema, Nutrition Focused Physical Findings, Skin, Weight    Discharge Planning:    Continue Oral Nutrition Supplement     Ritika León RD  Contact: ext [phone]
sounds present, soft, nontender, nondistended, no masses  Extrem:  No clubbing, cyanosis,   NO  edema  Skin: no rashes or lesions  Psych: A & O x3  Neuro: grossly intact, moves all four extremities. Labs:   Recent Labs     03/25/23  1902 03/25/23  2100 03/26/23  1427   WBC 10.4 10.7 11.3   HGB 8.2* 8.4* 7.9*   HCT 23.9* 23.6* 22.9*    230 221     Recent Labs     03/25/23  0900 03/25/23  1529 03/26/23  0530    140 141   K 3.0* 3.1* 2.9*   CL 98 100 102   CO2 26 27 25   BUN 28* 28* 28*   CREATININE 1.6* 1.5* 1.5*   CALCIUM 8.0* 7.8* 8.0*   PHOS  --   --  4.6*     Recent Labs     03/24/23  0900   AST 73*   ALT 24   BILIDIR 0.2   BILITOT 0.5   ALKPHOS 602*     Recent Labs     03/24/23  0900   INR 2.1     No results for input(s): CKTOTAL, TROPONINI in the last 72 hours.   Recent Labs     03/24/23  0900   AST 73*   ALT 24   BILIDIR 0.2   BILITOT 0.5   ALKPHOS 602*     Recent Labs     03/23/23  1635 03/24/23  0900 03/25/23  1047   LACTA 6.6* 6.0* 4.4*     No results found for: Anngiancarlo [facility]  Lab Results   Component Value Date    AMMONIA 30.0 03/22/2023       Assessment:    Active Hospital Problems    Diagnosis Date Noted    Thromboembolism of iliac artery ([facility] [I74.5] 03/20/2023     Priority: Medium    Severe protein-calorie malnutrition ([facility] [E43] 03/20/2023     Priority: Medium    DEEDEE (acute kidney injury) ([facility] [address] [N17.9] 03/18/2023     Priority: Medium   NEUROENDOCRINE TUMOR   LEFT ILIAC ARTERY THROMBUS  HYPOKALEMIA     Plan  TO [facility]   REPLACE K        DVT Prophylaxis: LOVENOX  Diet: Diet NPO  Code Status: Full Code     PT/OT Eval Status:  ORDERED     Dispo - [facility]          Electronically signed by Perlita Anthony DO on 3/26/2023 at 3:11 PM [facility]
2 tablet, Oral, 4x Daily PRN, Sarah Rias, APRN - CNP    OLANZapine (ZYPREXA) tablet 5 mg, 5 mg, Oral, Nightly, Sarah Rias, APRN - CNP, 5 mg at 03/24/23 2201    pantoprazole (PROTONIX) tablet 40 mg, 40 mg, Oral, BID, Sarah Rias, APRN - CNP, 40 mg at 03/24/23 2113    sucralfate (CARAFATE) tablet 1 g, 1 g, Oral, 4x Daily, Sarah Rias, APRN - CNP, 1 g at 03/24/23 2113    Vitamin D (CHOLECALCIFEROL) tablet 1,000 Units, 1,000 Units, Oral, Daily, Sarah Shannons, APRN - CNP, 1,000 Units at 03/23/23 0744    sodium chloride flush 0.9 % injection 5-40 mL, 5-40 mL, IntraVENous, 2 times per day, Sarah Ortegas, APRN - CNP, 10 mL at 03/25/23 1122    sodium chloride flush 0.9 % injection 5-40 mL, 5-40 mL, IntraVENous, PRN, Sarah Ortegas, APRN - CNP    ondansetron (ZOFRAN-ODT) disintegrating tablet 4 mg, 4 mg, Oral, Q8H PRN, 4 mg at 03/20/23 1348 **OR** ondansetron (ZOFRAN) injection 4 mg, 4 mg, IntraVENous, Q6H PRN, Sarah Rias, APRN - CNP, 4 mg at 03/25/23 1239    acetaminophen (TYLENOL) tablet 650 mg, 650 mg, Oral, Q6H PRN, 650 mg at 03/20/23 1822 **OR** acetaminophen (TYLENOL) suppository 650 mg, 650 mg, Rectal, Q6H PRN, Sarah Rias, APRN - CNP    heparin flush 100 UNIT/ML injection 300 Units, 300 Units, IntraCATHeter, BID, Sarah Ortegas, APRN - CNP, 300 Units at 03/23/23 2019    enoxaparin (LOVENOX) injection 60 mg, 1 mg/kg, SubCUTAneous, BID, MAXIMUS Frederick - CNP, 60 mg at 03/25/23 1239    Assessment:    Principal Problem:    DEEDEE (acute kidney injury) ([address]  Active Problems: Thromboembolism of iliac artery (HCC)    Severe protein-calorie malnutrition (HCC)  Resolved Problems:    * No resolved hospital problems. *  59 yo female known to Dr. Waldemar Britton (last seen in Nov 2022) and Dr. Haider Cohen ([facility]). 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  She was found to have extensive osseous metastatic disease along with LLL pulmonary nodule and subcarinal LN
wants her transferred there. Patient is accepted to [facility] under Dr Calista Rivera and will await bed assignment. 3/23/23:  -Kidney function 24/1.6 staying about the same  -Fluctuating lactic acid 5.2  -IVF sodium bicarb 100 meq in D5 w/ 20 K+ @ 150  -Awaiting bed at [facility] [place]    3/24/23:  -Fluctuating lactic acid, 6.0 today  -H/H 6.9/20.5 > 1 uPRBC ordered   -Kidney function remains about the same  -Lomotil ordered for diarrhea  -[facility] at [facility]      Code status: Full  Consultants: Vascular surgery(s/o), Nephro and hem/onc      DVT Prophylaxis Lovenox   PT/OT  Discharge planning     MAXIMUS Bryant CNP  2:40 PM  3/24/2023     Above note edited to reflect my thoughts     I personally saw, examined and provided care for the patient. Radiographs, labs and medication list were reviewed by me independently. The case was discussed in detail and plans for care were established. Review of JUNE Bryant   , documentation was conducted and revisions were made as appropriate directly by me. I agree with the above documented exam, problem list, and plan of care.      Stephon Whelan MD  9:54 PM  3/24/2023
Extensive osseous metastatic disease. No evidence bowel obstruction. Additional findings as above. XR CHEST PORTABLE   Final Result   Catheter into SVC without pneumothorax. CT Head W/O Contrast   Final Result      No acute intracranial abnormality noted. XR CHEST PORTABLE   Final Result   Stable nonacute chest.      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 64 yo female known to Dr. 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 She was found to have extensive osseous metastatic disease along with LLL pulmonary nodule and subcarinal LN s/p right iliac wing bone biopsy 11/10/22 but cancer could not be classified so she had an additional biopsy completed of a liver mass positive for well-differentiated NET and she was started on octreotide LAR 30 mg monthly   12/30/22 by Dr. Jignesh Velazquez at [facility] who she now follows with. Assessment of disease completed 3/3/2023 by PET scan and stable disease noted in liver and bones metastasis. Chromogranin A remains elevated 60,600 on 2/3/2023  Xermelo for diarrhea. She is found to have partial left iliac artery thrombus. Patient presented to ED with fatigue, dizziness and fever. A/P  -left iliac arterial thrombus, subactue in appearance per vascular surgery and no OAC recommended  -resume lovenox, DVT prophylaxis with H/H low stable  -check hemolysis labs, not suggestive of hemolysis  -check ammonia, normal  -diarrhea associated with neuroendocrine cancer, continue xermelo (patient can bring from home) and lomotil PRN  -folate deficiency, start folic acid  -anemia, likely multifactorial and partially dilutional treat supportively and transfuse to maintain Hgb > 7    Thank you for allowing us to participate in the care of Eva Michelle.      Deny Gallo PA-C  [phone]    Electronically signed by SAL Kim on 3/23/2023 at 2:26 PM    Note: This report was completed using
Low Complexity PT evaluation 24038  [] Moderate Complexity PT evaluation 55136  [] High Complexity PT evaluation [id number]  [] PT Re-evaluation 95290  [] Gait training 19886 0 minutes  [] Manual therapy 75169 0 minutes  [x] Therapeutic activities 39721 10 minutes  [] Therapeutic exercises 28661 0 minutes  [] Neuromuscular reeducation 38821 0 minutes       Marga Roper PT, DPT  [id number]
will continue with POC with focus on increasing independence on ADLs. Pt very fatigued upon entering room required verbal cues for participation in task and transfer to bedside chair. Pt completed oral hygiene and face washing seated EOB with verbal cues for sequencing an problem solving task. Pt required assist to doff/chi socks seated EOB d/t poor functional reach and weakness/fatigue. Pt completed sit to stand with HHA to side step toward HOB. At end of session, pt lying supine in bed with all lines and tubes intact, call light within reach. Pt has made FAIR progress towards set goals.    Continue with current plan of care      Treatment Time In: 1200            Treatment Time Out: 1223               Treatment Charges: Mins Units   Ther Ex  66726     Manual Therapy [address] [facility]     Thera Activities 30110 8 1   ADL/Home t 62809 15 1   Neuro Re-ed 45514     Group Therapy      Orthotic manage/training  98824     Non-Billable Time     Total Timed Treatment 23 [address], [address], [address] [facility]
injection monthly   -Diarrhea associated with neuroendocrine cancer, can start Xermelo (patient can take from home) and lomotil PRN  -Folate acid supplement.     Electronically signed by Nohemy [email or web address] MD on 3/24/2023 at 5:04 PM